# Patient Record
Sex: MALE | Race: WHITE | NOT HISPANIC OR LATINO | Employment: FULL TIME | URBAN - METROPOLITAN AREA
[De-identification: names, ages, dates, MRNs, and addresses within clinical notes are randomized per-mention and may not be internally consistent; named-entity substitution may affect disease eponyms.]

---

## 2017-02-01 DIAGNOSIS — F33.0 MAJOR DEPRESSIVE DISORDER, RECURRENT EPISODE, MILD (H): Primary | ICD-10-CM

## 2017-02-01 NOTE — TELEPHONE ENCOUNTER
Routing refill request to provider for review/approval because:  Juju given x1 and patient did not follow up, please advise  Patient needs to be seen because it has been more than 1 year since last office visit.

## 2017-02-01 NOTE — TELEPHONE ENCOUNTER
buPROPion        Last Written Prescription Date: 12/21/16  Last Fill Quantity: 30; # refills: 0  Last Office Visit with [facility], [facility] or  Health prescribing provider:  12/21/16        Last PHQ-9 score on record=   PHQ-9 SCORE 10/6/2015   Total Score -   Total Score 1       AST       36   7/3/2014  ALT       20   6/29/2016

## 2017-02-02 RX ORDER — BUPROPION HYDROCHLORIDE 150 MG/1
150 TABLET ORAL EVERY MORNING
Qty: 30 TABLET | Refills: 0 | Status: SHIPPED | OUTPATIENT
Start: 2017-02-02 | End: 2017-03-03

## 2017-02-09 NOTE — TELEPHONE ENCOUNTER
Left detailed msg stating that a 30 day was sent to pharm, appt will be required for further refills.  Talya Price

## 2017-02-21 DIAGNOSIS — F33.0 MAJOR DEPRESSIVE DISORDER, RECURRENT EPISODE, MILD (H): ICD-10-CM

## 2017-02-21 RX ORDER — CITALOPRAM HYDROBROMIDE 20 MG/1
20 TABLET ORAL DAILY
Qty: 90 TABLET | Refills: 0 | Status: SHIPPED | OUTPATIENT
Start: 2017-02-21 | End: 2017-03-03

## 2017-02-21 NOTE — TELEPHONE ENCOUNTER
Prescription approved per [facility] Refill Protocol.  Has upcoming appt.

## 2017-02-21 NOTE — TELEPHONE ENCOUNTER
citalopram      Last Written Prescription Date: 10/06/15  Last Fill Quantity: 90, # refills: 3  Last Office Visit with [facility] primary care provider:  06/30/16   Next 5 appointments (look out 90 days)     Feb 24, 2017 10:40 AM CST   [facility]    [address] [place] [address] [phone]                   Last PHQ-9 score on record=   PHQ-9 SCORE 10/6/2015   Total Score -   Total Score 1

## 2017-03-03 ENCOUNTER — OFFICE VISIT (OUTPATIENT)
Dept: INTERNAL MEDICINE | Facility: CLINIC | Age: 57
End: 2017-03-03
Payer: COMMERCIAL

## 2017-03-03 VITALS
OXYGEN SATURATION: 98 % | HEART RATE: 76 BPM | TEMPERATURE: 98.2 F | HEIGHT: 68 IN | SYSTOLIC BLOOD PRESSURE: 112 MMHG | BODY MASS INDEX: 27.9 KG/M2 | DIASTOLIC BLOOD PRESSURE: 70 MMHG | WEIGHT: 184.1 LBS

## 2017-03-03 DIAGNOSIS — Z23 NEED FOR VACCINATION: ICD-10-CM

## 2017-03-03 DIAGNOSIS — I10 ESSENTIAL HYPERTENSION, BENIGN: ICD-10-CM

## 2017-03-03 DIAGNOSIS — F33.0 MAJOR DEPRESSIVE DISORDER, RECURRENT EPISODE, MILD (H): Primary | ICD-10-CM

## 2017-03-03 DIAGNOSIS — I25.10 CORONARY ARTERY DISEASE INVOLVING NATIVE CORONARY ARTERY OF NATIVE HEART WITHOUT ANGINA PECTORIS: ICD-10-CM

## 2017-03-03 PROCEDURE — 99214 OFFICE O/P EST MOD 30 MIN: CPT | Mod: 25 | Performed by: INTERNAL MEDICINE

## 2017-03-03 PROCEDURE — 90715 TDAP VACCINE 7 YRS/> IM: CPT | Performed by: INTERNAL MEDICINE

## 2017-03-03 PROCEDURE — 90471 IMMUNIZATION ADMIN: CPT | Performed by: INTERNAL MEDICINE

## 2017-03-03 RX ORDER — BUPROPION HYDROCHLORIDE 150 MG/1
150 TABLET ORAL EVERY MORNING
Qty: 90 TABLET | Refills: 3 | Status: SHIPPED | OUTPATIENT
Start: 2017-03-03 | End: 2018-03-17

## 2017-03-03 RX ORDER — CITALOPRAM HYDROBROMIDE 20 MG/1
20 TABLET ORAL DAILY
Qty: 90 TABLET | Refills: 3 | Status: SHIPPED | OUTPATIENT
Start: 2017-03-03 | End: 2018-05-19

## 2017-03-03 NOTE — PATIENT INSTRUCTIONS
Try the following for post nasal drainage:    -flonase  -nasacort    These need to be taken daily in order to work

## 2017-03-03 NOTE — NURSING NOTE
"Chief Complaint   Patient presents with     Hypertension     med recheck/ f/u     Lipids     med recheck/ f/u     Depression     med recheck/ f/u       Initial /70  Pulse 76  Temp 98.2  F (36.8  C) (Oral)  Ht 5' 8\" (1.727 m)  Wt 184 lb 1.6 oz (83.5 kg)  SpO2 98%  BMI 27.99 kg/m2 Estimated body mass index is 27.99 kg/(m^2) as calculated from the following:    Height as of this encounter: 5' 8\" (1.727 m).    Weight as of this encounter: 184 lb 1.6 oz (83.5 kg).  Medication Reconciliation: complete   Ludivina Alcaraz CMA    Screening Questionnaire for Adult Immunization    Are you sick today?   No   Do you have allergies to medications, food, a vaccine component or latex?   Yes   Have you ever had a serious reaction after receiving a vaccination?   No   Do you have a long-term health problem with heart disease, lung disease, asthma, kidney disease, metabolic disease (e.g. diabetes), anemia, or other blood disorder?   Yes   Do you have cancer, leukemia, HIV/AIDS, or any other immune system problem?   No   In the past 3 months, have you taken medications that affect  your immune system, such as prednisone, other steroids, or anticancer drugs; drugs for the treatment of rheumatoid arthritis, Crohn s disease, or psoriasis; or have you had radiation treatments?   No   Have you had a seizure, or a brain or other nervous system problem?   No   During the past year, have you received a transfusion of blood or blood     products, or been given immune (gamma) globulin or antiviral drug?   No   For women: Are you pregnant or is there a chance you could become        pregnant during the next month?   No   Have you received any vaccinations in the past 4 weeks?   No     Immunization questionnaire was positive for at least one answer.  Notified antonio.      MNVFC doesn't apply on this patient    Per orders of Dr. alvarez, injection of tdap given by Ludivina Alcaraz. Patient instructed to remain in clinic for 20 minutes " afterwards, and to report any adverse reaction to me immediately.       Screening performed by Ludivina Alcaraz on 3/3/2017 at 9:26 AM.

## 2017-03-03 NOTE — PROGRESS NOTES
"  SUBJECTIVE:                                                    Hermelindo Duffy is a 56 year old male who presents to clinic today for the following health issues:    Hyperlipidemia Follow-Up      Rate your low fat/cholesterol diet?: not monitoring fat    Taking statin?  Yes, no muscle aches from statin    Other lipid medications/supplements?:  none     Hypertension Follow-up      Outpatient blood pressures are not being checked.    Low Salt Diet: not monitoring salt     Depression Followup    Status since last visit: he stopped taking medication and sx were wore, but he started on medication again and saw psych twice and he says he is feeling much better    See PHQ-9 for current symptoms.  Other associated symptoms: None    Complicating factors:   Significant life event:  Yes-  Raising money for [employer] construction project   Current substance abuse:  None  Anxiety or Panic symptoms:  No    PHQ-9  English PHQ-9   Any Language          Amount of exercise or physical activity: None    Problems taking medications regularly: No    Medication side effects: possible cough from lisinopril - wondering if there are any alternatives  Diet: regular (no restrictions)    Problem list and histories reviewed & adjusted, as indicated.  Additional history: as documented    Labs reviewed in EPIC    Reviewed and updated as needed this visit by clinical staff  Tobacco  Allergies  Meds  Problems       Reviewed and updated as needed this visit by Provider  Allergies  Meds  Problems         ROS:  Constitutional, HEENT, cardiovascular, pulmonary, gi and gu systems are negative, except as otherwise noted.  ENT/MOUTH: ear fullness    OBJECTIVE:                                                    /70  Pulse 76  Temp 98.2  F (36.8  C) (Oral)  Ht 5' 8\" (1.727 m)  Wt 184 lb 1.6 oz (83.5 kg)  SpO2 98%  BMI 27.99 kg/m2  Body mass index is 27.99 kg/(m^2).  GENERAL APPEARANCE: healthy, alert and no distress  HENT: ear canals and TM's " normal and nose and mouth without ulcers or lesions. Minimal cerumen present  NECK: no adenopathy, no asymmetry, masses, or scars and thyroid normal to palpation  RESP: lungs clear to auscultation - no rales, rhonchi or wheezes  CV: regular rates and rhythm, normal S1 S2, no S3 or S4 and no murmur, click or rub  MS: extremities normal- no gross deformities noted  SKIN: no suspicious lesions or rashes  PSYCH: mentation appears normal and affect normal/bright    Diagnostic test results:  none      ASSESSMENT/PLAN:                                                    1. Major depressive disorder, recurrent episode, mild (H)  Well controlled  - citalopram (CELEXA) 20 MG tablet; Take 1 tablet (20 mg) by mouth daily  Dispense: 90 tablet; Refill: 3  - buPROPion (WELLBUTRIN XL) 150 MG 24 hr tablet; Take 1 tablet (150 mg) by mouth every morning  Dispense: 90 tablet; Refill: 3    2. Need for vaccination  - TDAP (ADACEL AGES 11-64)    3. Coronary artery disease involving native coronary artery of native heart without angina pectoris  Stable. No symptoms  On statin , asa    4. Essential hypertension, benign (HTN)  Well controlled  - Basic metabolic panel; Future      Follow up with Provider - 6 mo      Tha Ortega MD  [facility]

## 2017-03-03 NOTE — MR AVS SNAPSHOT
After Visit Summary   3/3/2017    Hermelindo Duffy    MRN: [id number]           Patient Information     Date Of Birth          1960        Visit Information        Provider Department      3/3/2017 9:20 AM Tha Ortega MD [facility]        Today's Diagnoses     Major depressive disorder, recurrent episode, mild (H)    -  1    Need for vaccination        Coronary artery disease involving native coronary artery of native heart without angina pectoris        Essential hypertension, benign (HTN)          Care Instructions    Try the following for post nasal drainage:    -flonase  -nasacort    These need to be taken daily in order to work    Also could try these on a as needed   Can try one of the following over the counter antihistamines:  Loratadine (Claritin)  Fexofenadine (allegra)  Cetirizine (zyrtec)              Follow-ups after your visit        Future tests that were ordered for you today     Open Future Orders        Priority Expected Expires Ordered    Basic metabolic panel Routine 7/3/2017 3/3/2018 3/3/2017            Who to contact     If you have questions or need follow up information about today's clinic visit or your schedule please contact [facility] directly at [phone].  Normal or non-critical lab and imaging results will be communicated to you by MyChart, letter or phone within 4 business days after the clinic has received the results. If you do not hear from us within 7 days, please contact the clinic through [employer] or phone. If you have a critical or abnormal lab result, we will notify you by phone as soon as possible.  Submit refill requests through [employer] or call your pharmacy and they will forward the refill request to us. Please allow 3 business days for your refill to be completed.          Additional Information About Your Visit        Sam[employer] Information     [employer] lets you send messages to your doctor, view  "your test results, renew your prescriptions, schedule appointments and more. To sign up, go to [email or web address] . Click on \"Log in\" on the left side of the screen, which will take you to the Welcome page. Then click on \"Sign up Now\" on the right side of the page.     You will be asked to enter the access code listed below, as well as some personal information. Please follow the directions to create your username and password.     Your access code is: [email or web address]  Expires: 2017 10:00 AM     Your access code will  in 90 days. If you need help or a new code, please call your [place] clinic or [phone].        Care EveryWhere ID     This is your Care EveryWhere ID. This could be used by other organizations to access your [place] medical records  [id number]        Your Vitals Were     Pulse Temperature Height Pulse Oximetry BMI (Body Mass Index)       76 98.2  F (36.8  C) (Oral) 5' 8\" (1.727 m) 98% 27.99 kg/m2        Blood Pressure from Last 3 Encounters:   17 112/70   16 108/68   16 128/82    Weight from Last 3 Encounters:   17 184 lb 1.6 oz (83.5 kg)   16 190 lb (86.2 kg)   16 193 lb 3.2 oz (87.6 kg)              We Performed the Following     TDAP (ADACEL AGES 11-64)          Where to get your medicines      These medications were sent to [facility] - [place], [place] - [address]  [address], [place] [address]     Phone:  [phone]     buPROPion 150 MG 24 hr tablet    citalopram 20 MG tablet          Primary Care Provider Office Phone # Fax #    Tha Ortega [phone]       [facility] [address] [facility] [address]        Thank you!     Thank you for choosing [facility]  for your care. Our goal is always to provide you with excellent care. Hearing back from our patients is one way we can continue to improve our services. Please take a few minutes to complete " the written survey that you may receive in the mail after your visit with us. Thank you!             Your Updated Medication List - Protect others around you: Learn how to safely use, store and throw away your medicines at www.disposemymeds.org.          This list is accurate as of: 3/3/17 10:00 AM.  Always use your most recent med list.                   Brand Name Dispense Instructions for use    aspirin 81 MG tablet      Take  by mouth daily.       buPROPion 150 MG 24 hr tablet    WELLBUTRIN XL    90 tablet    Take 1 tablet (150 mg) by mouth every morning       citalopram 20 MG tablet    celeXA    90 tablet    Take 1 tablet (20 mg) by mouth daily       ezetimibe 10 MG tablet    ZETIA    90 tablet    Take 1 tablet (10 mg) by mouth daily       lisinopril 5 MG tablet    PRINIVIL/ZESTRIL    90 tablet    Take 1 tablet (5 mg) by mouth daily       rosuvastatin 20 MG tablet    CRESTOR    90 tablet    Take 1 tablet (20 mg) by mouth daily

## 2017-05-20 DIAGNOSIS — F33.0 MAJOR DEPRESSIVE DISORDER, RECURRENT EPISODE, MILD (H): ICD-10-CM

## 2017-05-22 RX ORDER — CITALOPRAM HYDROBROMIDE 20 MG/1
TABLET ORAL
Qty: 90 TABLET | Refills: 0 | OUTPATIENT
Start: 2017-05-22

## 2017-07-19 DIAGNOSIS — I25.10 CORONARY ARTERY DISEASE INVOLVING NATIVE CORONARY ARTERY OF NATIVE HEART WITHOUT ANGINA PECTORIS: ICD-10-CM

## 2017-07-19 DIAGNOSIS — E78.5 HYPERLIPIDEMIA LDL GOAL <70: ICD-10-CM

## 2017-07-19 RX ORDER — EZETIMIBE 10 MG/1
10 TABLET ORAL DAILY
Qty: 90 TABLET | Refills: 0 | Status: SHIPPED | OUTPATIENT
Start: 2017-07-19 | End: 2017-10-23

## 2017-07-28 ENCOUNTER — OFFICE VISIT (OUTPATIENT)
Dept: CARDIOLOGY | Facility: CLINIC | Age: 57
End: 2017-07-28
Attending: INTERNAL MEDICINE
Payer: COMMERCIAL

## 2017-07-28 VITALS
DIASTOLIC BLOOD PRESSURE: 70 MMHG | SYSTOLIC BLOOD PRESSURE: 102 MMHG | BODY MASS INDEX: 28.04 KG/M2 | WEIGHT: 185 LBS | HEART RATE: 77 BPM | HEIGHT: 68 IN

## 2017-07-28 DIAGNOSIS — E78.5 HYPERLIPIDEMIA LDL GOAL <70: ICD-10-CM

## 2017-07-28 DIAGNOSIS — I10 ESSENTIAL HYPERTENSION, BENIGN: ICD-10-CM

## 2017-07-28 DIAGNOSIS — I25.10 CORONARY ARTERY DISEASE INVOLVING NATIVE CORONARY ARTERY OF NATIVE HEART WITHOUT ANGINA PECTORIS: Primary | ICD-10-CM

## 2017-07-28 LAB
ALT SERPL W P-5'-P-CCNC: 18 U/L (ref 5–30)
CHOLEST SERPL-MCNC: 164 MG/DL
HDLC SERPL-MCNC: 45 MG/DL
LDLC SERPL CALC-MCNC: 76 MG/DL
NONHDLC SERPL-MCNC: 119 MG/DL
TRIGL SERPL-MCNC: 217 MG/DL

## 2017-07-28 PROCEDURE — 36415 COLL VENOUS BLD VENIPUNCTURE: CPT | Performed by: INTERNAL MEDICINE

## 2017-07-28 PROCEDURE — 80061 LIPID PANEL: CPT | Performed by: INTERNAL MEDICINE

## 2017-07-28 PROCEDURE — 99214 OFFICE O/P EST MOD 30 MIN: CPT | Performed by: INTERNAL MEDICINE

## 2017-07-28 PROCEDURE — 84460 ALANINE AMINO (ALT) (SGPT): CPT | Performed by: INTERNAL MEDICINE

## 2017-07-28 NOTE — PROGRESS NOTES
HPI and Plan:   See dictation    Orders Placed This Encounter   Procedures     Lipid Profile     ALT     Follow-Up with Cardiologist       No orders of the defined types were placed in this encounter.      There are no discontinued medications.      Encounter Diagnoses   Name Primary?     Coronary artery disease involving native coronary artery of native heart without angina pectoris Yes     Hyperlipidemia LDL goal <70      Essential hypertension, benign (HTN)        CURRENT MEDICATIONS:  Current Outpatient Prescriptions   Medication Sig Dispense Refill     ezetimibe (ZETIA) 10 MG tablet Take 1 tablet (10 mg) by mouth daily 90 tablet 0     citalopram (CELEXA) 20 MG tablet Take 1 tablet (20 mg) by mouth daily 90 tablet 3     buPROPion (WELLBUTRIN XL) 150 MG 24 hr tablet Take 1 tablet (150 mg) by mouth every morning 90 tablet 3     rosuvastatin (CRESTOR) 20 MG tablet Take 1 tablet (20 mg) by mouth daily 90 tablet 3     lisinopril (PRINIVIL,ZESTRIL) 5 MG tablet Take 1 tablet (5 mg) by mouth daily 90 tablet 3     aspirin 81 MG tablet Take  by mouth daily.         ALLERGIES     Allergies   Allergen Reactions     Atorvastatin      Muscle aches     Questran [Cholestyramine]      Nausea       PAST MEDICAL HISTORY:  Past Medical History:   Diagnosis Date     Acute duodenal ulcer with bleeding 1980s     Coronary artery disease      Eczema      Hyperlipidemia LDL goal < 70     intolerant to high dose statins     Hypertension      Mild major depression (H)        PAST SURGICAL HISTORY:  Past Surgical History:   Procedure Laterality Date     ANGIOPLASTY  08/2010    LAD PTCA/JIA     HERNIORRHAPHY INGUINAL      right, x2       FAMILY HISTORY:  Family History   Problem Relation Age of Onset     HEART DISEASE Father      MI approx age 63     CEREBROVASCULAR DISEASE Paternal Uncle      DIABETES No family hx of      Hypertension No family hx of        SOCIAL HISTORY:  Social History     Social History     Marital status:       "Spouse name: N/A     Number of children: N/A     Years of education: N/A     Occupational History     [employer]       [employer]      Social History Main Topics     Smoking status: Never Smoker     Smokeless tobacco: Never Used     Alcohol use Yes      Comment: 2 glasses of wine most nights     Drug use: No     Sexual activity: Yes     Partners: Female     Other Topics Concern     Caffeine Concern No     Sleep Concern Yes     snoring     Weight Concern No     Special Diet No     Exercise Yes     Active     Seat Belt Yes     Parent/Sibling W/ Cabg, Mi Or Angioplasty Before 65f 55m? No     Social History Narrative       Review of Systems:  Skin:  Negative       Eyes:  Positive for glasses (reading)    ENT:  Positive for (throat congested)      Respiratory:  Negative       Cardiovascular:  Negative      Gastroenterology: Positive for heartburn;reflux    Genitourinary:  Negative      Musculoskeletal:  Negative      Neurologic:  Negative      Psychiatric:  Positive for depression    Heme/Lymph/Imm:  Positive for allergies rx allergies  Endocrine:  Negative        Physical Exam:  Vitals: /70  Pulse 77  Ht 1.727 m (5' 8\")  Wt 83.9 kg (185 lb)  BMI 28.13 kg/m2    Constitutional:  cooperative, alert and oriented, well developed, well nourished, in no acute distress        Skin:  warm and dry to the touch, no apparent skin lesions or masses noted        Head:  normocephalic, no masses or lesions        Eyes:  pupils equal and round, conjunctivae and lids unremarkable, sclera white, no xanthalasma, EOMS intact, no nystagmus        ENT:  no pallor or cyanosis, dentition good        Neck:  carotid pulses are full and equal bilaterally, JVP normal, no carotid bruit, no thyromegaly        Chest:  normal breath sounds, clear to auscultation, normal A-P diameter, normal symmetry, normal respiratory excursion, no use of accessory muscles          Cardiac: regular rhythm, normal S1/S2, no S3 or S4, " apical impulse not displaced, no murmurs, gallops or rubs                  Abdomen:           Vascular: pulses full and equal, no bruits auscultated                                        Extremities and Back:  no deformities, clubbing, cyanosis, erythema observed;no edema              Neurological:  affect appropriate, oriented to time, person and place;no gross motor deficits              CC  Sumit Carranza MD   [facility]  [address]  [place] [address]

## 2017-07-28 NOTE — MR AVS SNAPSHOT
"              After Visit Summary   7/28/2017    Hermelindo Duffy    MRN: [id number]           Patient Information     Date Of Birth          1960        Visit Information        Provider Department      7/28/2017 8:45 AM Sumit Carranza MD [facility]        Today's Diagnoses     Coronary artery disease involving native coronary artery of native heart without angina pectoris    -  1    Hyperlipidemia LDL goal <70        Essential hypertension, benign (HTN)           Follow-ups after your visit        Additional Services     Follow-Up with Cardiologist                 Future tests that were ordered for you today     Open Future Orders        Priority Expected Expires Ordered    Lipid Profile Routine 7/28/2018 9/1/2018 7/28/2017    ALT Routine 7/28/2018 9/1/2018 7/28/2017    Follow-Up with Cardiologist Routine 7/28/2018 12/10/2018 7/28/2017            Who to contact     If you have questions or need follow up information about today's clinic visit or your schedule please contact [facility] directly at [phone].  Normal or non-critical lab and imaging results will be communicated to you by [email or web address], letter or phone within 4 business days after the clinic has received the results. If you do not hear from us within 7 days, please contact the clinic through [email or web address] or phone. If you have a critical or abnormal lab result, we will notify you by phone as soon as possible.  Submit refill requests through [employer] or call your pharmacy and they will forward the refill request to us. Please allow 3 business days for your refill to be completed.          Additional Information About Your Visit        [email or web address] Information     [employer] lets you send messages to your doctor, view your test results, renew your prescriptions, schedule appointments and more. To sign up, go to [email or web address] . Click on \"Log in\" on the left side of the " "screen, which will take you to the Welcome page. Then click on \"Sign up Now\" on the right side of the page.     You will be asked to enter the access code listed below, as well as some personal information. Please follow the directions to create your username and password.     Your access code is: [email or web address]  Expires: 10/26/2017  9:39 AM     Your access code will  in 90 days. If you need help or a new code, please call your [place] clinic or [phone].        Care EveryWhere ID     This is your Care EveryWhere ID. This could be used by other organizations to access your [place] medical records  [id number]        Your Vitals Were     Pulse Height BMI (Body Mass Index)             77 1.727 m (5' 8\") 28.13 kg/m2          Blood Pressure from Last 3 Encounters:   17 102/70   17 112/70   16 108/68    Weight from Last 3 Encounters:   17 83.9 kg (185 lb)   17 83.5 kg (184 lb 1.6 oz)   16 86.2 kg (190 lb)              We Performed the Following     Follow-Up with Cardiologist        Primary Care Provider Office Phone # Fax #    Tha Ortega [phone]       [facility] [address] [facility] [address]        Equal Access to Services     PATRICIA BENTON : Hadii aad ku hadasho Soflorinali, waaxda luqadaha, qaybta kaalmada adeegyada, ravi austin . So [facility] [phone].    ATENCIÓN: Si habla español, tiene a brooks disposición servicios gratuitos de asistencia lingüística. Llame al [phone].    We comply with applicable federal civil rights laws and [place] laws. We do not discriminate on the basis of race, color, national origin, age, disability sex, sexual orientation or gender identity.            Thank you!     Thank you for choosing [facility] [place]  for your care. Our goal is always to provide you with excellent care. Hearing back from our patients is one way we can " continue to improve our services. Please take a few minutes to complete the written survey that you may receive in the mail after your visit with us. Thank you!             Your Updated Medication List - Protect others around you: Learn how to safely use, store and throw away your medicines at www.disposemymeds.org.          This list is accurate as of: 7/28/17  9:39 AM.  Always use your most recent med list.                   Brand Name Dispense Instructions for use Diagnosis    aspirin 81 MG tablet      Take  by mouth daily.        buPROPion 150 MG 24 hr tablet    WELLBUTRIN XL    90 tablet    Take 1 tablet (150 mg) by mouth every morning    Major depressive disorder, recurrent episode, mild (H)       citalopram 20 MG tablet    celeXA    90 tablet    Take 1 tablet (20 mg) by mouth daily    Major depressive disorder, recurrent episode, mild (H)       ezetimibe 10 MG tablet    ZETIA    90 tablet    Take 1 tablet (10 mg) by mouth daily    Coronary artery disease involving native coronary artery of native heart without angina pectoris, Hyperlipidemia LDL goal <70       lisinopril 5 MG tablet    PRINIVIL/ZESTRIL    90 tablet    Take 1 tablet (5 mg) by mouth daily    Essential hypertension, benign       rosuvastatin 20 MG tablet    CRESTOR    90 tablet    Take 1 tablet (20 mg) by mouth daily    Hypercholesterolemia

## 2017-07-28 NOTE — LETTER
7/28/2017    Tha Ortega MD  [address] [facility] [address]    RE: Hermelindo Duffy       Dear Colleague,    I had the pleasure of seeing Hermelindo Duffy in the [facility].    HPI and Plan:   See dictation    Orders Placed This Encounter   Procedures     Lipid Profile     ALT     Follow-Up with Cardiologist       No orders of the defined types were placed in this encounter.      There are no discontinued medications.      Encounter Diagnoses   Name Primary?     Coronary artery disease involving native coronary artery of native heart without angina pectoris Yes     Hyperlipidemia LDL goal <70      Essential hypertension, benign (HTN)        CURRENT MEDICATIONS:  Current Outpatient Prescriptions   Medication Sig Dispense Refill     ezetimibe (ZETIA) 10 MG tablet Take 1 tablet (10 mg) by mouth daily 90 tablet 0     citalopram (CELEXA) 20 MG tablet Take 1 tablet (20 mg) by mouth daily 90 tablet 3     buPROPion (WELLBUTRIN XL) 150 MG 24 hr tablet Take 1 tablet (150 mg) by mouth every morning 90 tablet 3     rosuvastatin (CRESTOR) 20 MG tablet Take 1 tablet (20 mg) by mouth daily 90 tablet 3     lisinopril (PRINIVIL,ZESTRIL) 5 MG tablet Take 1 tablet (5 mg) by mouth daily 90 tablet 3     aspirin 81 MG tablet Take  by mouth daily.         ALLERGIES     Allergies   Allergen Reactions     Atorvastatin      Muscle aches     Questran [Cholestyramine]      Nausea       PAST MEDICAL HISTORY:  Past Medical History:   Diagnosis Date     Acute duodenal ulcer with bleeding 1980s     Coronary artery disease      Eczema      Hyperlipidemia LDL goal < 70     intolerant to high dose statins     Hypertension      Mild major depression (H)        PAST SURGICAL HISTORY:  Past Surgical History:   Procedure Laterality Date     ANGIOPLASTY  08/2010    LAD PTCA/JIA     HERNIORRHAPHY INGUINAL      right, x2       FAMILY HISTORY:  Family History   Problem Relation Age of Onset     HEART DISEASE  "Father      MI approx age 63     CEREBROVASCULAR DISEASE Paternal Uncle      DIABETES No family hx of      Hypertension No family hx of        SOCIAL HISTORY:  Social History     Social History     Marital status:      Spouse name: N/A     Number of children: N/A     Years of education: N/A     Occupational History     [employer]       [employer]      Social History Main Topics     Smoking status: Never Smoker     Smokeless tobacco: Never Used     Alcohol use Yes      Comment: 2 glasses of wine most nights     Drug use: No     Sexual activity: Yes     Partners: Female     Other Topics Concern     Caffeine Concern No     Sleep Concern Yes     snoring     Weight Concern No     Special Diet No     Exercise Yes     Active     Seat Belt Yes     Parent/Sibling W/ Cabg, Mi Or Angioplasty Before 65f 55m? No     Social History Narrative       Review of Systems:  Skin:  Negative       Eyes:  Positive for glasses (reading)    ENT:  Positive for (throat congested)      Respiratory:  Negative       Cardiovascular:  Negative      Gastroenterology: Positive for heartburn;reflux    Genitourinary:  Negative      Musculoskeletal:  Negative      Neurologic:  Negative      Psychiatric:  Positive for depression    Heme/Lymph/Imm:  Positive for allergies rx allergies  Endocrine:  Negative        Physical Exam:  Vitals: /70  Pulse 77  Ht 1.727 m (5' 8\")  Wt 83.9 kg (185 lb)  BMI 28.13 kg/m2    Constitutional:  cooperative, alert and oriented, well developed, well nourished, in no acute distress        Skin:  warm and dry to the touch, no apparent skin lesions or masses noted        Head:  normocephalic, no masses or lesions        Eyes:  pupils equal and round, conjunctivae and lids unremarkable, sclera white, no xanthalasma, EOMS intact, no nystagmus        ENT:  no pallor or cyanosis, dentition good        Neck:  carotid pulses are full and equal bilaterally, JVP normal, no carotid bruit, no " thyromegaly        Chest:  normal breath sounds, clear to auscultation, normal A-P diameter, normal symmetry, normal respiratory excursion, no use of accessory muscles          Cardiac: regular rhythm, normal S1/S2, no S3 or S4, apical impulse not displaced, no murmurs, gallops or rubs                  Abdomen:           Vascular: pulses full and equal, no bruits auscultated                                        Extremities and Back:  no deformities, clubbing, cyanosis, erythema observed;no edema              Neurological:  affect appropriate, oriented to time, person and place;no gross motor deficits              CC  Sumit Carranza MD   [facility]  [address]  [place] [address]                  ___CC:  Tha Ortega MD    Dear Brent:    I had the opportunity to see the patient in Cardiology Clinic today at [facility] in [place] for re-evaluation of coronary artery disease.  The patient presented in 2010 with typical angina symptoms and was found to have severe mid LAD disease, treated with angioplasty and stenting.  He had only minor coronary artery disease in his other arteries and did not require any additional revascularization.  He has done well since then without any chest pain symptoms.  He did have some difficulty tolerating statin drugs.  He was intolerant of Lipitor and higher doses of Crestor but seems to be doing okay with Crestor at the 20 mg dose.  He also takes Zetia for additional cholesterol lowering and his numbers look better today than they have in the past.  His LDL is down to 76, HDL 45 and triglycerides 217.  His liver function tests remain normal.  He is not experiencing any concerning cardiac symptoms at this time.  He has no chest pain, shortness of breath, palpitations, lightheadedness or syncope.  He stopped eating fast food and has lost some weight.  He is very active but does not have a regular exercise program.  I encouraged him to do  that.    PHYSICAL EXAMINATION:  Today, his blood pressure is 102/70, heart rate 77 and weight 185 pounds.  His lungs are clear.  His heart rhythm is regular.  He has no cardiac murmurs.    IMPRESSIONS:  The patient is a 56-year-old gentleman with a history of coronary artery disease and stenting of his LAD in 2010.  His primary risk factor is hypercholesterolemia and he was intolerant to some cholesterol medications that we had used in the past.  Fortunately, he is tolerating his current dose of Crestor and Zetia and his cholesterol numbers are satisfactory.  I will have him continue that program for now.  I will plan to see him back again in 1 year for re-evaluation.          Sumit Carranza MD, [facility]    D:  07/28/2017 11:08 T:  07/28/2017 11:41  Document:  [id number] EE\SK      ___CC:  Tha Ortega MD    Dear Brent:    I had the opportunity to see the patient in Cardiology Clinic today at [facility] in [place] for re-evaluation of coronary artery disease.  The patient presented in 2010 with typical angina symptoms and was found to have severe mid LAD disease, treated with angioplasty and stenting.  He had only minor coronary artery disease in his other arteries and did not require any additional revascularization.  He has done well since then without any chest pain symptoms.  He did have some difficulty tolerating statin drugs.  He was intolerant of Lipitor and higher doses of Crestor but seems to be doing okay with Crestor at the 20 mg dose.  He also takes Zetia for additional cholesterol lowering and his numbers look better today than they have in the past.  His LDL is down to 76, HDL 45 and triglycerides 217.  His liver function tests remain normal.  He is not experiencing any concerning cardiac symptoms at this time.  He has no chest pain, shortness of breath, palpitations, lightheadedness or syncope.  He stopped eating fast food and has lost some weight.  He is very active but does not have a  regular exercise program.  I encouraged him to do that.    PHYSICAL EXAMINATION:  Today, his blood pressure is 102/70, heart rate 77 and weight 185 pounds.  His lungs are clear.  His heart rhythm is regular.  He has no cardiac murmurs.    IMPRESSIONS:  The patient is a 56-year-old gentleman with a history of coronary artery disease and stenting of his LAD in 2010.  His primary risk factor is hypercholesterolemia and he was intolerant to some cholesterol medications that we had used in the past.  Fortunately, he is tolerating his current dose of Crestor and Zetia and his cholesterol numbers are satisfactory.  I will have him continue that program for now.  I will plan to see him back again in 1 year for re-evaluation.          Sumit Carranza MD, [facility]    D:  07/28/2017 11:08 T:  07/28/2017 11:41  Document:  [id number] EE\SK    Thank you for allowing me to participate in the care of your patient.    Sincerely,     SUMIT CARRANZA MD     [facility]

## 2017-07-29 NOTE — PROGRESS NOTES
___CC:  Tha Ortega MD    Dear Brent:    I had the opportunity to see the patient in Cardiology Clinic today at [facility] in [place] for re-evaluation of coronary artery disease.  The patient presented in 2010 with typical angina symptoms and was found to have severe mid LAD disease, treated with angioplasty and stenting.  He had only minor coronary artery disease in his other arteries and did not require any additional revascularization.  He has done well since then without any chest pain symptoms.  He did have some difficulty tolerating statin drugs.  He was intolerant of Lipitor and higher doses of Crestor but seems to be doing okay with Crestor at the 20 mg dose.  He also takes Zetia for additional cholesterol lowering and his numbers look better today than they have in the past.  His LDL is down to 76, HDL 45 and triglycerides 217.  His liver function tests remain normal.  He is not experiencing any concerning cardiac symptoms at this time.  He has no chest pain, shortness of breath, palpitations, lightheadedness or syncope.  He stopped eating fast food and has lost some weight.  He is very active but does not have a regular exercise program.  I encouraged him to do that.    PHYSICAL EXAMINATION:  Today, his blood pressure is 102/70, heart rate 77 and weight 185 pounds.  His lungs are clear.  His heart rhythm is regular.  He has no cardiac murmurs.    IMPRESSIONS:  The patient is a 56-year-old gentleman with a history of coronary artery disease and stenting of his LAD in 2010.  His primary risk factor is hypercholesterolemia and he was intolerant to some cholesterol medications that we had used in the past.  Fortunately, he is tolerating his current dose of Crestor and Zetia and his cholesterol numbers are satisfactory.  I will have him continue that program for now.  I will plan to see him back again in 1 year for re-evaluation.          Sumit Carranza MD, [facility]    D:  07/28/2017  11:08 T:  07/28/2017 11:41  Document:  [id number] EE\SK

## 2017-08-16 NOTE — PROGRESS NOTES
___CC:  Tha Ortega MD    Dear Brent:    I had the opportunity to see the patient in Cardiology Clinic today at [facility] in [place] for re-evaluation of coronary artery disease.  The patient presented in 2010 with typical angina symptoms and was found to have severe mid LAD disease, treated with angioplasty and stenting.  He had only minor coronary artery disease in his other arteries and did not require any additional revascularization.  He has done well since then without any chest pain symptoms.  He did have some difficulty tolerating statin drugs.  He was intolerant of Lipitor and higher doses of Crestor but seems to be doing okay with Crestor at the 20 mg dose.  He also takes Zetia for additional cholesterol lowering and his numbers look better today than they have in the past.  His LDL is down to 76, HDL 45 and triglycerides 217.  His liver function tests remain normal.  He is not experiencing any concerning cardiac symptoms at this time.  He has no chest pain, shortness of breath, palpitations, lightheadedness or syncope.  He stopped eating fast food and has lost some weight.  He is very active but does not have a regular exercise program.  I encouraged him to do that.    PHYSICAL EXAMINATION:  Today, his blood pressure is 102/70, heart rate 77 and weight 185 pounds.  His lungs are clear.  His heart rhythm is regular.  He has no cardiac murmurs.    IMPRESSIONS:  The patient is a 56-year-old gentleman with a history of coronary artery disease and stenting of his LAD in 2010.  His primary risk factor is hypercholesterolemia and he was intolerant to some cholesterol medications that we had used in the past.  Fortunately, he is tolerating his current dose of Crestor and Zetia and his cholesterol numbers are satisfactory.  I will have him continue that program for now.  I will plan to see him back again in 1 year for re-evaluation.          Sumit Carranza MD, [facility]    D:  07/28/2017  11:08 T:  07/28/2017 11:41  Document:  [id number] EE\SK

## 2017-08-24 DIAGNOSIS — I10 ESSENTIAL HYPERTENSION, BENIGN: ICD-10-CM

## 2017-08-24 RX ORDER — LISINOPRIL 5 MG/1
5 TABLET ORAL DAILY
Qty: 90 TABLET | Refills: 3 | Status: SHIPPED | OUTPATIENT
Start: 2017-08-24 | End: 2018-08-27

## 2017-10-23 DIAGNOSIS — I25.10 CORONARY ARTERY DISEASE INVOLVING NATIVE CORONARY ARTERY OF NATIVE HEART WITHOUT ANGINA PECTORIS: ICD-10-CM

## 2017-10-23 DIAGNOSIS — E78.5 HYPERLIPIDEMIA LDL GOAL <70: ICD-10-CM

## 2017-10-23 RX ORDER — EZETIMIBE 10 MG/1
10 TABLET ORAL DAILY
Qty: 90 TABLET | Refills: 3 | Status: SHIPPED | OUTPATIENT
Start: 2017-10-23 | End: 2018-11-02

## 2017-11-06 ENCOUNTER — OFFICE VISIT (OUTPATIENT)
Dept: INTERNAL MEDICINE | Facility: CLINIC | Age: 57
End: 2017-11-06
Payer: COMMERCIAL

## 2017-11-06 VITALS
HEART RATE: 77 BPM | SYSTOLIC BLOOD PRESSURE: 104 MMHG | BODY MASS INDEX: 28.57 KG/M2 | OXYGEN SATURATION: 98 % | TEMPERATURE: 97.9 F | DIASTOLIC BLOOD PRESSURE: 76 MMHG | WEIGHT: 187.9 LBS

## 2017-11-06 DIAGNOSIS — M54.17 LUMBOSACRAL RADICULOPATHY AT S1: Primary | ICD-10-CM

## 2017-11-06 DIAGNOSIS — Z23 NEED FOR PROPHYLACTIC VACCINATION AND INOCULATION AGAINST INFLUENZA: ICD-10-CM

## 2017-11-06 DIAGNOSIS — R09.82 POST-NASAL DRAINAGE: ICD-10-CM

## 2017-11-06 PROCEDURE — 90686 IIV4 VACC NO PRSV 0.5 ML IM: CPT | Performed by: INTERNAL MEDICINE

## 2017-11-06 PROCEDURE — 99214 OFFICE O/P EST MOD 30 MIN: CPT | Mod: 25 | Performed by: INTERNAL MEDICINE

## 2017-11-06 PROCEDURE — 90471 IMMUNIZATION ADMIN: CPT | Performed by: INTERNAL MEDICINE

## 2017-11-06 RX ORDER — FLUTICASONE PROPIONATE 50 MCG
2 SPRAY, SUSPENSION (ML) NASAL DAILY
COMMUNITY
Start: 2017-11-06 | End: 2017-11-06

## 2017-11-06 RX ORDER — FLUTICASONE PROPIONATE 50 MCG
2 SPRAY, SUSPENSION (ML) NASAL DAILY
Qty: 1 BOTTLE | Refills: 11 | Status: SHIPPED | OUTPATIENT
Start: 2017-11-06

## 2017-11-06 NOTE — PROGRESS NOTES
SUBJECTIVE:   Hermelindo Duffy is a 57 year old male who presents to clinic today for the following health issues:      Patient saw a Chiropractor that is concerned about reflexes on his left foot and a few other things patient brought a card with the information written on it.    He states he went to the chiropractor for some lower back pain taht radiates into his L leg while in the supine location.     Also would like to get a referral to an ENT as he notes a lot of post nasal drainage, needing to clear his throat on numerous occasions.     Problem list and histories reviewed & adjusted, as indicated.  Additional history: as documented    Labs reviewed in EPIC    Reviewed and updated as needed this visit by clinical staff  Allergies       Reviewed and updated as needed this visit by Provider         ROS:  Constitutional, HEENT, cardiovascular, pulmonary, gi and gu systems are negative, except as otherwise noted.      OBJECTIVE:                                                    /76  Pulse 77  Temp 97.9  F (36.6  C) (Oral)  Wt 187 lb 14.4 oz (85.2 kg)  SpO2 98%  BMI 28.57 kg/m2  Body mass index is 28.57 kg/(m^2).  GENERAL APPEARANCE: alert, no distress and over weight  RESP: lungs clear to auscultation - no rales, rhonchi or wheezes  CV: regular rates and rhythm, normal S1 S2, no S3 or S4 and no murmur, click or rub  NEURO: Normal strength and tone, sensory exam grossly normal, normal strength throughout and diminished ankle reflex on L.   Comprehensive back pain exam:  No tenderness. Straight leg test normal bilat.     Diagnostic test results:  none        ASSESSMENT/PLAN:                                                    1. Lumbosacral radiculopathy at S1  Pt prefers to continue chiropractic care- ok with this. Diminished s1 reflex consistent with pinched nerve but with minimal pain or significant sensory or motor sx conservative approach to start is ok.     2. Post-nasal drainage  - fluticasone  (FLONASE) 50 MCG/ACT spray; Spray 2 sprays into both nostrils daily  Dispense: 1 Bottle; Refill: 11    3. Need for prophylactic vaccination and inoculation against influenza  - FLU VAC, SPLIT VIRUS IM > 3 YO (QUADRIVALENT) [18090]  - Vaccine Administration, Initial [90493]    Tha Ortega MD  [facility]  Injectable Influenza Immunization Documentation    1.  Is the person to be vaccinated sick today?   No    2. Does the person to be vaccinated have an allergy to a component   of the vaccine?   No  Egg Allergy Algorithm Link    3. Has the person to be vaccinated ever had a serious reaction   to influenza vaccine in the past?   No    4. Has the person to be vaccinated ever had Guillain-Barré syndrome?   No    Form completed by Kayy Moreno CMA

## 2017-11-06 NOTE — MR AVS SNAPSHOT
"              After Visit Summary   2017    Hermelindo Duffy    MRN: [id number]           Patient Information     Date Of Birth          1960        Visit Information        Provider Department      2017 10:20 AM Tha Ortega MD [facility]        Today's Diagnoses     Lumbosacral radiculopathy at S1    -  1    Post-nasal drainage        Need for prophylactic vaccination and inoculation against influenza           Follow-ups after your visit        Who to contact     If you have questions or need follow up information about today's clinic visit or your schedule please contact [facility] directly at [phone].  Normal or non-critical lab and imaging results will be communicated to you by MyChart, letter or phone within 4 business days after the clinic has received the results. If you do not hear from us within 7 days, please contact the clinic through MyChart or phone. If you have a critical or abnormal lab result, we will notify you by phone as soon as possible.  Submit refill requests through [employer] or call your pharmacy and they will forward the refill request to us. Please allow 3 business days for your refill to be completed.          Additional Information About Your Visit        MyChart Information     [employer] lets you send messages to your doctor, view your test results, renew your prescriptions, schedule appointments and more. To sign up, go to [email or web address] . Click on \"Log in\" on the left side of the screen, which will take you to the Welcome page. Then click on \"Sign up Now\" on the right side of the page.     You will be asked to enter the access code listed below, as well as some personal information. Please follow the directions to create your username and password.     Your access code is: [email or web address]  Expires: 2018 11:11 AM     Your access code will  in 90 days. If you need help or a new code, please call " your [place] clinic or [phone].        Care EveryWhere ID     This is your Care EveryWhere ID. This could be used by other organizations to access your [place] medical records  [id number]        Your Vitals Were     Pulse Temperature Pulse Oximetry BMI (Body Mass Index)          77 97.9  F (36.6  C) (Oral) 98% 28.57 kg/m2         Blood Pressure from Last 3 Encounters:   11/06/17 104/76   07/28/17 102/70   03/03/17 112/70    Weight from Last 3 Encounters:   11/06/17 187 lb 14.4 oz (85.2 kg)   07/28/17 185 lb (83.9 kg)   03/03/17 184 lb 1.6 oz (83.5 kg)              We Performed the Following     FLU VAC, SPLIT VIRUS IM > 3 YO (QUADRIVALENT) [55535]     Vaccine Administration, Initial [01914]          Today's Medication Changes          These changes are accurate as of: 11/6/17 11:11 AM.  If you have any questions, ask your nurse or doctor.               Start taking these medicines.        Dose/Directions    fluticasone 50 MCG/ACT spray   Commonly known as:  FLONASE   Used for:  Post-nasal drainage   Started by:  Tha Ortega MD        Dose:  2 spray   Spray 2 sprays into both nostrils daily   Quantity:  1 Bottle   Refills:  11            Where to get your medicines      These medications were sent to [facility] [address] [place] [facility] [address] [facility] [address]  [address] [facility] [address], [place] [address]     Phone:  [phone]     fluticasone 50 MCG/ACT spray                Primary Care Provider Office Phone # Fax #    Tha Ortega [phone]       [address] [place] [address]        Equal Access to Services     PATRICIA BENTON AH: Farzana Maurice, higinio nixon, nadia ambrosio, ravi suresh. So [facility] [phone].    ATENCIÓN: Si habla español, tiene a brooks disposición servicios gratuitos de asistencia lingüística. Llame al [phone].    We comply with applicable federal civil rights laws and [place] laws. We do not  discriminate on the basis of race, color, national origin, age, disability, sex, sexual orientation, or gender identity.            Thank you!     Thank you for choosing [facility]  for your care. Our goal is always to provide you with excellent care. Hearing back from our patients is one way we can continue to improve our services. Please take a few minutes to complete the written survey that you may receive in the mail after your visit with us. Thank you!             Your Updated Medication List - Protect others around you: Learn how to safely use, store and throw away your medicines at www.disposemymeds.org.          This list is accurate as of: 11/6/17 11:11 AM.  Always use your most recent med list.                   Brand Name Dispense Instructions for use Diagnosis    aspirin 81 MG tablet      Take  by mouth daily.        buPROPion 150 MG 24 hr tablet    WELLBUTRIN XL    90 tablet    Take 1 tablet (150 mg) by mouth every morning    Major depressive disorder, recurrent episode, mild (H)       citalopram 20 MG tablet    celeXA    90 tablet    Take 1 tablet (20 mg) by mouth daily    Major depressive disorder, recurrent episode, mild (H)       ezetimibe 10 MG tablet    ZETIA    90 tablet    Take 1 tablet (10 mg) by mouth daily    Coronary artery disease involving native coronary artery of native heart without angina pectoris, Hyperlipidemia LDL goal <70       fluticasone 50 MCG/ACT spray    FLONASE    1 Bottle    Spray 2 sprays into both nostrils daily    Post-nasal drainage       lisinopril 5 MG tablet    PRINIVIL/ZESTRIL    90 tablet    Take 1 tablet (5 mg) by mouth daily    Essential hypertension, benign       ranitidine 150 MG tablet    ZANTAC     Take 150 mg by mouth        rosuvastatin 20 MG tablet    CRESTOR    90 tablet    Take 1 tablet (20 mg) by mouth daily    Hypercholesterolemia

## 2017-11-06 NOTE — NURSING NOTE
"Chief Complaint   Patient presents with     Refluxes       Initial /76  Pulse 77  Temp 97.9  F (36.6  C) (Oral)  Wt 187 lb 14.4 oz (85.2 kg)  SpO2 98%  BMI 28.57 kg/m2 Estimated body mass index is 28.57 kg/(m^2) as calculated from the following:    Height as of 7/28/17: 5' 8\" (1.727 m).    Weight as of this encounter: 187 lb 14.4 oz (85.2 kg).  Medication Reconciliation: complete    "

## 2017-11-20 DIAGNOSIS — E78.00 HYPERCHOLESTEROLEMIA: ICD-10-CM

## 2017-11-20 RX ORDER — ROSUVASTATIN CALCIUM 20 MG/1
20 TABLET, COATED ORAL DAILY
Qty: 90 TABLET | Refills: 2 | Status: SHIPPED | OUTPATIENT
Start: 2017-11-20 | End: 2018-08-27

## 2018-03-17 DIAGNOSIS — F33.0 MAJOR DEPRESSIVE DISORDER, RECURRENT EPISODE, MILD (H): ICD-10-CM

## 2018-03-18 NOTE — TELEPHONE ENCOUNTER
"Requested Prescriptions   Pending Prescriptions Disp Refills     buPROPion (WELLBUTRIN XL) 150 MG 24 hr tablet [Pharmacy Med Name: BUPROPION HCL  MG TABLET]  Last Written Prescription Date:  03/03/2017  Last Fill Quantity: 90,  # refills: 3   Last office visit: 11/6/2017 with prescribing provider:  CLARA   Future Office Visit:     90 tablet 3     Sig: TAKE 1 TABLET BY MOUTH EVERY MORNING    SSRIs Protocol Failed    3/17/2018  1:35 AM       Failed - PHQ-9 score less than 5 in past 6 months    The PHQ-9 criteria is meant to fail. It requires a PHQ-9 score review  PHQ-9 SCORE 8/7/2015 10/6/2015   Total Score 9 -   Total Score - 1     .         Passed - Medication is Bupropion    If the medication is Bupropion (Wellbutrin), and the patient is taking for smoking cessation; OK to refill.         Passed - Patient is age 18 or older       Passed - Recent (6 mo) or future (30 days) visit within the authorizing provider's specialty    Patient had office visit in the last 6 months or has a visit in the next 30 days with authorizing provider or within the authorizing provider's specialty.  See \"Patient Info\" tab in inbasket, or \"Choose Columns\" in Meds & Orders section of the refill encounter.              "

## 2018-03-23 RX ORDER — BUPROPION HYDROCHLORIDE 150 MG/1
TABLET ORAL
Qty: 90 TABLET | Refills: 0 | Status: SHIPPED | OUTPATIENT
Start: 2018-03-23 | End: 2018-06-24

## 2018-03-24 ASSESSMENT — PATIENT HEALTH QUESTIONNAIRE - PHQ9: SUM OF ALL RESPONSES TO PHQ QUESTIONS 1-9: 0

## 2018-04-25 ENCOUNTER — TELEPHONE (OUTPATIENT)
Dept: NURSING | Facility: CLINIC | Age: 58
End: 2018-04-25

## 2018-04-25 NOTE — TELEPHONE ENCOUNTER
haven't seen it but can occur in 2-4% per rx info  We added bupropion in 2015 so if had pruritus before this than unlikely.

## 2018-04-25 NOTE — TELEPHONE ENCOUNTER
"Patient wondering if Bupropion could be cause of itching.  Has taken medication since 2015.  Has all over body itching \"for years.\"  Denies rash.  \"I've just learned to live with it.\"  Patient was viewing a commercial which listed itching as a side effect, requesting advisement from PCP.    "

## 2018-04-26 NOTE — TELEPHONE ENCOUNTER
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 Please advise or refer.Elsy Flor RN

## 2018-05-19 DIAGNOSIS — F33.0 MAJOR DEPRESSIVE DISORDER, RECURRENT EPISODE, MILD (H): ICD-10-CM

## 2018-05-19 NOTE — TELEPHONE ENCOUNTER
"Requested Prescriptions   Pending Prescriptions Disp Refills     citalopram (CELEXA) 20 MG tablet [Pharmacy Med Name: CITALOPRAM HBR 20 MG TABLET]  Last Written Prescription Date:  3/3/2017  Last Fill Quantity: 90 tablet,  # refills: 3   Last Office Visit: 11/6/2017   Future Office Visit:    90 tablet 3     Sig: TAKE 1 TABLET BY MOUTH DAILY    SSRIs Protocol Failed    5/19/2018  9:55 AM       Failed - Recent (6 mo) or future (30 days) visit within the authorizing provider's specialty    Patient had office visit in the last 6 months or has a visit in the next 30 days with authorizing provider or within the authorizing provider's specialty.  See \"Patient Info\" tab in inbasket, or \"Choose Columns\" in Meds & Orders section of the refill encounter.    PHQ-9 SCORE 8/7/2015 10/6/2015 3/23/2018   Total Score 9 - -   Total Score - 1 0     No flowsheet data found.         Passed - PHQ-9 score less than 5 in past 6 months    Please review last PHQ-9 score.          Passed - Patient is age 18 or older          "

## 2018-05-22 RX ORDER — CITALOPRAM HYDROBROMIDE 20 MG/1
TABLET ORAL
Qty: 90 TABLET | Refills: 1 | Status: SHIPPED | OUTPATIENT
Start: 2018-05-22 | End: 2018-11-27

## 2018-06-24 DIAGNOSIS — F33.0 MAJOR DEPRESSIVE DISORDER, RECURRENT EPISODE, MILD (H): ICD-10-CM

## 2018-06-24 NOTE — TELEPHONE ENCOUNTER
"Requested Prescriptions   Pending Prescriptions Disp Refills     buPROPion (WELLBUTRIN XL) 150 MG 24 hr tablet [Pharmacy Med Name: BUPROPION HCL  MG TABLET]  Last Written Prescription Date:  03/23/2018  Last Fill Quantity: 90,  # refills: 0   Last office visit: 11/6/2017 with prescribing provider:  CLARA   Future Office Visit:     90 tablet 0     Sig: TAKE 1 TABLET BY MOUTH EVERY MORNING    SSRIs Protocol Failed    6/24/2018  3:59 AM       Failed - Recent (6 mo) or future (30 days) visit within the authorizing provider's specialty    Patient had office visit in the last 6 months or has a visit in the next 30 days with authorizing provider or within the authorizing provider's specialty.  See \"Patient Info\" tab in inbasket, or \"Choose Columns\" in Meds & Orders section of the refill encounter.           Passed - PHQ-9 score less than 5 in past 6 months    Please review last PHQ-9 score.   PHQ-9 SCORE 8/7/2015 10/6/2015 3/23/2018   Total Score 9 - -   Total Score - 1 0              Passed - Medication is Bupropion    If the medication is Bupropion (Wellbutrin), and the patient is taking for smoking cessation; OK to refill.         Passed - Patient is age 18 or older          "

## 2018-06-25 RX ORDER — BUPROPION HYDROCHLORIDE 150 MG/1
TABLET ORAL
Qty: 90 TABLET | Refills: 0 | Status: SHIPPED | OUTPATIENT
Start: 2018-06-25 | End: 2018-10-06

## 2018-08-27 DIAGNOSIS — E78.00 HYPERCHOLESTEROLEMIA: ICD-10-CM

## 2018-08-27 DIAGNOSIS — I10 ESSENTIAL HYPERTENSION, BENIGN: ICD-10-CM

## 2018-08-27 RX ORDER — ROSUVASTATIN CALCIUM 20 MG/1
20 TABLET, COATED ORAL DAILY
Qty: 90 TABLET | Refills: 0 | Status: SHIPPED | OUTPATIENT
Start: 2018-08-27 | End: 2018-11-26

## 2018-08-27 RX ORDER — LISINOPRIL 5 MG/1
5 TABLET ORAL DAILY
Qty: 90 TABLET | Refills: 0 | Status: SHIPPED | OUTPATIENT
Start: 2018-08-27 | End: 2018-09-21

## 2018-09-18 ENCOUNTER — CARE COORDINATION (OUTPATIENT)
Dept: CARDIOLOGY | Facility: CLINIC | Age: 58
End: 2018-09-18

## 2018-09-18 DIAGNOSIS — I25.10 CAD (CORONARY ARTERY DISEASE): Primary | ICD-10-CM

## 2018-09-18 NOTE — PROGRESS NOTES
Received message from scheduling that pt would like to have a heart scan done before OV with  next week. I left message for pt stating that  will see pt next week and after discussing any symptoms he may be having and assessing him,  will decide if any further testing needed. Pt to call back if he is having any concerning symptoms such as chest pain or SOB. Katey CROWLEY September 18, 2018, 1:43 PM

## 2018-09-20 NOTE — TELEPHONE ENCOUNTER
Correction: let's stop lisinopril and NOT start losartan. His BP is low enough that he may not need it. EE

## 2018-09-20 NOTE — TELEPHONE ENCOUNTER
OK to set up stress echo prior to my OV with patient to check CAD status. Also, ok to switch lisinopril to losartan to see if it helps the cough. Losartan 50 mg daily. EE

## 2018-09-21 NOTE — PROGRESS NOTES
I called pt and let him know  would like him to stop the lisinopril to see if he notices improvement in his cough, and that he recommended pt have stress echo prior to OV in November. Pt will call us back in a couple weeks with an update on whether he notices improvement in his cough with stopping the lisinopril. I told him if he does not notice improvement he should f/u with PMD to rule out any other causes. Pt transferred to scheduling to set up stress echo. Katey CROWLEY September 21, 2018, 11:40 AM

## 2018-10-06 DIAGNOSIS — F33.0 MAJOR DEPRESSIVE DISORDER, RECURRENT EPISODE, MILD (H): ICD-10-CM

## 2018-10-06 NOTE — LETTER
[facility]  [address] [id number] [phone]            Hermelindo Duffy  [address] [place] [address]        October 9, 2018    Dear Hermelindo,    While refilling your prescription today, we noticed that you are due for an appointment with your provider.  We will refill your prescription for 30 days, but a follow-up appointment must be made before any additional refills can be approved.     Taking care of your health is important to us and we look forward to seeing you in the near future.  Please call us at [phone] or [phone] (or use [employer]) to schedule an appointment.     Please disregard this notice if you have already made an appointment.    Sincerely,        [facility]

## 2018-10-06 NOTE — TELEPHONE ENCOUNTER
"Requested Prescriptions   Pending Prescriptions Disp Refills     buPROPion (WELLBUTRIN XL) 150 MG 24 hr tablet [Pharmacy Med Name: BUPROPION HCL  MG TABLET]  Last Written Prescription Date:  6/25/18  Last Fill Quantity: 90 TABLET,  # refills: 0   Last office visit: 11/6/17 with prescribing provider:  CLARA   Future Office Visit:   Next 5 appointments (look out 90 days)     Nov 07, 2018 10:45 AM CST   Return Visit with Sumit Carranza MD   [facility] ([facility])    [address] [place] [id number] [phone] OPT 2                  90 tablet 0     Sig: TAKE 1 TABLET BY MOUTH EVERY MORNING    SSRIs Protocol Failed    10/6/2018 11:12 AM       Failed - PHQ-9 score less than 5 in past 6 months    Please review last PHQ-9 score.   PHQ-9 SCORE 8/7/2015 10/6/2015 3/23/2018   Total Score 9 - -   Total Score - 1 0     No flowsheet data found.         Failed - Recent (6 mo) or future (30 days) visit within the authorizing provider's specialty    Patient had office visit in the last 6 months or has a visit in the next 30 days with authorizing provider or within the authorizing provider's specialty.  See \"Patient Info\" tab in inbasket, or \"Choose Columns\" in Meds & Orders section of the refill encounter.           Passed - Medication is Bupropion    If the medication is Bupropion (Wellbutrin), and the patient is taking for smoking cessation; OK to refill.         Passed - Patient is age 18 or older          "

## 2018-10-09 RX ORDER — BUPROPION HYDROCHLORIDE 150 MG/1
TABLET ORAL
Qty: 30 TABLET | Refills: 0 | Status: SHIPPED | OUTPATIENT
Start: 2018-10-09 | End: 2018-11-21

## 2018-11-02 DIAGNOSIS — I25.10 CORONARY ARTERY DISEASE INVOLVING NATIVE CORONARY ARTERY OF NATIVE HEART WITHOUT ANGINA PECTORIS: ICD-10-CM

## 2018-11-02 DIAGNOSIS — E78.5 HYPERLIPIDEMIA LDL GOAL <70: ICD-10-CM

## 2018-11-02 RX ORDER — EZETIMIBE 10 MG/1
10 TABLET ORAL DAILY
Qty: 90 TABLET | Refills: 0 | Status: SHIPPED | OUTPATIENT
Start: 2018-11-02 | End: 2019-02-04

## 2018-11-06 ENCOUNTER — HOSPITAL ENCOUNTER (OUTPATIENT)
Dept: CARDIOLOGY | Facility: CLINIC | Age: 58
Discharge: HOME | End: 2018-11-06
Attending: INTERNAL MEDICINE | Admitting: INTERNAL MEDICINE
Payer: COMMERCIAL

## 2018-11-06 DIAGNOSIS — I25.10 CAD (CORONARY ARTERY DISEASE): ICD-10-CM

## 2018-11-06 PROCEDURE — 93018 CV STRESS TEST I&R ONLY: CPT | Performed by: INTERNAL MEDICINE

## 2018-11-06 PROCEDURE — 25500064 ZZH RX 255 OP 636: Performed by: INTERNAL MEDICINE

## 2018-11-06 PROCEDURE — 93016 CV STRESS TEST SUPVJ ONLY: CPT | Performed by: INTERNAL MEDICINE

## 2018-11-06 PROCEDURE — 93325 DOPPLER ECHO COLOR FLOW MAPG: CPT | Mod: 26 | Performed by: INTERNAL MEDICINE

## 2018-11-06 PROCEDURE — 93321 DOPPLER ECHO F-UP/LMTD STD: CPT | Mod: 26 | Performed by: INTERNAL MEDICINE

## 2018-11-06 PROCEDURE — 93350 STRESS TTE ONLY: CPT | Mod: 26 | Performed by: INTERNAL MEDICINE

## 2018-11-06 PROCEDURE — 93321 DOPPLER ECHO F-UP/LMTD STD: CPT | Mod: TC

## 2018-11-06 RX ADMIN — HUMAN ALBUMIN MICROSPHERES AND PERFLUTREN 9 ML: 10; .22 INJECTION, SOLUTION INTRAVENOUS at 11:27

## 2018-11-07 ENCOUNTER — OFFICE VISIT (OUTPATIENT)
Dept: CARDIOLOGY | Facility: CLINIC | Age: 58
End: 2018-11-07
Payer: COMMERCIAL

## 2018-11-07 VITALS
WEIGHT: 196.8 LBS | DIASTOLIC BLOOD PRESSURE: 72 MMHG | SYSTOLIC BLOOD PRESSURE: 120 MMHG | BODY MASS INDEX: 29.83 KG/M2 | HEIGHT: 68 IN | HEART RATE: 71 BPM

## 2018-11-07 DIAGNOSIS — E78.5 HYPERLIPIDEMIA LDL GOAL <70: ICD-10-CM

## 2018-11-07 DIAGNOSIS — I10 ESSENTIAL HYPERTENSION, BENIGN: ICD-10-CM

## 2018-11-07 DIAGNOSIS — I25.10 CORONARY ARTERY DISEASE INVOLVING NATIVE CORONARY ARTERY OF NATIVE HEART WITHOUT ANGINA PECTORIS: Primary | ICD-10-CM

## 2018-11-07 LAB
ALT SERPL W P-5'-P-CCNC: 26 U/L (ref 5–30)
ANION GAP SERPL CALCULATED.3IONS-SCNC: 12.2 MMOL/L (ref 6–17)
BUN SERPL-MCNC: 15 MG/DL (ref 7–30)
CALCIUM SERPL-MCNC: 9 MG/DL (ref 8.5–10.5)
CHLORIDE SERPL-SCNC: 108 MMOL/L (ref 98–107)
CHOLEST SERPL-MCNC: 168 MG/DL
CO2 SERPL-SCNC: 23 MMOL/L (ref 23–29)
CREAT SERPL-MCNC: 1.26 MG/DL (ref 0.7–1.3)
GFR SERPL CREATININE-BSD FRML MDRD: 58 ML/MIN/1.7M2
GLUCOSE SERPL-MCNC: 106 MG/DL (ref 70–105)
HDLC SERPL-MCNC: 46 MG/DL
LDLC SERPL CALC-MCNC: 89 MG/DL
NONHDLC SERPL-MCNC: 122 MG/DL
POTASSIUM SERPL-SCNC: 4.2 MMOL/L (ref 3.5–5.1)
SODIUM SERPL-SCNC: 139 MMOL/L (ref 136–145)
TRIGL SERPL-MCNC: 163 MG/DL

## 2018-11-07 PROCEDURE — 99213 OFFICE O/P EST LOW 20 MIN: CPT | Performed by: INTERNAL MEDICINE

## 2018-11-07 PROCEDURE — 36415 COLL VENOUS BLD VENIPUNCTURE: CPT | Performed by: INTERNAL MEDICINE

## 2018-11-07 PROCEDURE — 80048 BASIC METABOLIC PNL TOTAL CA: CPT | Performed by: INTERNAL MEDICINE

## 2018-11-07 PROCEDURE — 84460 ALANINE AMINO (ALT) (SGPT): CPT | Performed by: INTERNAL MEDICINE

## 2018-11-07 PROCEDURE — 80061 LIPID PANEL: CPT | Performed by: INTERNAL MEDICINE

## 2018-11-07 NOTE — PROGRESS NOTES
HPI and Plan:   See dictation    Orders Placed This Encounter   Procedures     Lipid Profile     ALT     Follow-Up with Cardiologist       No orders of the defined types were placed in this encounter.      There are no discontinued medications.      Encounter Diagnoses   Name Primary?     Hyperlipidemia LDL goal <70      Coronary artery disease involving native coronary artery of native heart without angina pectoris Yes       CURRENT MEDICATIONS:  Current Outpatient Prescriptions   Medication Sig Dispense Refill     aspirin 81 MG tablet Take  by mouth daily.       buPROPion (WELLBUTRIN XL) 150 MG 24 hr tablet TAKE 1 TABLET BY MOUTH EVERY MORNING 30 tablet 0     citalopram (CELEXA) 20 MG tablet TAKE 1 TABLET BY MOUTH DAILY 90 tablet 1     ezetimibe (ZETIA) 10 MG tablet Take 1 tablet (10 mg) by mouth daily 90 tablet 0     fluticasone (FLONASE) 50 MCG/ACT spray Spray 2 sprays into both nostrils daily 1 Bottle 11     ranitidine (ZANTAC) 150 MG tablet Take 150 mg by mouth       rosuvastatin (CRESTOR) 20 MG tablet Take 1 tablet (20 mg) by mouth daily 90 tablet 0       ALLERGIES     Allergies   Allergen Reactions     Atorvastatin      Muscle aches     Questran [Cholestyramine]      Nausea       PAST MEDICAL HISTORY:  Past Medical History:   Diagnosis Date     Acute duodenal ulcer with bleeding 1980s     Coronary artery disease      Eczema      Hyperlipidemia LDL goal < 70     intolerant to high dose statins     Hypertension      Mild major depression (H)        PAST SURGICAL HISTORY:  Past Surgical History:   Procedure Laterality Date     ANGIOPLASTY  08/2010    LAD PTCA/JIA     HERNIORRHAPHY INGUINAL      right, x2       FAMILY HISTORY:  Family History   Problem Relation Age of Onset     HEART DISEASE Father      MI approx age 63     Cerebrovascular Disease Paternal Uncle      Diabetes No family hx of      Hypertension No family hx of        SOCIAL HISTORY:  Social History     Social History     Marital status:      " Spouse name: N/A     Number of children: N/A     Years of education: N/A     Occupational History     [employer]      Social History Main Topics     Smoking status: Never Smoker     Smokeless tobacco: Never Used     Alcohol use Yes      Comment: 2 glasses of wine most nights     Drug use: No     Sexual activity: Yes     Partners: Female     Other Topics Concern     Caffeine Concern No     Sleep Concern Yes     snoring     Weight Concern No     Special Diet No     Exercise Yes     Active     Seat Belt Yes     Parent/Sibling W/ Cabg, Mi Or Angioplasty Before 65f 55m? No     Social History Narrative       Review of Systems:  Skin:  Negative       Eyes:  Positive for glasses (reading)    ENT:  Negative      Respiratory:  Positive for cough     Cardiovascular:    Positive for;dizziness;lightheadedness    Gastroenterology: Positive for heartburn;reflux    Genitourinary:  Negative      Musculoskeletal:  Negative      Neurologic:  Negative      Psychiatric:  Positive for depression    Heme/Lymph/Imm:  Positive for allergies rx allergies  Endocrine:  Negative        Physical Exam:  Vitals: /72  Pulse 71  Ht 1.727 m (5' 8\")  Wt 89.3 kg (196 lb 12.8 oz)  BMI 29.92 kg/m2    Constitutional:  cooperative, alert and oriented, well developed, well nourished, in no acute distress        Skin:  warm and dry to the touch, no apparent skin lesions or masses noted          Head:  normocephalic, no masses or lesions        Eyes:  pupils equal and round, conjunctivae and lids unremarkable, sclera white, no xanthalasma, EOMS intact, no nystagmus        Lymph:      ENT:  no pallor or cyanosis, dentition good        Neck:  carotid pulses are full and equal bilaterally, JVP normal, no carotid bruit        Respiratory:  normal breath sounds, clear to auscultation, normal A-P diameter, normal symmetry, normal respiratory excursion, no use of accessory muscles         Cardiac: regular rhythm, " normal S1/S2, no S3 or S4, apical impulse not displaced, no murmurs, gallops or rubs                pulses full and equal, no bruits auscultated                                        GI:  abdomen soft;BS normoactive        Extremities and Muscular Skeletal:  no deformities, clubbing, cyanosis, erythema observed;no edema              Neurological:  no gross motor deficits;affect appropriate        Psych:  Alert and Oriented x 3        CC  No referring provider defined for this encounter.

## 2018-11-07 NOTE — PROGRESS NOTES
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  He is a 58-year-old gentleman with a history of stenting of the LAD in 2010.  He has a history of statin intolerance, primarily to atorvastatin, which caused muscle pains.  He has been able to tolerate a lower dose of rosuvastatin up to 20 mg a day, but this has not controlled his cholesterol adequately.  I have added Zetia and still his LDL is slightly above goal.  However, I think we have his cholesterol medication program maximized.  Based on his tolerance of these medications and current cholesterol numbers, he would not be a candidate for a PCSK9 inhibitor.      Fortunately, he seems to be feeling well from a cardiac standpoint.  He had some tightness or pressure in the upper portions of his chest recently and we ordered a stress test to evaluate that further.  He completed that stress echocardiogram yesterday and it was normal.  He was able to exercise for 9 minutes and 30 seconds without chest pain or EKG changes and his echo looked normal.  His symptoms have since resolved and he believes they are probably due to some lung issues.      His LDL today is 89, HDL 46, triglycerides 163 and total cholesterol 168.        On examination today, his heart rate was 71 beats per minute, blood pressure 120/72, respiratory rate 16, weight 196 pounds.  His lungs are clear.  His heart rhythm is regular.  He has no cardiac murmurs or carotid bruits.      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  He has done well since then without any recurrent coronary artery disease issues as long as we have tried to control his cholesterol numbers  aggressively.  He is able to tolerate Crestor 20 mg a day plus Zetia and his cholesterol numbers are satisfactory, although not quite optimal.  He recently stopped lisinopril because of some cough and lightheadedness.  His blood pressures are still excellent.  I do not think we need to consider an alternative medication to that at this point.      I encouraged him to exercise regularly and eat well.  He has a goal of losing 20 pounds in the next year prior to his son's wedding next summer and I gave him some strategies regarding his diet and exercise to accomplish that.  Hopefully, he can do that.      cc:   Tha Ortega MD   [address]  [id number]         ASHTYN VELEZ MD, [facility]             D: 2018   T: 2018   MT: MARILEE      Name:     HUBERT MIMS   MRN:      [id number]        Account:      [id number]   :      1960           Service Date: 2018      Document: [id number]

## 2018-11-07 NOTE — LETTER
11/7/2018    Tha Ortega MD  [address] [facility] [address]    RE: Hermelindo Duffy       Dear Colleague,    I had the pleasure of seeing Hermelindo Duffy in the [facility].    HPI and Plan:   See dictation    Orders Placed This Encounter   Procedures     Lipid Profile     ALT     Follow-Up with Cardiologist       No orders of the defined types were placed in this encounter.      There are no discontinued medications.      Encounter Diagnoses   Name Primary?     Hyperlipidemia LDL goal <70      Coronary artery disease involving native coronary artery of native heart without angina pectoris Yes       CURRENT MEDICATIONS:  Current Outpatient Prescriptions   Medication Sig Dispense Refill     aspirin 81 MG tablet Take  by mouth daily.       buPROPion (WELLBUTRIN XL) 150 MG 24 hr tablet TAKE 1 TABLET BY MOUTH EVERY MORNING 30 tablet 0     citalopram (CELEXA) 20 MG tablet TAKE 1 TABLET BY MOUTH DAILY 90 tablet 1     ezetimibe (ZETIA) 10 MG tablet Take 1 tablet (10 mg) by mouth daily 90 tablet 0     fluticasone (FLONASE) 50 MCG/ACT spray Spray 2 sprays into both nostrils daily 1 Bottle 11     ranitidine (ZANTAC) 150 MG tablet Take 150 mg by mouth       rosuvastatin (CRESTOR) 20 MG tablet Take 1 tablet (20 mg) by mouth daily 90 tablet 0       ALLERGIES     Allergies   Allergen Reactions     Atorvastatin      Muscle aches     Questran [Cholestyramine]      Nausea       PAST MEDICAL HISTORY:  Past Medical History:   Diagnosis Date     Acute duodenal ulcer with bleeding 1980s     Coronary artery disease      Eczema      Hyperlipidemia LDL goal < 70     intolerant to high dose statins     Hypertension      Mild major depression (H)        PAST SURGICAL HISTORY:  Past Surgical History:   Procedure Laterality Date     ANGIOPLASTY  08/2010    LAD PTCA/JIA     HERNIORRHAPHY INGUINAL      right, x2       FAMILY HISTORY:  Family History   Problem Relation Age of Onset     HEART  "DISEASE Father      MI approx age 63     Cerebrovascular Disease Paternal Uncle      Diabetes No family hx of      Hypertension No family hx of        SOCIAL HISTORY:  Social History     Social History     Marital status:      Spouse name: N/A     Number of children: N/A     Years of education: N/A     Occupational History     [employer]      Social History Main Topics     Smoking status: Never Smoker     Smokeless tobacco: Never Used     Alcohol use Yes      Comment: 2 glasses of wine most nights     Drug use: No     Sexual activity: Yes     Partners: Female     Other Topics Concern     Caffeine Concern No     Sleep Concern Yes     snoring     Weight Concern No     Special Diet No     Exercise Yes     Active     Seat Belt Yes     Parent/Sibling W/ Cabg, Mi Or Angioplasty Before 65f 55m? No     Social History Narrative       Review of Systems:  Skin:  Negative       Eyes:  Positive for glasses (reading)    ENT:  Negative      Respiratory:  Positive for cough     Cardiovascular:    Positive for;dizziness;lightheadedness    Gastroenterology: Positive for heartburn;reflux    Genitourinary:  Negative      Musculoskeletal:  Negative      Neurologic:  Negative      Psychiatric:  Positive for depression    Heme/Lymph/Imm:  Positive for allergies rx allergies  Endocrine:  Negative        Physical Exam:  Vitals: /72  Pulse 71  Ht 1.727 m (5' 8\")  Wt 89.3 kg (196 lb 12.8 oz)  BMI 29.92 kg/m2    Constitutional:  cooperative, alert and oriented, well developed, well nourished, in no acute distress        Skin:  warm and dry to the touch, no apparent skin lesions or masses noted          Head:  normocephalic, no masses or lesions        Eyes:  pupils equal and round, conjunctivae and lids unremarkable, sclera white, no xanthalasma, EOMS intact, no nystagmus        Lymph:      ENT:  no pallor or cyanosis, dentition good        Neck:  carotid pulses are full and equal " bilaterally, JVP normal, no carotid bruit        Respiratory:  normal breath sounds, clear to auscultation, normal A-P diameter, normal symmetry, normal respiratory excursion, no use of accessory muscles         Cardiac: regular rhythm, normal S1/S2, no S3 or S4, apical impulse not displaced, no murmurs, gallops or rubs                pulses full and equal, no bruits auscultated                                        GI:  abdomen soft;BS normoactive        Extremities and Muscular Skeletal:  no deformities, clubbing, cyanosis, erythema observed;no edema              Neurological:  no gross motor deficits;affect appropriate        Psych:  Alert and Oriented x 3        CC  No referring provider defined for this encounter.                Thank you for allowing me to participate in the care of your patient.      Sincerely,     ASHTYN VELEZ MD     [facility]    cc:   No referring provider defined for this encounter.

## 2018-11-07 NOTE — LETTER
11/7/2018      Tha Ortega MD  [address] [facility] [phone]      RE: Hermelindo YOUNG Clive       Dear Colleague,    I had the pleasure of seeing Hermelindo Duffy in the [facility].    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  He is a 58-year-old gentleman with a history of stenting of the LAD in 2010.  He has a history of statin intolerance, primarily to atorvastatin, which caused muscle pains.  He has been able to tolerate a lower dose of rosuvastatin up to 20 mg a day, but this has not controlled his cholesterol adequately.  I have added Zetia and still his LDL is slightly above goal.  However, I think we have his cholesterol medication program maximized.  Based on his tolerance of these medications and current cholesterol numbers, he would not be a candidate for a PCSK9 inhibitor.      Fortunately, he seems to be feeling well from a cardiac standpoint.  He had some tightness or pressure in the upper portions of his chest recently and we ordered a stress test to evaluate that further.  He completed that stress echocardiogram yesterday and it was normal.  He was able to exercise for 9 minutes and 30 seconds without chest pain or EKG changes and his echo looked normal.  His symptoms have since resolved and he believes they are probably due to some lung issues.      His LDL today is 89, HDL 46, triglycerides 163 and total cholesterol 168.        On examination today, his heart rate was 71 beats per minute, blood pressure 120/72, respiratory rate 16, weight 196 pounds.  His lungs are clear.  His heart rhythm is regular.  He has no cardiac murmurs or carotid bruits.      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  He has done well since then without any recurrent coronary artery disease issues as long as we have tried to control his cholesterol numbers aggressively.  He is able to tolerate Crestor 20 mg a day plus Zetia and his cholesterol numbers are satisfactory, although not quite optimal.  He recently stopped lisinopril because of some cough and lightheadedness.  His blood pressures are still excellent.  I do not think we need to consider an alternative medication to that at this point.      I encouraged him to exercise regularly and eat well.  He has a goal of losing 20 pounds in the next year prior to his son's wedding next summer and I gave him some strategies regarding his diet and exercise to accomplish that.  Hopefully, he can do that.      cc:   Tha Ortega MD   [address]  [phone]         ASHTYN VELEZ MD, [facility]             D: 2018   T: 2018   MT: MARILEE      Name:     HUBERT MIMS   MRN:      [id number]        Account:      [id number]   :      1960           Service Date: 2018      Document: [id number]         Outpatient Encounter Prescriptions as of 2018   Medication Sig Dispense Refill     aspirin 81 MG tablet Take  by mouth daily.       buPROPion (WELLBUTRIN XL) 150 MG 24 hr tablet TAKE 1 TABLET BY MOUTH EVERY MORNING 30 tablet 0     citalopram (CELEXA) 20 MG tablet TAKE 1 TABLET BY MOUTH DAILY 90 tablet 1     ezetimibe (ZETIA) 10 MG tablet Take 1 tablet (10 mg) by mouth daily 90 tablet 0     fluticasone (FLONASE) 50 MCG/ACT spray Spray 2 sprays into both nostrils daily 1 Bottle 11     ranitidine (ZANTAC) 150 MG tablet Take 150 mg by mouth       rosuvastatin (CRESTOR) 20 MG tablet Take 1 tablet (20 mg) by mouth daily 90 tablet 0     No facility-administered encounter medications on file as of 2018.        Again, thank you for  allowing me to participate in the care of your patient.      Sincerely,    ASHTYN VELEZ MD     [facility]

## 2018-11-07 NOTE — MR AVS SNAPSHOT
After Visit Summary   11/7/2018    Hermelindo Duffy    MRN: [id number]           Patient Information     Date Of Birth          1960        Visit Information        Provider Department      11/7/2018 10:45 AM Sumit Carranza MD [facility]        Today's Diagnoses     Essential hypertension, benign (HTN)    -  1    Hyperlipidemia LDL goal <70        Coronary artery disease involving native coronary artery of native heart without angina pectoris           Follow-ups after your visit        Additional Services     Follow-Up with Cardiologist                 Future tests that were ordered for you today     Open Future Orders        Priority Expected Expires Ordered    Lipid Profile Routine 11/7/2019 11/7/2019 11/7/2018    ALT Routine 11/7/2019 11/7/2019 11/7/2018    Follow-Up with Cardiologist Routine 11/7/2019 11/8/2019 11/7/2018    ECHO STRESS WITH OPTISON Routine 10/21/2018 9/21/2019 9/21/2018            Who to contact     If you have questions or need follow up information about today's clinic visit or your schedule please contact [facility] directly at [phone].  Normal or non-critical lab and imaging results will be communicated to you by MyChart, letter or phone within 4 business days after the clinic has received the results. If you do not hear from us within 7 days, please contact the clinic through MyChart or phone. If you have a critical or abnormal lab result, we will notify you by phone as soon as possible.  Submit refill requests through [employer] or call your pharmacy and they will forward the refill request to us. Please allow 3 business days for your refill to be completed.          Additional Information About Your Visit        Care EveryWhere ID     This is your Care EveryWhere ID. This could be used by other organizations to access your [place] medical records  [id number]        Your Vitals Were   "   Pulse Height BMI (Body Mass Index)             71 1.727 m (5' 8\") 29.92 kg/m2          Blood Pressure from Last 3 Encounters:   11/07/18 137/78   11/06/17 104/76   07/28/17 102/70    Weight from Last 3 Encounters:   11/07/18 89.3 kg (196 lb 12.8 oz)   11/06/17 85.2 kg (187 lb 14.4 oz)   07/28/17 83.9 kg (185 lb)               Primary Care Provider Office Phone # Fax #    Tha Ortega [phone]       [address] [facility] [address]        Equal Access to Services     BETO BENTON : Farzana Maurice, walexx nixon, qaybta kaalmada hebert, ravi suresh. So [facility] [phone].    ATENCIÓN: Si habla español, tiene a brooks disposición servicios gratuitos de asistencia lingüística. Llame al [phone].    We comply with applicable federal civil rights laws and [place] laws. We do not discriminate on the basis of race, color, national origin, age, disability, sex, sexual orientation, or gender identity.            Thank you!     Thank you for choosing [facility]  for your care. Our goal is always to provide you with excellent care. Hearing back from our patients is one way we can continue to improve our services. Please take a few minutes to complete the written survey that you may receive in the mail after your visit with us. Thank you!             Your Updated Medication List - Protect others around you: Learn how to safely use, store and throw away your medicines at www.disposemymeds.org.          This list is accurate as of 11/7/18 11:08 AM.  Always use your most recent med list.                   Brand Name Dispense Instructions for use Diagnosis    aspirin 81 MG tablet      Take  by mouth daily.        buPROPion 150 MG 24 hr tablet    WELLBUTRIN XL    30 tablet    TAKE 1 TABLET BY MOUTH EVERY MORNING    Major depressive disorder, recurrent episode, mild (H)       citalopram 20 MG tablet    celeXA "    90 tablet    TAKE 1 TABLET BY MOUTH DAILY    Major depressive disorder, recurrent episode, mild (H)       ezetimibe 10 MG tablet    ZETIA    90 tablet    Take 1 tablet (10 mg) by mouth daily    Coronary artery disease involving native coronary artery of native heart without angina pectoris, Hyperlipidemia LDL goal <70       fluticasone 50 MCG/ACT spray    FLONASE    1 Bottle    Spray 2 sprays into both nostrils daily    Post-nasal drainage       ranitidine 150 MG tablet    ZANTAC     Take 150 mg by mouth        rosuvastatin 20 MG tablet    CRESTOR    90 tablet    Take 1 tablet (20 mg) by mouth daily    Hypercholesterolemia

## 2018-11-21 DIAGNOSIS — F33.0 MAJOR DEPRESSIVE DISORDER, RECURRENT EPISODE, MILD (H): ICD-10-CM

## 2018-11-21 RX ORDER — BUPROPION HYDROCHLORIDE 150 MG/1
TABLET ORAL
Qty: 30 TABLET | Refills: 0 | Status: SHIPPED | OUTPATIENT
Start: 2018-11-21 | End: 2018-11-27

## 2018-11-21 NOTE — TELEPHONE ENCOUNTER
"Requested Prescriptions   Pending Prescriptions Disp Refills     buPROPion (WELLBUTRIN XL) 150 MG 24 hr tablet [Pharmacy Med Name: BUPROPION HCL  MG TABLET] 30 tablet 0     Sig: TAKE 1 TABLET BY MOUTH EVERY DAY IN THE MORNING. PT NEEDS APPT.    SSRIs Protocol Failed    11/21/2018 11:07 AM       Failed - PHQ-9 score less than 5 in past 6 months    Please review last PHQ-9 score.          Passed - Medication is Bupropion    If the medication is Bupropion (Wellbutrin), and the patient is taking for smoking cessation; OK to refill.         Passed - Patient is age 18 or older       Passed - Recent (6 mo) or future (30 days) visit within the authorizing provider's specialty    Patient had office visit in the last 6 months or has a visit in the next 30 days with authorizing provider or within the authorizing provider's specialty.  See \"Patient Info\" tab in inbasket, or \"Choose Columns\" in Meds & Orders section of the refill encounter.            PHQ-9 SCORE 8/7/2015 10/6/2015 3/23/2018   Total Score 9 - -   Total Score - 1 0     Routing refill request to provider for review/approval because:  Juju given x1 and patient did not follow up, please advise  Patient needs to be seen because it has been more than 1 year since last office visit.        "

## 2018-11-26 DIAGNOSIS — E78.00 HYPERCHOLESTEROLEMIA: ICD-10-CM

## 2018-11-26 RX ORDER — ROSUVASTATIN CALCIUM 20 MG/1
20 TABLET, COATED ORAL DAILY
Qty: 90 TABLET | Refills: 3 | Status: SHIPPED | OUTPATIENT
Start: 2018-11-26 | End: 2020-02-18

## 2018-11-27 ENCOUNTER — OFFICE VISIT (OUTPATIENT)
Dept: INTERNAL MEDICINE | Facility: CLINIC | Age: 58
End: 2018-11-27
Payer: COMMERCIAL

## 2018-11-27 VITALS
BODY MASS INDEX: 29.8 KG/M2 | TEMPERATURE: 97.9 F | RESPIRATION RATE: 20 BRPM | WEIGHT: 196 LBS | SYSTOLIC BLOOD PRESSURE: 114 MMHG | OXYGEN SATURATION: 96 % | DIASTOLIC BLOOD PRESSURE: 64 MMHG | HEART RATE: 72 BPM

## 2018-11-27 DIAGNOSIS — F33.0 MAJOR DEPRESSIVE DISORDER, RECURRENT EPISODE, MILD (H): Primary | ICD-10-CM

## 2018-11-27 DIAGNOSIS — Z23 NEED FOR VACCINATION: ICD-10-CM

## 2018-11-27 DIAGNOSIS — I25.10 CORONARY ARTERY DISEASE INVOLVING NATIVE CORONARY ARTERY OF NATIVE HEART WITHOUT ANGINA PECTORIS: ICD-10-CM

## 2018-11-27 DIAGNOSIS — F32.0 MILD MAJOR DEPRESSION (H): ICD-10-CM

## 2018-11-27 PROCEDURE — 99214 OFFICE O/P EST MOD 30 MIN: CPT | Performed by: INTERNAL MEDICINE

## 2018-11-27 PROCEDURE — 90472 IMMUNIZATION ADMIN EACH ADD: CPT | Performed by: INTERNAL MEDICINE

## 2018-11-27 PROCEDURE — 90750 HZV VACC RECOMBINANT IM: CPT | Performed by: INTERNAL MEDICINE

## 2018-11-27 PROCEDURE — 90471 IMMUNIZATION ADMIN: CPT | Performed by: INTERNAL MEDICINE

## 2018-11-27 PROCEDURE — 90670 PCV13 VACCINE IM: CPT | Performed by: INTERNAL MEDICINE

## 2018-11-27 RX ORDER — BUPROPION HYDROCHLORIDE 150 MG/1
150 TABLET ORAL EVERY MORNING
Qty: 90 TABLET | Refills: 3 | Status: SHIPPED | OUTPATIENT
Start: 2018-11-27 | End: 2019-08-07

## 2018-11-27 RX ORDER — CITALOPRAM HYDROBROMIDE 20 MG/1
20 TABLET ORAL DAILY
Qty: 90 TABLET | Refills: 3 | Status: SHIPPED | OUTPATIENT
Start: 2018-11-27 | End: 2019-10-03

## 2018-11-27 ASSESSMENT — ANXIETY QUESTIONNAIRES
7. FEELING AFRAID AS IF SOMETHING AWFUL MIGHT HAPPEN: NOT AT ALL
IF YOU CHECKED OFF ANY PROBLEMS ON THIS QUESTIONNAIRE, HOW DIFFICULT HAVE THESE PROBLEMS MADE IT FOR YOU TO DO YOUR WORK, TAKE CARE OF THINGS AT HOME, OR GET ALONG WITH OTHER PEOPLE: NOT DIFFICULT AT ALL
5. BEING SO RESTLESS THAT IT IS HARD TO SIT STILL: NOT AT ALL
GAD7 TOTAL SCORE: 0
6. BECOMING EASILY ANNOYED OR IRRITABLE: NOT AT ALL
1. FEELING NERVOUS, ANXIOUS, OR ON EDGE: NOT AT ALL
3. WORRYING TOO MUCH ABOUT DIFFERENT THINGS: NOT AT ALL
2. NOT BEING ABLE TO STOP OR CONTROL WORRYING: NOT AT ALL

## 2018-11-27 ASSESSMENT — PATIENT HEALTH QUESTIONNAIRE - PHQ9
SUM OF ALL RESPONSES TO PHQ QUESTIONS 1-9: 2
5. POOR APPETITE OR OVEREATING: NOT AT ALL

## 2018-11-27 NOTE — PROGRESS NOTES
SUBJECTIVE:   Hermelindo Duffy is a 58 year old male who presents to clinic today for the following health issues:    Depression Followup    Status since last visit: Stable     See PHQ-9 for current symptoms.  Other associated symptoms: None    Complicating factors:   Significant life event:  No   Current substance abuse:  None  Anxiety or Panic symptoms:  No    PHQ 10/6/2015 3/23/2018   PHQ-9 Total Score 1 0   Q9: Suicide Ideation Not at all Not at all       PHQ-9  English  PHQ-9   Any Language  Suicide Assessment Five-step Evaluation and Treatment (SAFE-T)    Vascular Disease Follow-up:  Coronary Artery Disease (CAD)      Chest pain or pressure, left side neck or arm pain: No    Shortness of breath/increased sweats/nausea with exertion: No    Pain in calves walking 1-2 blocks: No    Worsened or new symptoms since last visit: No    Nitroglycerin use: no    Daily aspirin use: Yes      Problem list and histories reviewed & adjusted, as indicated.  Additional history: as documented    Labs reviewed in EPIC    Reviewed and updated as needed this visit by clinical staff  Tobacco  Allergies  Meds  Med Hx  Surg Hx  Fam Hx  Soc Hx      Reviewed and updated as needed this visit by Provider         ROS:  Constitutional, HEENT, cardiovascular, pulmonary, gi and gu systems are negative, except as otherwise noted.    OBJECTIVE:                                                    /64  Pulse 72  Temp 97.9  F (36.6  C) (Oral)  Resp 20  Wt 196 lb (88.9 kg)  SpO2 96%  BMI 29.8 kg/m2  Body mass index is 29.8 kg/(m^2).  GENERAL APPEARANCE: healthy, alert and no distress  HENT: nose and mouth without ulcers or lesions and normal cephalic/atraumatic  NECK: no adenopathy, no asymmetry, masses, or scars and thyroid normal to palpation  RESP: lungs clear to auscultation - no rales, rhonchi or wheezes  CV: regular rates and rhythm, normal S1 S2, no S3 or S4 and no murmur, click or rub  MS: extremities normal- no gross  deformities noted    Diagnostic test results:  none      ASSESSMENT/PLAN:                                                    1. Major depressive disorder, recurrent episode, mild (H)  Well controlled.   - buPROPion (WELLBUTRIN XL) 150 MG 24 hr tablet; Take 1 tablet (150 mg) by mouth every morning  Dispense: 90 tablet; Refill: 3  - citalopram (CELEXA) 20 MG tablet; Take 1 tablet (20 mg) by mouth daily  Dispense: 90 tablet; Refill: 3    2.  Coronary artery disease involving native coronary artery of native heart without angina pectoris  Stable. On statin + zetia.  asa      Advised f/u for wellness visit 2019    Tha Ortega MD  [facility]

## 2018-11-27 NOTE — MR AVS SNAPSHOT
After Visit Summary   11/27/2018    Hermelindo Duffy    MRN: [id number]           Patient Information     Date Of Birth          1960        Visit Information        Provider Department      11/27/2018 11:40 AM Tha Ortega MD [facility]        Today's Diagnoses     Mild major depression (H)    -  1    Coronary artery disease involving native coronary artery of native heart without angina pectoris        Major depressive disorder, recurrent episode, mild (H)           Follow-ups after your visit        Follow-up notes from your care team     Return in about 6 months (around 5/27/2019) for Wellness Visit.      Who to contact     If you have questions or need follow up information about today's clinic visit or your schedule please contact [facility] [place] [place] directly at [phone].  Normal or non-critical lab and imaging results will be communicated to you by MyChart, letter or phone within 4 business days after the clinic has received the results. If you do not hear from us within 7 days, please contact the clinic through MyChart or phone. If you have a critical or abnormal lab result, we will notify you by phone as soon as possible.  Submit refill requests through [employer] or call your pharmacy and they will forward the refill request to us. Please allow 3 business days for your refill to be completed.          Additional Information About Your Visit        Care EveryWhere ID     This is your Care EveryWhere ID. This could be used by other organizations to access your [place] medical records  [id number]        Your Vitals Were     Pulse Temperature Respirations Pulse Oximetry BMI (Body Mass Index)       72 97.9  F (36.6  C) (Oral) 20 96% 29.8 kg/m2        Blood Pressure from Last 3 Encounters:   11/27/18 114/64   11/07/18 120/72   11/06/17 104/76    Weight from Last 3 Encounters:   11/27/18 196 lb (88.9 kg)   11/07/18 196 lb 12.8 oz (89.3 kg)    11/06/17 187 lb 14.4 oz (85.2 kg)              Today, you had the following     No orders found for display         Today's Medication Changes          These changes are accurate as of 11/27/18 12:21 PM.  If you have any questions, ask your nurse or doctor.               These medicines have changed or have updated prescriptions.        Dose/Directions    buPROPion 150 MG 24 hr tablet   Commonly known as:  WELLBUTRIN XL   This may have changed:  See the new instructions.   Used for:  Major depressive disorder, recurrent episode, mild (H)   Changed by:  Tha Ortega MD        Dose:  150 mg   Take 1 tablet (150 mg) by mouth every morning   Quantity:  90 tablet   Refills:  3       citalopram 20 MG tablet   Commonly known as:  celeXA   This may have changed:  See the new instructions.   Used for:  Major depressive disorder, recurrent episode, mild (H)   Changed by:  Tha Ortega MD        Dose:  20 mg   Take 1 tablet (20 mg) by mouth daily   Quantity:  90 tablet   Refills:  3            Where to get your medicines      These medications were sent to [facility] [id number] - [place], [place] - [address]  [address] [place] [address]     Phone:  [phone]     buPROPion 150 MG 24 hr tablet    citalopram 20 MG tablet                Primary Care Provider Office Phone # Fax #    Tha Ortega [phone]       [address] [facility] [address]        Equal Access to Services     [facility] AH: Hadii fred ku hadasho Soflorinali, waaxda luqadaha, qaybta kaalmada adeegyada, ravi austin . So [facility] [phone].    ATENCIÓN: Si habla español, tiene a brooks disposición servicios gratuitos de asistencia lingüística. Llame al [phone].    We comply with applicable federal civil rights laws and [place] laws. We do not discriminate on the basis of race, color, national origin, age, disability, sex, sexual orientation, or gender identity.             Thank you!     Thank you for choosing [facility]  for your care. Our goal is always to provide you with excellent care. Hearing back from our patients is one way we can continue to improve our services. Please take a few minutes to complete the written survey that you may receive in the mail after your visit with us. Thank you!             Your Updated Medication List - Protect others around you: Learn how to safely use, store and throw away your medicines at www.disposemymeds.org.          This list is accurate as of 11/27/18 12:21 PM.  Always use your most recent med list.                   Brand Name Dispense Instructions for use Diagnosis    aspirin 81 MG tablet    ASA     Take  by mouth daily.        buPROPion 150 MG 24 hr tablet    WELLBUTRIN XL    90 tablet    Take 1 tablet (150 mg) by mouth every morning    Major depressive disorder, recurrent episode, mild (H)       citalopram 20 MG tablet    celeXA    90 tablet    Take 1 tablet (20 mg) by mouth daily    Major depressive disorder, recurrent episode, mild (H)       ezetimibe 10 MG tablet    ZETIA    90 tablet    Take 1 tablet (10 mg) by mouth daily    Coronary artery disease involving native coronary artery of native heart without angina pectoris, Hyperlipidemia LDL goal <70       fluticasone 50 MCG/ACT nasal spray    FLONASE    1 Bottle    Spray 2 sprays into both nostrils daily    Post-nasal drainage       ranitidine 150 MG tablet    ZANTAC     Take 150 mg by mouth        rosuvastatin 20 MG tablet    CRESTOR    90 tablet    Take 1 tablet (20 mg) by mouth daily    Hypercholesterolemia

## 2018-11-28 ASSESSMENT — ANXIETY QUESTIONNAIRES: GAD7 TOTAL SCORE: 0

## 2019-01-31 ENCOUNTER — OFFICE VISIT (OUTPATIENT)
Dept: INTERNAL MEDICINE | Facility: CLINIC | Age: 59
End: 2019-01-31
Payer: COMMERCIAL

## 2019-01-31 VITALS
SYSTOLIC BLOOD PRESSURE: 128 MMHG | HEIGHT: 68 IN | RESPIRATION RATE: 12 BRPM | BODY MASS INDEX: 29.87 KG/M2 | DIASTOLIC BLOOD PRESSURE: 82 MMHG | TEMPERATURE: 98.5 F | WEIGHT: 197.1 LBS | HEART RATE: 76 BPM

## 2019-01-31 DIAGNOSIS — S76.211A GROIN STRAIN, RIGHT, INITIAL ENCOUNTER: Primary | ICD-10-CM

## 2019-01-31 PROCEDURE — 99213 OFFICE O/P EST LOW 20 MIN: CPT | Performed by: INTERNAL MEDICINE

## 2019-01-31 SDOH — HEALTH STABILITY: MENTAL HEALTH: HOW MANY DRINKS CONTAINING ALCOHOL DO YOU HAVE ON A TYPICAL DAY WHEN YOU ARE DRINKING?: 1 OR 2

## 2019-01-31 SDOH — HEALTH STABILITY: MENTAL HEALTH: HOW OFTEN DO YOU HAVE A DRINK CONTAINING ALCOHOL?: 4 OR MORE TIMES A WEEK

## 2019-01-31 SDOH — HEALTH STABILITY: MENTAL HEALTH: HOW MANY STANDARD DRINKS CONTAINING ALCOHOL DO YOU HAVE ON A TYPICAL DAY?: 1 OR 2

## 2019-01-31 ASSESSMENT — MIFFLIN-ST. JEOR: SCORE: 1674.57

## 2019-01-31 NOTE — PROGRESS NOTES
"  SUBJECTIVE:   Hermelindo Duffy is a 58 year old male who presents to clinic today for the following health issues:      Musculoskeletal problem/pain      Duration: 1 week    Description  Location: right groin area    Intensity:  General aching and sharp pains when moving in a certain way    Accompanying signs and symptoms:  radiation of pain to leg - aching radiaites    History  Previous similar problem: YES- h/o hernia repair on right side - not in exact same spot  Previous evaluation:  none    Precipitating or alleviating factors:  Trauma or overuse: no   Aggravating factors include: climbing stairs once caused increase in pain - sometimes if coughs, sneezes clears throat    Therapies tried and outcome: standing helps      Problem list and histories reviewed & adjusted, as indicated.  Additional history: as documented    Labs reviewed in EPIC    Reviewed and updated as needed this visit by clinical staff  Tobacco  Allergies  Meds  Soc Hx      Reviewed and updated as needed this visit by Provider         ROS:  Constitutional, HEENT, cardiovascular, pulmonary, gi and gu systems are negative, except as otherwise noted.    OBJECTIVE:                                                    /82   Pulse 76   Temp 98.5  F (36.9  C) (Oral)   Resp 12   Ht 1.721 m (5' 7.75\")   Wt 89.4 kg (197 lb 1.6 oz)   BMI 30.19 kg/m    Body mass index is 30.19 kg/m .  GENERAL APPEARANCE: alert, no distress     (male): testicles normal without atrophy or masses, no hernias and penis normal without urethral discharge  MS: extremities normal- no gross deformities noted  Femoral pulses +2 bilat symmetric    Diagnostic test results:  none      ASSESSMENT/PLAN:                                                    1. Groin strain, right, initial encounter  No obvious hernia noted. symptoms most suggestive of strain   Ice, rest          Tha Ortega MD  [facility]    "

## 2019-02-04 DIAGNOSIS — I25.10 CORONARY ARTERY DISEASE INVOLVING NATIVE CORONARY ARTERY OF NATIVE HEART WITHOUT ANGINA PECTORIS: ICD-10-CM

## 2019-02-04 DIAGNOSIS — E78.5 HYPERLIPIDEMIA LDL GOAL <70: ICD-10-CM

## 2019-02-04 RX ORDER — EZETIMIBE 10 MG/1
10 TABLET ORAL DAILY
Qty: 90 TABLET | Refills: 3 | Status: SHIPPED | OUTPATIENT
Start: 2019-02-04 | End: 2020-03-06

## 2019-08-07 DIAGNOSIS — F33.0 MAJOR DEPRESSIVE DISORDER, RECURRENT EPISODE, MILD (H): ICD-10-CM

## 2019-08-07 RX ORDER — BUPROPION HYDROCHLORIDE 150 MG/1
150 TABLET ORAL EVERY MORNING
Qty: 30 TABLET | Refills: 0 | Status: SHIPPED | OUTPATIENT
Start: 2019-08-07 | End: 2019-09-04

## 2019-08-07 NOTE — TELEPHONE ENCOUNTER
"Requested Prescriptions   Pending Prescriptions Disp Refills     buPROPion (WELLBUTRIN XL) 150 MG 24 hr tablet [Pharmacy Med Name: BUPROPION HCL  MG TABLET] 90 tablet 3     Sig: TAKE 1 TABLET (150 MG) BY MOUTH EVERY MORNING       SSRIs Protocol Failed - 8/7/2019  2:35 PM        Failed - PHQ-9 score less than 5 in past 6 months     Please review last PHQ-9 score.           Failed - Recent (6 mo) or future (30 days) visit within the authorizing provider's specialty     Patient had office visit in the last 6 months or has a visit in the next 30 days with authorizing provider or within the authorizing provider's specialty.  See \"Patient Info\" tab in inbasket, or \"Choose Columns\" in Meds & Orders section of the refill encounter.            Passed - Medication is Bupropion     If the medication is Bupropion (Wellbutrin), and the patient is taking for smoking cessation; OK to refill.          Passed - Medication is active on med list        Passed - Patient is age 18 or older        PHQ-9 SCORE 10/6/2015 3/23/2018 11/27/2018   PHQ-9 Total Score - - -   PHQ-9 Total Score 1 0 2     Medication is being filled for 1 time refill only due to:  needs office visit    "

## 2019-09-04 DIAGNOSIS — F33.0 MAJOR DEPRESSIVE DISORDER, RECURRENT EPISODE, MILD (H): ICD-10-CM

## 2019-09-04 RX ORDER — BUPROPION HYDROCHLORIDE 150 MG/1
TABLET ORAL
Qty: 30 TABLET | Refills: 0 | Status: SHIPPED | OUTPATIENT
Start: 2019-09-04 | End: 2019-09-27

## 2019-09-04 NOTE — TELEPHONE ENCOUNTER
"Requested Prescriptions   Pending Prescriptions Disp Refills     buPROPion (WELLBUTRIN XL) 150 MG 24 hr tablet [Pharmacy Med Name: BUPROPION HCL  MG TABLET] 30 tablet 0     Sig: TAKE 1 TABLET BY MOUTH EVERY MORNING       SSRIs Protocol Failed - 9/4/2019  8:33 AM        Failed - PHQ-9 score less than 5 in past 6 months     Please review last PHQ-9 score.           Failed - Recent (6 mo) or future (30 days) visit within the authorizing provider's specialty     Patient had office visit in the last 6 months or has a visit in the next 30 days with authorizing provider or within the authorizing provider's specialty.  See \"Patient Info\" tab in inbasket, or \"Choose Columns\" in Meds & Orders section of the refill encounter.            Passed - Medication is Bupropion     If the medication is Bupropion (Wellbutrin), and the patient is taking for smoking cessation; OK to refill.          Passed - Medication is active on med list        Passed - Patient is age 18 or older        Routing refill request to provider for review/approval because:  Juju given x1 and patient did not follow up, please advise  Overdue for office visit        "

## 2019-09-06 NOTE — TELEPHONE ENCOUNTER
Called patient and left message to call back. Please give providers message to patient when he calls back.

## 2019-09-12 ENCOUNTER — CARE COORDINATION (OUTPATIENT)
Dept: CARDIOLOGY | Facility: CLINIC | Age: 59
End: 2019-09-12

## 2019-09-12 NOTE — PROGRESS NOTES
VM from pt requesting call back to discuss an appt w/Dr. Carranza. Returned the call, pt states he's confused why I am calling, said he was returning a call to scheduling who called him to make an appt w/Dr. Carranza, but they sent him to my VM. I let pt know it looks like he's due in November to see Dr. Carranza, but let pt know that he is full through November. I believe his December schedule opens on Monday. Pt said he will call back next week. I asked if pt has sx or concerns which he denied any and said he is just coming in for routine f/u. Asked pt to call us (nurse) PRN for any sx or concerns until he is able to get in with Dr. Carranza. Pt verbalized understanding. Kayla Katz RN on 9/12/2019 at 4:30 PM

## 2019-09-27 DIAGNOSIS — F33.0 MAJOR DEPRESSIVE DISORDER, RECURRENT EPISODE, MILD (H): ICD-10-CM

## 2019-09-27 NOTE — TELEPHONE ENCOUNTER
"Requested Prescriptions   Pending Prescriptions Disp Refills     buPROPion (WELLBUTRIN XL) 150 MG 24 hr tablet [Pharmacy Med Name: BUPROPION HCL  MG TABLET] 30 tablet 0     Sig: TAKE 1 TABLET BY MOUTH EVERY MORNING   Last Written Prescription Date:  9/4/2019  Last Fill Quantity: 30,  # refills: 0   Last Office Visit: 1/31/2019   Future Office Visit:    Next 5 appointments (look out 90 days)    Oct 03, 2019 10:00 AM CDT  [facility] [address] [id number] [phone]             SSRIs Protocol Failed - 9/27/2019  7:32 AM        Failed - PHQ-9 score less than 5 in past 6 months     Please review last PHQ-9 score.   PHQ-9 SCORE 10/6/2015 3/23/2018 11/27/2018   PHQ-9 Total Score - - -   PHQ-9 Total Score 1 0 2     ANITA-7 SCORE 11/27/2018   Total Score 0                   Passed - Medication is Bupropion     If the medication is Bupropion (Wellbutrin), and the patient is taking for smoking cessation; OK to refill.          Passed - Medication is active on med list        Passed - Patient is age 18 or older        Passed - Recent (6 mo) or future (30 days) visit within the authorizing provider's specialty     Patient had office visit in the last 6 months or has a visit in the next 30 days with authorizing provider or within the authorizing provider's specialty.  See \"Patient Info\" tab in inbasket, or \"Choose Columns\" in Meds & Orders section of the refill encounter.              "

## 2019-09-30 RX ORDER — BUPROPION HYDROCHLORIDE 150 MG/1
TABLET ORAL
Qty: 30 TABLET | Refills: 0 | Status: SHIPPED | OUTPATIENT
Start: 2019-09-30 | End: 2019-10-03

## 2019-10-03 ENCOUNTER — OFFICE VISIT (OUTPATIENT)
Dept: INTERNAL MEDICINE | Facility: CLINIC | Age: 59
End: 2019-10-03
Payer: COMMERCIAL

## 2019-10-03 VITALS
BODY MASS INDEX: 30.08 KG/M2 | HEART RATE: 77 BPM | OXYGEN SATURATION: 95 % | SYSTOLIC BLOOD PRESSURE: 112 MMHG | WEIGHT: 198.5 LBS | TEMPERATURE: 98.8 F | DIASTOLIC BLOOD PRESSURE: 76 MMHG | HEIGHT: 68 IN

## 2019-10-03 DIAGNOSIS — F33.0 MAJOR DEPRESSIVE DISORDER, RECURRENT EPISODE, MILD (H): Primary | ICD-10-CM

## 2019-10-03 DIAGNOSIS — G47.33 OSA (OBSTRUCTIVE SLEEP APNEA): ICD-10-CM

## 2019-10-03 DIAGNOSIS — Z12.5 SCREENING FOR PROSTATE CANCER: ICD-10-CM

## 2019-10-03 DIAGNOSIS — Z13.1 SCREENING FOR DIABETES MELLITUS: ICD-10-CM

## 2019-10-03 PROCEDURE — 90750 HZV VACC RECOMBINANT IM: CPT | Performed by: INTERNAL MEDICINE

## 2019-10-03 PROCEDURE — 90472 IMMUNIZATION ADMIN EACH ADD: CPT | Performed by: INTERNAL MEDICINE

## 2019-10-03 PROCEDURE — 99214 OFFICE O/P EST MOD 30 MIN: CPT | Mod: 25 | Performed by: INTERNAL MEDICINE

## 2019-10-03 PROCEDURE — 90682 RIV4 VACC RECOMBINANT DNA IM: CPT | Performed by: INTERNAL MEDICINE

## 2019-10-03 PROCEDURE — 90471 IMMUNIZATION ADMIN: CPT | Performed by: INTERNAL MEDICINE

## 2019-10-03 RX ORDER — CITALOPRAM HYDROBROMIDE 20 MG/1
20 TABLET ORAL DAILY
Qty: 90 TABLET | Refills: 3 | Status: SHIPPED | OUTPATIENT
Start: 2019-10-03 | End: 2020-12-31

## 2019-10-03 RX ORDER — BUPROPION HYDROCHLORIDE 150 MG/1
150 TABLET ORAL EVERY MORNING
Qty: 90 TABLET | Refills: 3 | Status: SHIPPED | OUTPATIENT
Start: 2019-10-03 | End: 2020-12-31

## 2019-10-03 ASSESSMENT — MIFFLIN-ST. JEOR: SCORE: 1675.92

## 2019-10-03 ASSESSMENT — PATIENT HEALTH QUESTIONNAIRE - PHQ9: SUM OF ALL RESPONSES TO PHQ QUESTIONS 1-9: 2

## 2019-10-03 NOTE — NURSING NOTE
"Chief Complaint   Patient presents with     Depression     recheck wellbutrin     /76   Pulse 77   Temp 98.8  F (37.1  C) (Temporal)   Ht 1.721 m (5' 7.75\")   Wt 90 kg (198 lb 8 oz)   SpO2 95%   BMI 30.40 kg/m   Estimated body mass index is 30.4 kg/m  as calculated from the following:    Height as of this encounter: 1.721 m (5' 7.75\").    Weight as of this encounter: 90 kg (198 lb 8 oz).        Health Maintenance due pending provider review:  PHQ9    PHQ/ACT/ANITA--Gave pt questionnare    Stefania Mariano CMA  "

## 2019-10-03 NOTE — PROGRESS NOTES
"Subjective     Hermelindo Duffy is a 59 year old male who presents to clinic today for the following health issues:    HPI   Depression Followup    How are you doing with your depression since your last visit? \"the same\"    Are you having other symptoms that might be associated with depression? No    Have you had a significant life event?  No     Are you feeling anxious or having panic attacks?   No    Do you have any concerns with your use of alcohol or other drugs? No    Social History     Tobacco Use     Smoking status: Never Smoker     Smokeless tobacco: Never Used   Substance Use Topics     Alcohol use: Yes     Alcohol/week: 2.0 standard drinks     Types: 2 Glasses of wine per week     Frequency: 4 or more times a week     Drinks per session: 1 or 2     Comment: 2 glasses of wine most nights     Drug use: No     PHQ 3/23/2018 11/27/2018 10/3/2019   PHQ-9 Total Score 0 2 2   Q9: Thoughts of better off dead/self-harm past 2 weeks Not at all Not at all Not at all     ANITA-7 SCORE 11/27/2018   Total Score 0           Suicide Assessment Five-step Evaluation and Treatment (SAFE-T)      How many servings of fruits and vegetables do you eat daily?  0-1    On average, how many sweetened beverages do you drink each day (soda, juice, sweet tea, etc)?   5 sodas per week    How many days per week do you miss taking your medication? 0                   Reviewed and updated as needed this visit by Provider  Tobacco  Soc Hx        Review of Systems   ROS COMP: Constitutional, HEENT, cardiovascular, pulmonary, gi and gu systems are negative, except as otherwise noted.      Objective    /76   Pulse 77   Temp 98.8  F (37.1  C) (Temporal)   Ht 1.721 m (5' 7.75\")   Wt 90 kg (198 lb 8 oz)   SpO2 95%   BMI 30.40 kg/m    Body mass index is 30.4 kg/m .  Physical Exam   GENERAL APPEARANCE: alert, no distress and over weight  HENT: NCAT      none         Assessment & Plan     1. Major depressive disorder, recurrent episode, " "mild (H)  Well controlled  - buPROPion (WELLBUTRIN XL) 150 MG 24 hr tablet; Take 1 tablet (150 mg) by mouth every morning  Dispense: 90 tablet; Refill: 3  - citalopram (CELEXA) 20 MG tablet; Take 1 tablet (20 mg) by mouth daily  Dispense: 90 tablet; Refill: 3    2. KIMBERLY (obstructive sleep apnea)- mild, no CPAP  Mild in 2013 but never got dental device- fatigue maybe partially related to this?   - SLEEP EVALUATION & MANAGEMENT REFERRAL - ADULT -[facility] [phone]  (Age 18 and up); Future    3. Screening for prostate cancer  - Prostate spec antigen screen; Future    4. Screening for diabetes mellitus  - Basic metabolic panel; Future     BMI:   Estimated body mass index is 30.4 kg/m  as calculated from the following:    Height as of this encounter: 1.721 m (5' 7.75\").    Weight as of this encounter: 90 kg (198 lb 8 oz).   Weight management plan: Discussed healthy diet and exercise guidelines        Work on weight loss  Regular exercise    Return for Wellness Visit with labs before.    Tha Ortega MD  [facility]        "

## 2019-12-19 ENCOUNTER — OFFICE VISIT (OUTPATIENT)
Dept: SLEEP MEDICINE | Facility: CLINIC | Age: 59
End: 2019-12-19
Attending: INTERNAL MEDICINE
Payer: COMMERCIAL

## 2019-12-19 VITALS
SYSTOLIC BLOOD PRESSURE: 122 MMHG | HEART RATE: 69 BPM | DIASTOLIC BLOOD PRESSURE: 80 MMHG | HEIGHT: 68 IN | OXYGEN SATURATION: 95 % | BODY MASS INDEX: 30.31 KG/M2 | WEIGHT: 200 LBS | RESPIRATION RATE: 16 BRPM

## 2019-12-19 DIAGNOSIS — G47.10 HYPERSOMNIA: ICD-10-CM

## 2019-12-19 DIAGNOSIS — G47.33 OSA (OBSTRUCTIVE SLEEP APNEA): ICD-10-CM

## 2019-12-19 DIAGNOSIS — J30.89 NON-SEASONAL ALLERGIC RHINITIS, UNSPECIFIED TRIGGER: ICD-10-CM

## 2019-12-19 DIAGNOSIS — I25.10 CORONARY ARTERY DISEASE INVOLVING NATIVE CORONARY ARTERY OF NATIVE HEART WITHOUT ANGINA PECTORIS: Primary | ICD-10-CM

## 2019-12-19 PROCEDURE — 99204 OFFICE O/P NEW MOD 45 MIN: CPT | Performed by: PEDIATRICS

## 2019-12-19 RX ORDER — CETIRIZINE HYDROCHLORIDE 10 MG/1
10 TABLET ORAL DAILY PRN
COMMUNITY
Start: 2019-11-17

## 2019-12-19 RX ORDER — FAMOTIDINE 20 MG/1
20 TABLET, FILM COATED ORAL DAILY PRN
COMMUNITY
End: 2022-04-22

## 2019-12-19 ASSESSMENT — MIFFLIN-ST. JEOR: SCORE: 1682.72

## 2019-12-19 NOTE — PATIENT INSTRUCTIONS
Your BMI is Body mass index is 30.63 kg/m .  Weight management is a personal decision.  If you are interested in exploring weight loss strategies, the following discussion covers the approaches that may be successful. Body mass index (BMI) is one way to tell whether you are at a healthy weight, overweight, or obese. It measures your weight in relation to your height.  A BMI of 18.5 to 24.9 is in the healthy range. A person with a BMI of 25 to 29.9 is considered overweight, and someone with a BMI of 30 or greater is considered obese. More than two-thirds of American adults are considered overweight or obese.  Being overweight or obese increases the risk for further weight gain. Excess weight may lead to heart disease and diabetes.  Creating and following plans for healthy eating and physical activity may help you improve your health.  Weight control is part of healthy lifestyle and includes exercise, emotional health, and healthy eating habits. Careful eating habits lifelong are the mainstay of weight control. Though there are significant health benefits from weight loss, long-term weight loss with diet alone may be very difficult to achieve- studies show long-term success with dietary management in less than 10% of people. Attaining a healthy weight may be especially difficult to achieve in those with severe obesity. In some cases, medications, devices and surgical management might be considered.  What can you do?  If you are overweight or obese and are interested in methods for weight loss, you should discuss this with your provider.     Consider reducing daily calorie intake by 500 calories.     Keep a food journal.     Avoiding skipping meals, consider cutting portions instead.    Diet combined with exercise helps maintain muscle while optimizing fat loss. Strength training is particularly important for building and maintaining muscle mass. Exercise helps reduce stress, increase energy, and improves fitness.  Increasing exercise without diet control, however, may not burn enough calories to loose weight.       Start walking three days a week 10-20 minutes at a time    Work towards walking thirty minutes five days a week     Eventually, increase the speed of your walking for 1-2 minutes at time    In addition, we recommend that you review healthy lifestyles and methods for weight loss available through the National Institutes of Health patient information sites:  http://win.niddk.nih.gov/publications/index.htm    And look into health and wellness programs that may be available through your health insurance provider, employer, local community center, or shagufta club.    Weight management plan: Patient was referred to their PCP to discuss a diet and exercise plan.

## 2019-12-19 NOTE — PROGRESS NOTES
[facility]  Outpatient Sleep Medicine Consultation, New Patient      Name: Hermelindo Duffy MRN# [id number]   Age: 59 year old YOB: 1960     Date of Consultation: December 19, 2019  Consultation is requested by: Tha Ortega MD  [address]  [address]  [place] [address]  Primary care provider: Tha Ortega           Assessment and Plan:     1. KIMBERLY (obstructive sleep apnea)- mild, no CPAP  Patient is being evaluated for Obstructive Sleep Apnea (KIMBERLY).  Risk factors for KIMBERLY include:  snoring, excessive daytime sleepiness/subjective tiredness, obesity, age > 50, and male gender.  He was diagnosed with mild obstructive sleep apnea in the past but symptoms have worsened over time.  This may change his therapy options.  Recommend HST since patient is high risk for KIMBERLY without any relevant comorbid conditions.  Counseling included a comprehensive review of diagnostic and therapeutic strategies as well as risks of inadequate therapy.  He will follow up with me in approximately two weeks after his sleep study has been competed to review the results and discuss plan of care.    2. Hypersomnia  The patient was strongly advised to avoid driving, operating any heavy machinery or engaging in other hazardous situations while drowsy or sleepy.  Patient was counseled on the importance of driving while alert and to pull over if drowsy.      3. Coronary artery disease involving native coronary artery of native heart without angina pectoris  I discussed the negative and bidirectional interactions of untreated sleep apnea with heart conditions, including atrial fibrillation, congestive heart failure, and risk for myocardial infarction and stroke.  Risk of coronary artery disease is increased in middle-aged adults with untreated KIMBERLY.  Possible mechanisms include endothelial dysfunction, hypercoagulability, insulin resistance, increases in pro-inflammatory  "cytokines and adhesion molecules, oxidative stress, and increased sympathetic activity during sleep.      4. Non-seasonal allergic rhinitis, unspecified trigger  Allergic rhinitis can increase upper airway resistance and exacerbate KIMBERLY.  Inability to breathe through the nose can make PAP therapy more difficult to tolerate.  I recommended that the patient begin a regimen of nasal saline followed by fluticasone nasal spray to optimize nasal patency.  Additionally, he may put allergen covers on his pillows to minimize dust mite exposure at night.         Chief Complaint/Reason for Consult:     \"Snoring.  Interrupting wife's sleep.  Waking tired.\"         History of Present Illness:     Hermelindo Duffy is a 59 year old male who I am seeing for the first time in my adult sleep medicine clinic.  He reports snoring has been present for several years and is disruptive to his wife's sleep.  She does not witness apneic events.  He experiences excessive daytime sleepiness which has worsened over time.  I noted that he had a polysomnogram study conducted on November 15, 2012.  I reviewed the results of this test which showed mild obstructive sleep apnea with an AHI of 13/h.  The oxyhemoglobin saturation ludwig was 86%.  There were no clinically significant leg movements and no hypoxemia during the study.  The patient understood that it was not necessary to treat his sleep apnea and did not ultimately pursue treatment.  He would consider an oral appliance for mild sleep apnea.    His score on the Raiford Sleepiness Scale is 10 out of 24.  The patient takes caffeine but not more than 3 drinks per day.  Bedtime is typically 1 AM and wake up time 9 AM.  He may change his sleep schedule based on the demands of his job duties as a [employer] .  Sleep latency is 2 minutes.  He may awaken due to uncomfortable position or breathing through his mouth.  He endorses chronic nasal congestion and symptoms of postnasal drip.  He has " "tried fluticasone nasal spray in the past which has been intermittently helpful.  On occasion he is experienced an unusual feeling in his legs such as \"they could not settle down\".  However, the sensation has not been problematic recently.  Both his father and mother were known to snore but did not undergo sleep evaluation.  Father passed away due to complications with emphysema.  The patient lives at home with his wife.    Medical history includes coronary artery stent placement in 2010 following episodes of angina.  He has a history of clinical depression, atopic dermatitis, and allergies as noted above.           Medications:     Current Outpatient Medications   Medication Sig     aspirin 81 MG tablet Take  by mouth daily.     buPROPion (WELLBUTRIN XL) 150 MG 24 hr tablet Take 1 tablet (150 mg) by mouth every morning     citalopram (CELEXA) 20 MG tablet Take 1 tablet (20 mg) by mouth daily     Dupilumab (DUPIXENT) 300 MG/2ML syringe Inject 300 mg Subcutaneous as needed Every other week as needed     ezetimibe (ZETIA) 10 MG tablet Take 1 tablet (10 mg) by mouth daily     famotidine (PEPCID) 20 MG tablet Take 20 mg by mouth 2 times daily     ibuprofen (ADVIL/MOTRIN) 100 MG tablet Take 100 mg by mouth as needed     cetirizine (ZYRTEC) 10 MG tablet      fluticasone (FLONASE) 50 MCG/ACT spray Spray 2 sprays into both nostrils daily     ranitidine (ZANTAC) 150 MG tablet Take 150 mg by mouth     rosuvastatin (CRESTOR) 20 MG tablet Take 1 tablet (20 mg) by mouth daily (Patient not taking: Reported on 12/19/2019)     No current facility-administered medications for this visit.         Allergies   Allergen Reactions     Atorvastatin      Muscle aches     Questran [Cholestyramine]      Nausea            Past Medical History:     Past Medical History:   Diagnosis Date     Acute duodenal ulcer with bleeding 1980s     Coronary artery disease      Eczema      Hyperlipidemia LDL goal < 70     intolerant to high dose statins     " "Hypertension      Mild major depression (H)              Past Surgical History:      Past Surgical History:   Procedure Laterality Date     ANGIOPLASTY  08/2010    LAD PTCA/JIA     HERNIORRHAPHY INGUINAL      right, x2            Social History:     Social History     Tobacco Use     Smoking status: Never Smoker     Smokeless tobacco: Never Used   Substance Use Topics     Alcohol use: Yes     Alcohol/week: 2.0 standard drinks     Types: 2 Glasses of wine per week     Frequency: 4 or more times a week     Drinks per session: 1 or 2     Comment: 2 glasses of wine most nights            Family History:     Family History   Problem Relation Age of Onset     Heart Disease Father         MI approx age 63     Cerebrovascular Disease Paternal Uncle      Diabetes No family hx of      Hypertension No family hx of              Review of Systems:     A complete 10 point review of systems was negative other than HPI or as commented below.         Physical Examination:   /80   Pulse 69   Resp 16   Ht 1.721 m (5' 7.75\")   Wt 90.7 kg (200 lb)   SpO2 95%   BMI 30.63 kg/m     Neck Circumference: 44 cm  Constitutional:  Awake, alert, cooperative, in no apparent distress  Eyes: No icterus  ENT: Mallampati Class: III.  Nose: Mucus and erythema noted.  Inferior nasal turbinates are edematous.  Tongue: Scalloped, large tongue base.  Cardiovascular: Regular S1 and S2, no gallops or murmurs  Neck: Supple, no thyroid enlargement  Pulmonary:  Chest symmetric, lungs clear bilaterally and no crackles, wheezes or rales  Extremities:  No pretibial edema  Skin:  No rash or significant lesions visible  Gait:  Normal  Neurologic: Alert, oriented, no focal neurological deficit  Psychological: euthymic; affect appropriate            Data: All pertinent previous laboratory data reviewed     No results found for: PH, PHARTERIAL, PO2, FY3DHDLQISG, SAT, PCO2, HCO3, BASEEXCESS, RACIEL, BEB  Lab Results   Component Value Date    TSH 1.59 08/07/2015 "     Lab Results   Component Value Date     (H) 11/07/2018    GLC 99 07/03/2014     Lab Results   Component Value Date    HGB 15.1 08/07/2015    HGB 15.4 07/03/2014     Lab Results   Component Value Date    BUN 15 11/07/2018    BUN 14 07/03/2014    CR 1.26 11/07/2018    CR 1.13 07/03/2014     Lab Results   Component Value Date    AST 36 07/03/2014    ALT 26 11/07/2018    ALT 18 07/28/2017    ALKPHOS 91 07/03/2014    BILITOTAL 0.9 07/03/2014     No results found for: UAMP, UBARB, BENZODIAZEUR, UCANN, UCOC, OPIT, UPCP    Britni Berg MD   12/19/2019   [address], [address] [id number] [phone]    Copy to: Tha Ortega

## 2019-12-19 NOTE — NURSING NOTE
"Chief Complaint   Patient presents with     Consult     Snoring, interrupting wife's skeep. Waking up tired       Initial /80   Pulse 69   Resp 16   Ht 1.721 m (5' 7.75\")   Wt 90.7 kg (200 lb)   SpO2 95%   BMI 30.63 kg/m   Estimated body mass index is 30.63 kg/m  as calculated from the following:    Height as of this encounter: 1.721 m (5' 7.75\").    Weight as of this encounter: 90.7 kg (200 lb).    Medication Reconciliation: complete    Neck circumference: 17.5 inches / 44 centimeters.    ESS 10    Clary Nunez MA      "

## 2020-01-01 NOTE — PROGRESS NOTES
Discharge instructions reviewed and signed with mother of baby. Mother of baby macey understanding. Following up with pediatrician on Monday at 1:30 (8/31). Security tag removed and bands verified. Infant left in car seat from unit with parents. Discharge summary faxed to follow-up provider, TORRES Sanford MD. 
 
 Pt called back and said he has had a low level dry cough since he started cardiac meds years ago, but it has worsened recently. Pt does take lisinopril, but has been on it for years. Also, pt wants to know if he can have a calcium scan of his heart to look for progression of his CAD. I told pt he already has known CAD so a calcium score is not something  would order to monitor for progression of known CAD. Pt has had stress echos in the past and last one was 2014. Pt denied any issues with shortness of breath or chest discomfort, but wants another stress echo if possible for his peace of mind. Pt has OV with  in November. Will send update to  and call pt back with his thoughts. Katey CROWLEY September 20, 2018, 4:12 PM

## 2020-01-13 ENCOUNTER — OFFICE VISIT (OUTPATIENT)
Dept: CARDIOLOGY | Facility: CLINIC | Age: 60
End: 2020-01-13
Payer: COMMERCIAL

## 2020-01-13 VITALS
SYSTOLIC BLOOD PRESSURE: 118 MMHG | DIASTOLIC BLOOD PRESSURE: 74 MMHG | WEIGHT: 200.9 LBS | BODY MASS INDEX: 30.45 KG/M2 | HEIGHT: 68 IN | HEART RATE: 75 BPM

## 2020-01-13 DIAGNOSIS — Z13.1 SCREENING FOR DIABETES MELLITUS: ICD-10-CM

## 2020-01-13 DIAGNOSIS — E78.5 HYPERLIPIDEMIA LDL GOAL <70: ICD-10-CM

## 2020-01-13 DIAGNOSIS — I25.10 CORONARY ARTERY DISEASE INVOLVING NATIVE CORONARY ARTERY OF NATIVE HEART WITHOUT ANGINA PECTORIS: Primary | ICD-10-CM

## 2020-01-13 LAB
ALT SERPL W P-5'-P-CCNC: 18 U/L (ref 5–30)
ANION GAP SERPL CALCULATED.3IONS-SCNC: 13.1 MMOL/L (ref 6–17)
BUN SERPL-MCNC: 15 MG/DL (ref 7–30)
CALCIUM SERPL-MCNC: 9.8 MG/DL (ref 8.5–10.5)
CHLORIDE SERPL-SCNC: 108 MMOL/L (ref 98–107)
CHOLEST SERPL-MCNC: 149 MG/DL
CO2 SERPL-SCNC: 22 MMOL/L (ref 23–29)
CREAT SERPL-MCNC: 1.06 MG/DL (ref 0.7–1.3)
GFR SERPL CREATININE-BSD FRML MDRD: 71 ML/MIN/{1.73_M2}
GLUCOSE SERPL-MCNC: 106 MG/DL (ref 70–105)
HDLC SERPL-MCNC: 45 MG/DL
LDLC SERPL CALC-MCNC: 75 MG/DL
NONHDLC SERPL-MCNC: 104 MG/DL
POTASSIUM SERPL-SCNC: 4.1 MMOL/L (ref 3.5–5.1)
SODIUM SERPL-SCNC: 139 MMOL/L (ref 136–145)
TRIGL SERPL-MCNC: 147 MG/DL

## 2020-01-13 PROCEDURE — 99213 OFFICE O/P EST LOW 20 MIN: CPT | Performed by: INTERNAL MEDICINE

## 2020-01-13 PROCEDURE — 84460 ALANINE AMINO (ALT) (SGPT): CPT | Performed by: INTERNAL MEDICINE

## 2020-01-13 PROCEDURE — 80048 BASIC METABOLIC PNL TOTAL CA: CPT | Performed by: INTERNAL MEDICINE

## 2020-01-13 PROCEDURE — 36415 COLL VENOUS BLD VENIPUNCTURE: CPT | Performed by: INTERNAL MEDICINE

## 2020-01-13 PROCEDURE — 80061 LIPID PANEL: CPT | Performed by: INTERNAL MEDICINE

## 2020-01-13 ASSESSMENT — MIFFLIN-ST. JEOR: SCORE: 1686.81

## 2020-01-13 NOTE — PROGRESS NOTES
HPI and Plan:   See dictation    Orders Placed This Encounter   Procedures     Basic metabolic panel     Lipid Profile     ALT     Follow-Up with Cardiologist       No orders of the defined types were placed in this encounter.      There are no discontinued medications.      Encounter Diagnoses   Name Primary?     Coronary artery disease involving native coronary artery of native heart without angina pectoris Yes     Hyperlipidemia LDL goal <70        CURRENT MEDICATIONS:  Current Outpatient Medications   Medication Sig Dispense Refill     aspirin 81 MG tablet Take  by mouth daily.       buPROPion (WELLBUTRIN XL) 150 MG 24 hr tablet Take 1 tablet (150 mg) by mouth every morning 90 tablet 3     cetirizine (ZYRTEC) 10 MG tablet        citalopram (CELEXA) 20 MG tablet Take 1 tablet (20 mg) by mouth daily 90 tablet 3     Dupilumab (DUPIXENT) 300 MG/2ML syringe Inject 300 mg Subcutaneous as needed Every other week as needed       ezetimibe (ZETIA) 10 MG tablet Take 1 tablet (10 mg) by mouth daily 90 tablet 3     famotidine (PEPCID) 20 MG tablet Take 20 mg by mouth 2 times daily       fluticasone (FLONASE) 50 MCG/ACT spray Spray 2 sprays into both nostrils daily 1 Bottle 11     ibuprofen (ADVIL/MOTRIN) 100 MG tablet Take 100 mg by mouth as needed       rosuvastatin (CRESTOR) 20 MG tablet Take 1 tablet (20 mg) by mouth daily 90 tablet 3     ranitidine (ZANTAC) 150 MG tablet Take 150 mg by mouth         ALLERGIES     Allergies   Allergen Reactions     Atorvastatin      Muscle aches     Questran [Cholestyramine]      Nausea       PAST MEDICAL HISTORY:  Past Medical History:   Diagnosis Date     Acute duodenal ulcer with bleeding 1980s     Coronary artery disease      Eczema      Hyperlipidemia LDL goal < 70     intolerant to high dose statins     Hypertension      Mild major depression (H)        PAST SURGICAL HISTORY:  Past Surgical History:   Procedure Laterality Date     ANGIOPLASTY  08/2010    LAD PTCA/JIA      HERNIORRHAPHY INGUINAL      right, x2       FAMILY HISTORY:  Family History   Problem Relation Age of Onset     Heart Disease Father         MI approx age 63     Cerebrovascular Disease Paternal Uncle      Diabetes No family hx of      Hypertension No family hx of        SOCIAL HISTORY:  Social History     Socioeconomic History     Marital status:      Spouse name: None     Number of children: None     Years of education: None     Highest education level: None   Occupational History     Occupation: [employer]     Comment: [employer]    Social Needs     Financial resource strain: None     Food insecurity:     Worry: None     Inability: None     Transportation needs:     Medical: None     Non-medical: None   Tobacco Use     Smoking status: Never Smoker     Smokeless tobacco: Never Used   Substance and Sexual Activity     Alcohol use: Yes     Alcohol/week: 2.0 standard drinks     Types: 2 Glasses of wine per week     Frequency: 4 or more times a week     Drinks per session: 1 or 2     Comment: 2 glasses of wine most nights     Drug use: No     Sexual activity: Yes     Partners: Female   Lifestyle     Physical activity:     Days per week: None     Minutes per session: None     Stress: None   Relationships     Social connections:     Talks on phone: None     Gets together: None     Attends Zoroastrianism service: None     Active member of club or organization: None     Attends meetings of clubs or organizations: None     Relationship status: None     Intimate partner violence:     Fear of current or ex partner: None     Emotionally abused: None     Physically abused: None     Forced sexual activity: None   Other Topics Concern      Service Not Asked     Blood Transfusions Not Asked     Caffeine Concern No     Occupational Exposure Not Asked     Hobby Hazards Not Asked     Sleep Concern Yes     Comment: snoring     Stress Concern Not Asked     Weight Concern No     Special Diet No     Back  "Care Not Asked     Exercise Yes     Comment: Active     Bike Helmet Not Asked     Seat Belt Yes     Self-Exams Not Asked     Parent/sibling w/ CABG, MI or angioplasty before 65F 55M? No   Social History Narrative     None       Review of Systems:  Skin:  Negative       Eyes:  Positive for glasses(reading)    ENT:  Negative      Respiratory:  Negative       Cardiovascular:  Negative Positive for    Gastroenterology: Positive for heartburn;reflux    Genitourinary:  Negative      Musculoskeletal:  Negative      Neurologic:  Negative      Psychiatric:  Positive for depression    Heme/Lymph/Imm:  Positive for allergies rx allergies  Endocrine:  Negative        Physical Exam:  Vitals: /74   Pulse 75   Ht 1.721 m (5' 7.75\")   Wt 91.1 kg (200 lb 14.4 oz)   BMI 30.77 kg/m      Constitutional:  cooperative, alert and oriented, well developed, well nourished, in no acute distress        Skin:  warm and dry to the touch, no apparent skin lesions or masses noted          Head:  normocephalic, no masses or lesions        Eyes:  pupils equal and round, conjunctivae and lids unremarkable, sclera white, no xanthalasma, EOMS intact, no nystagmus        Lymph:      ENT:  no pallor or cyanosis, dentition good        Neck:  carotid pulses are full and equal bilaterally, JVP normal, no carotid bruit        Respiratory:  normal breath sounds, clear to auscultation, normal A-P diameter, normal symmetry, normal respiratory excursion, no use of accessory muscles         Cardiac: regular rhythm, normal S1/S2, no S3 or S4, apical impulse not displaced, no murmurs, gallops or rubs                pulses full and equal, no bruits auscultated                                        GI:  abdomen soft;BS normoactive        Extremities and Muscular Skeletal:  no deformities, clubbing, cyanosis, erythema observed;no edema              Neurological:  no gross motor deficits;affect appropriate        Psych:  Alert and Oriented x 3  "       CC  Tha Ortega MD  [address]   [address]  [place] [address]

## 2020-01-13 NOTE — LETTER
1/13/2020    Tha Ortega MD  [address]  [facility] [address]    RE: Hermelindo Duffy       Dear Colleague,    I had the pleasure of seeing Hermelindo Duffy in the [facility].    HPI and Plan:   See dictation    Orders Placed This Encounter   Procedures     Basic metabolic panel     Lipid Profile     ALT     Follow-Up with Cardiologist       No orders of the defined types were placed in this encounter.      There are no discontinued medications.      Encounter Diagnoses   Name Primary?     Coronary artery disease involving native coronary artery of native heart without angina pectoris Yes     Hyperlipidemia LDL goal <70        CURRENT MEDICATIONS:  Current Outpatient Medications   Medication Sig Dispense Refill     aspirin 81 MG tablet Take  by mouth daily.       buPROPion (WELLBUTRIN XL) 150 MG 24 hr tablet Take 1 tablet (150 mg) by mouth every morning 90 tablet 3     cetirizine (ZYRTEC) 10 MG tablet        citalopram (CELEXA) 20 MG tablet Take 1 tablet (20 mg) by mouth daily 90 tablet 3     Dupilumab (DUPIXENT) 300 MG/2ML syringe Inject 300 mg Subcutaneous as needed Every other week as needed       ezetimibe (ZETIA) 10 MG tablet Take 1 tablet (10 mg) by mouth daily 90 tablet 3     famotidine (PEPCID) 20 MG tablet Take 20 mg by mouth 2 times daily       fluticasone (FLONASE) 50 MCG/ACT spray Spray 2 sprays into both nostrils daily 1 Bottle 11     ibuprofen (ADVIL/MOTRIN) 100 MG tablet Take 100 mg by mouth as needed       rosuvastatin (CRESTOR) 20 MG tablet Take 1 tablet (20 mg) by mouth daily 90 tablet 3     ranitidine (ZANTAC) 150 MG tablet Take 150 mg by mouth         ALLERGIES     Allergies   Allergen Reactions     Atorvastatin      Muscle aches     Questran [Cholestyramine]      Nausea       PAST MEDICAL HISTORY:  Past Medical History:   Diagnosis Date     Acute duodenal ulcer with bleeding 1980s     Coronary artery disease      Eczema       Hyperlipidemia LDL goal < 70     intolerant to high dose statins     Hypertension      Mild major depression (H)        PAST SURGICAL HISTORY:  Past Surgical History:   Procedure Laterality Date     ANGIOPLASTY  08/2010    LAD PTCA/JIA     HERNIORRHAPHY INGUINAL      right, x2       FAMILY HISTORY:  Family History   Problem Relation Age of Onset     Heart Disease Father         MI approx age 63     Cerebrovascular Disease Paternal Uncle      Diabetes No family hx of      Hypertension No family hx of        SOCIAL HISTORY:  Social History     Socioeconomic History     Marital status:      Spouse name: None     Number of children: None     Years of education: None     Highest education level: None   Occupational History     Occupation: [employer]     Comment: [employer]    Social Needs     Financial resource strain: None     Food insecurity:     Worry: None     Inability: None     Transportation needs:     Medical: None     Non-medical: None   Tobacco Use     Smoking status: Never Smoker     Smokeless tobacco: Never Used   Substance and Sexual Activity     Alcohol use: Yes     Alcohol/week: 2.0 standard drinks     Types: 2 Glasses of wine per week     Frequency: 4 or more times a week     Drinks per session: 1 or 2     Comment: 2 glasses of wine most nights     Drug use: No     Sexual activity: Yes     Partners: Female   Lifestyle     Physical activity:     Days per week: None     Minutes per session: None     Stress: None   Relationships     Social connections:     Talks on phone: None     Gets together: None     Attends Protestant service: None     Active member of club or organization: None     Attends meetings of clubs or organizations: None     Relationship status: None     Intimate partner violence:     Fear of current or ex partner: None     Emotionally abused: None     Physically abused: None     Forced sexual activity: None   Other Topics Concern      Service Not Asked      "Blood Transfusions Not Asked     Caffeine Concern No     Occupational Exposure Not Asked     Hobby Hazards Not Asked     Sleep Concern Yes     Comment: snoring     Stress Concern Not Asked     Weight Concern No     Special Diet No     Back Care Not Asked     Exercise Yes     Comment: Active     Bike Helmet Not Asked     Seat Belt Yes     Self-Exams Not Asked     Parent/sibling w/ CABG, MI or angioplasty before 65F 55M? No   Social History Narrative     None       Review of Systems:  Skin:  Negative       Eyes:  Positive for glasses(reading)    ENT:  Negative      Respiratory:  Negative       Cardiovascular:  Negative Positive for    Gastroenterology: Positive for heartburn;reflux    Genitourinary:  Negative      Musculoskeletal:  Negative      Neurologic:  Negative      Psychiatric:  Positive for depression    Heme/Lymph/Imm:  Positive for allergies rx allergies  Endocrine:  Negative        Physical Exam:  Vitals: /74   Pulse 75   Ht 1.721 m (5' 7.75\")   Wt 91.1 kg (200 lb 14.4 oz)   BMI 30.77 kg/m       Constitutional:  cooperative, alert and oriented, well developed, well nourished, in no acute distress        Skin:  warm and dry to the touch, no apparent skin lesions or masses noted          Head:  normocephalic, no masses or lesions        Eyes:  pupils equal and round, conjunctivae and lids unremarkable, sclera white, no xanthalasma, EOMS intact, no nystagmus        Lymph:      ENT:  no pallor or cyanosis, dentition good        Neck:  carotid pulses are full and equal bilaterally, JVP normal, no carotid bruit        Respiratory:  normal breath sounds, clear to auscultation, normal A-P diameter, normal symmetry, normal respiratory excursion, no use of accessory muscles         Cardiac: regular rhythm, normal S1/S2, no S3 or S4, apical impulse not displaced, no murmurs, gallops or rubs                pulses full and equal, no bruits auscultated                                        GI:  abdomen soft;BS " normoactive        Extremities and Muscular Skeletal:  no deformities, clubbing, cyanosis, erythema observed;no edema              Neurological:  no gross motor deficits;affect appropriate        Psych:  Alert and Oriented x 3        CC  Tha Ortega MD  [address] [place] [address] [address]  [place] [address]                Thank you for allowing me to participate in the care of your patient.      Sincerely,     ASHTYN VELEZ MD     [facility]    cc:   Tha Ortega MD  [address] [place] [address] [address]  [place] [address]

## 2020-01-13 NOTE — LETTER
1/13/2020      Tha Ortega MD  [address]  [facility] [place] [facility] [address]      RE: Hermelindo VIDAL Clive       Dear Colleague,    I had the pleasure of seeing Hermelindo Duffy in the [facility].    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        He has a history of stenting of the LAD in 2010 and has done well since then without recurrent coronary artery disease issues.  He has a history of statin intolerance, primarily with atorvastatin, due to muscle pains.  He has been able to tolerate rosuvastatin at a dose up to 20 mg per day, but this was not adequately controlling his cholesterol numbers and we added Zetia a few years ago.  His LDL has been slightly above goal since then but reasonable and I believe he is on the maximum tolerated dose of cholesterol medications.  His current cholesterol this year is improved over the last year.  His LDL came down from 89 to 75.  His HDL is 45, triglycerides 147.  His basic metabolic panel is normal.      PHYSICAL EXAMINATION:  Today, his blood pressure is 118/74 without blood pressure lowering medications.  Heart rate 75 and weight 200 pounds.  His lungs are clear.  Heart rhythm is regular.  He has no cardiac murmurs or carotid bruits.      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  He is on rosuvastatin 20 mg a day plus Zetia and his cholesterol numbers are satisfactory.  He had been on some medication to lower his blood pressure in the past with lisinopril, but developed lightheadedness and that was stopped.  His blood pressures have remained excellent.  He continues on aspirin  as well.  He is going to do a sleep study in the near future.      I am pleased that he is doing well and his risk factors are under good control.  I will not make any changes.  I will plan to see him back again in 1 year.      cc:   Tha Ortega MD    [facility]   [address] [place] [address]         ASHTYN VELEZ MD, [facility]             D: 2020   T: 2020   MT:       Name:     HUBERT MIMS   MRN:      [id number]        Account:      [id number]   :      1960           Service Date: 2020      Document: [id number]         Outpatient Encounter Medications as of 2020   Medication Sig Dispense Refill     aspirin 81 MG tablet Take  by mouth daily.       buPROPion (WELLBUTRIN XL) 150 MG 24 hr tablet Take 1 tablet (150 mg) by mouth every morning 90 tablet 3     cetirizine (ZYRTEC) 10 MG tablet        citalopram (CELEXA) 20 MG tablet Take 1 tablet (20 mg) by mouth daily 90 tablet 3     Dupilumab (DUPIXENT) 300 MG/2ML syringe Inject 300 mg Subcutaneous as needed Every other week as needed       ezetimibe (ZETIA) 10 MG tablet Take 1 tablet (10 mg) by mouth daily 90 tablet 3     famotidine (PEPCID) 20 MG tablet Take 20 mg by mouth 2 times daily       fluticasone (FLONASE) 50 MCG/ACT spray Spray 2 sprays into both nostrils daily 1 Bottle 11     ibuprofen (ADVIL/MOTRIN) 100 MG tablet Take 100 mg by mouth as needed       rosuvastatin (CRESTOR) 20 MG tablet Take 1 tablet (20 mg) by mouth daily 90 tablet 3     ranitidine (ZANTAC) 150 MG tablet Take 150 mg by mouth       No facility-administered encounter medications on file as of 2020.        Again, thank you for allowing me to participate in the care of your patient.      Sincerely,    ASHTYN VELEZ MD     [facility]

## 2020-01-13 NOTE — PROGRESS NOTES
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        He has a history of stenting of the LAD in 2010 and has done well since then without recurrent coronary artery disease issues.  He has a history of statin intolerance, primarily with atorvastatin, due to muscle pains.  He has been able to tolerate rosuvastatin at a dose up to 20 mg per day, but this was not adequately controlling his cholesterol numbers and we added Zetia a few years ago.  His LDL has been slightly above goal since then but reasonable and I believe he is on the maximum tolerated dose of cholesterol medications.  His current cholesterol this year is improved over the last year.  His LDL came down from 89 to 75.  His HDL is 45, triglycerides 147.  His basic metabolic panel is normal.      PHYSICAL EXAMINATION:  Today, his blood pressure is 118/74 without blood pressure lowering medications.  Heart rate 75 and weight 200 pounds.  His lungs are clear.  Heart rhythm is regular.  He has no cardiac murmurs or carotid bruits.      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  He is on rosuvastatin 20 mg a day plus Zetia and his cholesterol numbers are satisfactory.  He had been on some medication to lower his blood pressure in the past with lisinopril, but developed lightheadedness and that was stopped.  His blood pressures have remained excellent.  He continues on aspirin as well.  He is going to do a sleep study in the near future.      I am pleased that he is doing well and his risk factors are under good control.  I will not make any changes.  I will plan to see him back again in 1 year.      cc:   Tha  MD Shannon    [facility]   [address] [place] [address]         ASHTYN VELEZ MD, FACC             D: 2020   T: 2020   MT: DARNELL      Name:     HUBERT MIMS   MRN:      [id number]        Account:      [id number]   :      1960           Service Date: 2020      Document: [id number]

## 2020-01-16 ENCOUNTER — OFFICE VISIT (OUTPATIENT)
Dept: SLEEP MEDICINE | Facility: CLINIC | Age: 60
End: 2020-01-16
Attending: PEDIATRICS
Payer: COMMERCIAL

## 2020-01-16 DIAGNOSIS — G47.33 OSA (OBSTRUCTIVE SLEEP APNEA): ICD-10-CM

## 2020-01-16 PROCEDURE — G0399 HOME SLEEP TEST/TYPE 3 PORTA: HCPCS

## 2020-01-17 ENCOUNTER — DOCUMENTATION ONLY (OUTPATIENT)
Dept: SLEEP MEDICINE | Facility: CLINIC | Age: 60
End: 2020-01-17

## 2020-01-17 NOTE — PROGRESS NOTES
This HSAT was performed using a Noxturnal T3 device which recorded snore, sound, movement activity, body position, nasal pressure, oronasal thermal airflow, pulse, oximetry and both chest and abdominal respiratory effort. HSAT data was restricted to the time patient states they were in bed.     HSAT was scored using 1B 4% hypopnea rule.     AHI: 23.5. Snoring was reported as loud.  Time with SpO2 below 89% was 17.2 minutes.   Overall signal quality was good     Pt will follow up with sleep provider to determine appropriate therapy.     Ordering Otis TSE Audrey Wells, MD Charles O. BA, [facility], RST System Clinical Specialist 01/17/2020

## 2020-01-23 NOTE — PROCEDURES
"HOME SLEEP STUDY INTERPRETATION    Patient: Hermelindo Duffy  MRN: [id number]  YOB: 1960  Study Date: 2020  Referring Provider: Tha Ortega  Ordering Provider: Britni Berg MD     Indications for Home Study: Hermelindo Duffy is a 59 year old male; he reports snoring that has been present for several years and is disruptive to his wife's sleep.  She does not witness apneic events.  He experiences excessive daytime sleepiness which has worsened over time. His score on the [place] Sleepiness Scale is 10 out of 24.  The patient takes caffeine but not more than 3 drinks per day.  Bedtime is typically 1 AM and wake up time 9 AM.  He may change his sleep schedule based on the demands of his job duties as a .  Sleep latency is 2 minutes.  He may awaken due to uncomfortable position or breathing through his mouth.  He endorses chronic nasal congestion and symptoms of postnasal drip.  He has tried fluticasone nasal spray in the past which has been intermittently helpful.  On occasion he is experienced an unusual feeling in his legs.  However, the sensation has not been problematic recently.   Medical history includes coronary artery stent placement in  following episodes of angina.      Estimated body mass index is 30.77 kg/m  as calculated from the following:    Height as of 20: 1.721 m (5' 7.75\").    Weight as of 20: 91.1 kg (200 lb 14.4 oz).  Total score - [place]: 10 (2019 10:26 AM)  STOP-BAN/8    Data: A full night home sleep study was performed recording the standard physiologic parameters including body position, movement, sound, nasal pressure, thermal oral airflow, chest and abdominal movements with respiratory inductance plethysmography, and oxygen saturation by pulse oximetry. Pulse rate was estimated by oximetry recording. This study was considered adequate based on > 4 hours of quality oximetry and respiratory recording. As specified by the AASM " Manual for the Scoring of Sleep and Associated events, version 2.3, Rule VIII.D 1B, 4% oxygen desaturation scoring for hypopneas is used as a standard of care on all home sleep apnea testing.    Analysis Time:  419 minutes    Respiration:   Sleep Associated Hypoxemia: hypoxemia was present. Baseline oxygen saturation was 92.7%.  Time with saturation less than or equal to 88% was 17.2 minutes. The lowest oxygen saturation was 83%.   Snoring: Snoring was present.  Respiratory events: The home study revealed a presence of 86 obstructive apneas and 0 mixed and central apneas. There were 78 hypopneas resulting in a combined apnea/hypopnea index [AHI] of 23.5 events per hour.  AHI was 62.1 per hour supine, 4.4 per hour on left side, and 10.6 per hour on right side.   Pattern: Excluding events noted above, respiratory rate and pattern was Normal.    Position: Percent of time spent: supine - 29.7%, prone - 0.0%, on left - 39.1%, on right - 31.2%.    Heart Rate: By pulse oximetry normal rate was noted.     Assessment:   Moderate obstructive sleep apnea, more prominent in the supine position.  Sleep associated hypoxemia was present.    Recommendations:  Consider auto-CPAP at 5-15 cmH2O.  Suggest optimizing sleep hygiene and avoiding sleep deprivation.  Weight management.    Diagnosis Code(s): Obstructive Sleep Apnea G47.33, Hypoxemia G47.36    Britni Berg MD, January 23, 2020   Diplomate, American Board of Pediatrics, Sleep Medicine

## 2020-01-30 ENCOUNTER — OFFICE VISIT (OUTPATIENT)
Dept: SLEEP MEDICINE | Facility: CLINIC | Age: 60
End: 2020-01-30
Payer: COMMERCIAL

## 2020-01-30 VITALS
WEIGHT: 198 LBS | BODY MASS INDEX: 30.01 KG/M2 | HEART RATE: 79 BPM | SYSTOLIC BLOOD PRESSURE: 120 MMHG | RESPIRATION RATE: 16 BRPM | HEIGHT: 68 IN | DIASTOLIC BLOOD PRESSURE: 79 MMHG

## 2020-01-30 DIAGNOSIS — G47.33 OSA (OBSTRUCTIVE SLEEP APNEA): Primary | ICD-10-CM

## 2020-01-30 PROCEDURE — 99213 OFFICE O/P EST LOW 20 MIN: CPT | Performed by: PEDIATRICS

## 2020-01-30 ASSESSMENT — MIFFLIN-ST. JEOR: SCORE: 1673.75

## 2020-01-30 NOTE — PROGRESS NOTES
[facility]  Outpatient Sleep Medicine Encounter, Established Patient      Name: Hermelindo Duffy MRN# [id number]   Age: 59 year old YOB: 1960     Date of Visit: January 30, 2020  Primary care provider: Tha Ortega         Assessment and Plan:     1. KIMBERLY (obstructive sleep apnea)  Patient has moderate obstructive sleep apnea with con commitment nocturnal hypoxemia related to episodic desaturations, particularly during supine sleep.  I recommended CPAP therapy program to deliver pressures between 5 and 15 cm water.  Patient is agreeable and would like to proceed.  I placed an order today and he will be closely followed by the sleep therapy management group.  I expect that CPAP treatment will resolve nocturnal hypoxemia.         Chief Complaint:   Discuss sleep study results         History of Present Illness:   Hermelindo Duffy is a 59 year old male who is here to follow-up on his home sleep apnea test results.  The test was conducted on January 16, 2020.  Adequate data was obtained for interpretation.  Hypoxia was present with 17 minutes at or below SPO2 of 88%.  Desaturations were more prominent during supine sleep.  Desaturations were almost entirely episodic following apneas or hypopneas.  The oxyhemoglobin saturation ludwig was 83%.  The overall respiratory events index was 23.5 events per hour.  The RDI was 62/h of supine sleep.    We discussed the study in detail and questions were answered.           Medications:     Current Outpatient Medications   Medication Sig     aspirin 81 MG tablet Take  by mouth daily.     buPROPion (WELLBUTRIN XL) 150 MG 24 hr tablet Take 1 tablet (150 mg) by mouth every morning     cetirizine (ZYRTEC) 10 MG tablet      citalopram (CELEXA) 20 MG tablet Take 1 tablet (20 mg) by mouth daily     Dupilumab (DUPIXENT) 300 MG/2ML syringe Inject 300 mg Subcutaneous as needed Every other week as needed     ezetimibe (ZETIA) 10 MG  "tablet Take 1 tablet (10 mg) by mouth daily     famotidine (PEPCID) 20 MG tablet Take 20 mg by mouth 2 times daily     fluticasone (FLONASE) 50 MCG/ACT spray Spray 2 sprays into both nostrils daily     ibuprofen (ADVIL/MOTRIN) 100 MG tablet Take 100 mg by mouth as needed     rosuvastatin (CRESTOR) 20 MG tablet Take 1 tablet (20 mg) by mouth daily     No current facility-administered medications for this visit.         Allergies   Allergen Reactions     Atorvastatin      Muscle aches     Questran [Cholestyramine]      Nausea            Review of Systems:   Review of systems was negative other than HPI or as commented below.         Past Medical History:     Past Medical History:   Diagnosis Date     Acute duodenal ulcer with bleeding 1980s     Coronary artery disease      Eczema      Hyperlipidemia LDL goal < 70     intolerant to high dose statins     Hypertension      Mild major depression (H)              Past Surgical History:      Past Surgical History:   Procedure Laterality Date     ANGIOPLASTY  08/2010    LAD PTCA/JIA     HERNIORRHAPHY INGUINAL      right, x2            Physical Examination:   /79   Pulse 79   Resp 16   Ht 1.721 m (5' 7.76\")   Wt 89.8 kg (198 lb)   BMI 30.32 kg/m     Constitutional: . Awake, alert, cooperative, in no apparent distress  Neurologic: Alert, oriented, no focal neurological deficit observed  Psychological: Euthymic; affect appropriate  Eyes: No icterus  Gait: Normal      Britni Berg MD   1/30/2020   [facility] [address], [address]  [place] [address] [phone]    Copy to: Tha Ortega"

## 2020-02-03 ENCOUNTER — TELEPHONE (OUTPATIENT)
Dept: SLEEP MEDICINE | Facility: CLINIC | Age: 60
End: 2020-02-03

## 2020-02-04 ENCOUNTER — TELEPHONE (OUTPATIENT)
Dept: SLEEP MEDICINE | Facility: CLINIC | Age: 60
End: 2020-02-04

## 2020-02-13 ENCOUNTER — DOCUMENTATION ONLY (OUTPATIENT)
Dept: SLEEP MEDICINE | Facility: CLINIC | Age: 60
End: 2020-02-13

## 2020-02-14 NOTE — PROGRESS NOTES
Patient was offered choice of vendor and chose [facility].  Patient Hermelindo VIDAL Clive was set up at [place] on February 14, 2020. Patient received a [employer]med AirSense 10 Auto. Pressures were set at 5-15 cm H2O.   Patient s ramp is 5 cm H2O for Auto and FLEX/EPR is EPR, 2.  Patient received a [employer] Mask name: [id number]  Nasal mask size Medium, heated tubing and heated humidifier.  Patient does not need to meet compliance.   Kasey Joy

## 2020-02-17 ENCOUNTER — DOCUMENTATION ONLY (OUTPATIENT)
Dept: SLEEP MEDICINE | Facility: CLINIC | Age: 60
End: 2020-02-17

## 2020-02-17 NOTE — PROGRESS NOTES
Patient returned call.    Subjective measures:   Patient is reporting issues with congestion using the nasal mask.  He has chronic nasal congestion.       Assessment: Pt not meeting objective benchmarks for compliance  Patient failing following subjective benchmarks: congestion    Action plan:pt to have 14 day [facility] visit. Pt will increase humidity and start using a nasal saline spray for congestion.  If this does not resolve he will reach out to [facility] .          Total time spent counseling, coaching  and reviewing PAP therapy data with patient  5 minutes     [id number] no (this call)    [id number] no (previous call)

## 2020-02-17 NOTE — PROGRESS NOTES
3 DAY STM VISIT    Diagnostic AHI:   23.5  HST    Patient contacted for 3 day STM visit    Confirmed with patient at time of call- N/A Patient is still interested in STM service     Message left for patient to return call    Replacement device: No  STM ordered by provider: Yes     Device type: Auto-CPAP  PAP settings from order::  CPAP min 5 cm  H20       CPAP max 15 cm  H20   Mask type:    Nasal Mask     Device settings from machine      Min CPAP 5.0            Max CPAP 15.0           Assessment: Nightly usage, most nights under four hours.  Action plan: Patient to have 14 day STM visit. Patient has a follow up visit scheduled:   not required by insurance.     Total time spent on accessing and  interpreting remote patient PAP therapy data  10 minutes  Total time spent counseling, coaching  and reviewing PAP therapy data with patient  0 minutes  [id number] no

## 2020-02-18 DIAGNOSIS — E78.00 HYPERCHOLESTEROLEMIA: ICD-10-CM

## 2020-02-18 DIAGNOSIS — F33.0 MAJOR DEPRESSIVE DISORDER, RECURRENT EPISODE, MILD (H): ICD-10-CM

## 2020-02-18 RX ORDER — ROSUVASTATIN CALCIUM 20 MG/1
20 TABLET, COATED ORAL DAILY
Qty: 90 TABLET | Refills: 2 | Status: SHIPPED | OUTPATIENT
Start: 2020-02-18 | End: 2020-12-30

## 2020-02-28 ENCOUNTER — DOCUMENTATION ONLY (OUTPATIENT)
Dept: SLEEP MEDICINE | Facility: CLINIC | Age: 60
End: 2020-02-28

## 2020-02-28 NOTE — PROGRESS NOTES
14  DAY STM VISIT    Diagnostic AHI:   23.5  HST    Data only recheck patient is unavailable until March 8 per voicemail.     Assessment: Pt not meeting objective benchmarks for compliance       Action plan: pt to have 30 day STM visit.      Device type: Auto-CPAP    PAP settings: CPAP min 5.0 cm  H20       CPAP max 15.0 cm  H20           95th% pressure 11.8 cm  H20     Mask type:  Nasal Mask    Objective measures: 14 day rolling measures      Compliance  28 %      Leak  25.8 lpm  last  upload      AHI 3.18   last  upload      Average number of minutes 190      Objective measure goal  Compliance   Goal >70%  Leak   Goal < 24 lpm  AHI  Goal < 5  Usage  Goal >240        Total time spent on accessing and interpreting remote patient PAP therapy data  10 minutes    Total time spent counseling, coaching  and reviewing PAP therapy data with patient  0 minutes    [id number]  no  [id number]  no (3 day STM)

## 2020-03-06 DIAGNOSIS — I25.10 CORONARY ARTERY DISEASE INVOLVING NATIVE CORONARY ARTERY OF NATIVE HEART WITHOUT ANGINA PECTORIS: ICD-10-CM

## 2020-03-06 DIAGNOSIS — E78.5 HYPERLIPIDEMIA LDL GOAL <70: ICD-10-CM

## 2020-03-06 RX ORDER — EZETIMIBE 10 MG/1
10 TABLET ORAL DAILY
Qty: 90 TABLET | Refills: 3 | Status: SHIPPED | OUTPATIENT
Start: 2020-03-06 | End: 2021-03-11

## 2020-03-20 ENCOUNTER — DOCUMENTATION ONLY (OUTPATIENT)
Dept: SLEEP MEDICINE | Facility: CLINIC | Age: 60
End: 2020-03-20

## 2020-03-20 NOTE — PROGRESS NOTES
30 DAY [facility] VISIT    Diagnostic AHI:   23.5  HST    Message left for patient to return call     Assessment: Pt not meeting objective benchmarks for compliance     Action plan: waiting for patient to return call.   Patient has not scheduled a follow up visit    Device type: Auto-CPAP  PAP settings: CPAP min 5.0 cm  H20     CPAP max 15.0 cm  H20      Mask type:  Nasal Mask  Objective measures: 14 day rolling measures no usage since 2/27/20      Compliance  0 %           Average number of minutes 0      Objective measure goal  Compliance   Goal >70%  Leak   Goal < 24 lpm  AHI  Goal < 5  Usage  Goal >240        Total time spent on accessing and interpreting remote patient PAP therapy data  10 minutes    Total time spent counseling, coaching  and reviewing PAP therapy data with patient  0 minutes     [id number] no this call  [id number] no  at 3 or 14 day [facility]

## 2020-03-24 NOTE — PROGRESS NOTES
Patient returned call.    Subjective measures:   Patient reports that he was sick and then went on vacation.  He was having issues with nasal congestion both during the day and at night.  He will be meeting with an ENT provider.     Assessment: Pt not meeting objective benchmarks for compliance  Patient failing following subjective benchmarks: congestion    Action plan:follow up per provider request      Total time spent counseling, coaching  and reviewing PAP therapy data with patient  12 minutes     [id number] no (this call)    [id number] no (previous call)

## 2020-04-01 ENCOUNTER — VIRTUAL VISIT (OUTPATIENT)
Dept: SLEEP MEDICINE | Facility: CLINIC | Age: 60
End: 2020-04-01
Payer: COMMERCIAL

## 2020-04-01 VITALS — BODY MASS INDEX: 29.55 KG/M2 | HEIGHT: 68 IN | WEIGHT: 195 LBS

## 2020-04-01 DIAGNOSIS — G47.33 OSA ON CPAP: Primary | ICD-10-CM

## 2020-04-01 PROCEDURE — 99214 OFFICE O/P EST MOD 30 MIN: CPT | Mod: TEL | Performed by: INTERNAL MEDICINE

## 2020-04-01 ASSESSMENT — MIFFLIN-ST. JEOR: SCORE: 1660.04

## 2020-04-01 NOTE — PATIENT INSTRUCTIONS
Your BMI is Body mass index is 29.87 kg/m .  Weight management is a personal decision.  If you are interested in exploring weight loss strategies, the following discussion covers the approaches that may be successful. Body mass index (BMI) is one way to tell whether you are at a healthy weight, overweight, or obese. It measures your weight in relation to your height.  A BMI of 18.5 to 24.9 is in the healthy range. A person with a BMI of 25 to 29.9 is considered overweight, and someone with a BMI of 30 or greater is considered obese. More than two-thirds of American adults are considered overweight or obese.  Being overweight or obese increases the risk for further weight gain. Excess weight may lead to heart disease and diabetes.  Creating and following plans for healthy eating and physical activity may help you improve your health.  Weight control is part of healthy lifestyle and includes exercise, emotional health, and healthy eating habits. Careful eating habits lifelong are the mainstay of weight control. Though there are significant health benefits from weight loss, long-term weight loss with diet alone may be very difficult to achieve- studies show long-term success with dietary management in less than 10% of people. Attaining a healthy weight may be especially difficult to achieve in those with severe obesity. In some cases, medications, devices and surgical management might be considered.  What can you do?  If you are overweight or obese and are interested in methods for weight loss, you should discuss this with your provider.     Consider reducing daily calorie intake by 500 calories.     Keep a food journal.     Avoiding skipping meals, consider cutting portions instead.    Diet combined with exercise helps maintain muscle while optimizing fat loss. Strength training is particularly important for building and maintaining muscle mass. Exercise helps reduce stress, increase energy, and improves fitness.  Increasing exercise without diet control, however, may not burn enough calories to loose weight.       Start walking three days a week 10-20 minutes at a time    Work towards walking thirty minutes five days a week     Eventually, increase the speed of your walking for 1-2 minutes at time    In addition, we recommend that you review healthy lifestyles and methods for weight loss available through the National Institutes of Health patient information sites:  http://win.niddk.nih.gov/publications/index.htm    And look into health and wellness programs that may be available through your health insurance provider, employer, local community center, or shagufta club.    Weight management plan: Patient was referred to their PCP to discuss a diet and exercise plan.

## 2020-04-01 NOTE — Clinical Note
Jung Parsk, please provide patient with an alternate mask option-full facemask.  With the nasal congestion and  being mouth breather he is unable to use the nasal mask.  Wondering if he could be mailed a full facemask even though it has been > 30 days since he had the CPAP set up.  Please  call patient and inform if he is able to obtain the different mask.    Jung Armendariz,  Please follow him up through the sleep therapy management program. Plz help in scheduling a telephone visit  in 6 weeks. I also plan to obtain AGATHA once his CPAP compliance improves.    Thank you both,  Asha

## 2020-04-01 NOTE — PROGRESS NOTES
"SLEEP CLINIC FOLLOW UP VISIT:     Date of visit: 4/1/2020    Hermelindo Duffy is a 59 year old male who is being evaluated via a billable telephone visit.      The patient has been notified of following:     \"This telephone visit will be conducted via a call between you and your physician/provider. We have found that certain health care needs can be provided without the need for a physical exam.  This service lets us provide the care you need with a short phone conversation.  If a prescription is necessary we can send it directly to your pharmacy.  If lab work is needed we can place an order for that and you can then stop by our lab to have the test done at a later time.    If during the course of the call the physician/provider feels a telephone visit is not appropriate, you will not be charged for this service.\"     Patient has given verbal consent for Telephone visit?  Yes    Hermelindo Duffy complains of    Chief Complaint   Patient presents with     CPAP Follow Up       I have reviewed and updated the patient's Past Medical History, Social History, Family History and Medication List.    ALLERGIES  Atorvastatin and Questran [cholestyramine]    Additional provider notes:   HPI: Hermelindo Duffy is a 59 yr old male with moderate KIMBERLY( AHI= 23.5 events per hour) and sleep associated hypoxemia diagnosed during HST obtained on 1/16/2020. He was prescribed auto CPAP with pressure settings between 5-15 cm H2O. He was  was set up at [facility] on February 14, 2020 with  a Resmed AirSense 10 Auto with pressures set at 5-15 cm H2O. He  received a [employer] Mask  Eson2  Nasal mask size Medium.  Telephone visit  has been scheduled today for f/u KIMBERLY/review CPAP compliance.    He reports that it has not been going well with the CPAP use.  He has not been able to use the CPAP for more than 4 hours.  Some nights he has been using it for less than 15 minutes.  He attributes it to the nasal congestion. He has been " using a nasal mask.  He is a mouth breather. With nasal congestion he reports difficulty breathing , his mouth pops open, it becomes very uncomfortable and he takes the mask off.   He has history of chronic nasal congestion.  He has used Flonase in the past but reports sporadic use.    According to his wife, he has not been snoring with the CPAP  and  he has been sleeping more soundly without moving  around.  He is unable to notice any difference since his CPAP use has been very erratic.    Sleep scales:  [place] sleepiness score:8/24  Insomnia severity index:8    He denies drowsiness while driving.    CPAP compliance download:  ResMed   Auto-PAP 5.0 - 15.0 cmH2O 30 day usage data:    0% of days with > 4 hours of use. 27/30 days with no use.   Average use 11 minutes per day.   95%ile Leak 24.4 L/min.   CPAP 95% pressure 10.1 cm.   AHI 0.43 events per hour.     Previous sleep study:  HST date: 1/16/2020:  Analysis Time:  419 minutes     Respiration:   Sleep Associated Hypoxemia: hypoxemia was present. Baseline oxygen saturation was 92.7%.  Time with saturation less than or equal to 88% was 17.2 minutes. The lowest oxygen saturation was 83%.   Snoring: Snoring was present.  Respiratory events: The home study revealed a presence of 86 obstructive apneas and 0 mixed and central apneas. There were 78 hypopneas resulting in a combined apnea/hypopnea index [AHI] of 23.5 events per hour.  AHI was 62.1 per hour supine, 4.4 per hour on left side, and 10.6 per hour on right side.   Pattern: Excluding events noted above, respiratory rate and pattern was Normal.     Position: Percent of time spent: supine - 29.7%, prone - 0.0%, on left - 39.1%, on right - 31.2%.     Heart Rate: By pulse oximetry normal rate was noted.      Assessment:   Moderate obstructive sleep apnea, more prominent in the supine position.  Sleep associated hypoxemia was present.     Current meds:  Current Outpatient Medications   Medication Sig Dispense  Refill     aspirin 81 MG tablet Take  by mouth daily.       buPROPion (WELLBUTRIN XL) 150 MG 24 hr tablet Take 1 tablet (150 mg) by mouth every morning 90 tablet 3     cetirizine (ZYRTEC) 10 MG tablet        citalopram (CELEXA) 20 MG tablet Take 1 tablet (20 mg) by mouth daily 90 tablet 3     Dupilumab (DUPIXENT) 300 MG/2ML syringe Inject 300 mg Subcutaneous as needed Every other week as needed       ezetimibe (ZETIA) 10 MG tablet Take 1 tablet (10 mg) by mouth daily 90 tablet 3     famotidine (PEPCID) 20 MG tablet Take 20 mg by mouth 2 times daily       fluticasone (FLONASE) 50 MCG/ACT spray Spray 2 sprays into both nostrils daily 1 Bottle 11     ibuprofen (ADVIL/MOTRIN) 100 MG tablet Take 100 mg by mouth as needed       rosuvastatin (CRESTOR) 20 MG PO tablet Take 1 tablet (20 mg) by mouth daily 90 tablet 2     Problem list:  Patient Active Problem List   Diagnosis     KIMBERLY (obstructive sleep apnea)- mild, no CPAP     Mild major depression (H)     Hyperlipidemia LDL goal <70     Coronary artery disease s/p JIA to LAD 2010     Past medical history, Past surgical history, Allergies, Social history, Family history: reviewed, per EMR    ASSESSMENT/PLAN:  1. Moderate obstructive sleep apnea, predominant during sleep in supine position with sleep associated hypoxemia:  Patient reports poor CPAP compliance due to intolerance that he attributes to nasal congestion.  Mask optimization: will request [facility] to provide patient with different mask option(full face mask) and address the nasal congestion to help improve the CPAP compliance.  Encouraged him to use the CPAP regularly during sleep.    We discussed today the consequences of untreated KIMBERLY.  Will  continue follow up through the sleep therapy management program.    2. Nasal congestion: The nasal congestion has been precluding the CPAP compliance. I have recommended the following:    Increase the heated humidity settings.    Start using flonase nasal spray- 1 spray in each  "nostril once daily  and discontinue after 6 weeks if there is no improvement in symptoms.  Will then consider referral to sleep ENT provider for further evaluation.     Continue using OTC ocean saline nasal spray as needed.    Encouraged him to use the CPAP regularly during sleep.  We discussed today the consequences of untreated KIMBERLY.    Will  continue follow up through the sleep therapy management program.    Plan is to follow-up via telephone visit in 6 weeks and compliance measures will be reviewed.    Once his compliance improves, plan is to obtain an overnight oximetry with the auto CPAP along with parallel compliance download, to check for resolution of the hypoxemia that was observed during the HST.    We discussed weight management with diet and exercise.    Patient was advised to avoid driving or operating heavy machinery if drowsy or sleepy to prevent accidents.      Phone call duration: 22 minutes  Start time:4:00PM  End time:4:22PM    The above note was dictated using voice recognition software. Although reviewed after completion, some word and grammatical error may remain . Please contact the author for any clarifications.    \"I spent a total of 22 minutes with Hermelindo Duffy during today's telephone  visit. Over 50% of this time was spent counseling the patient and  coordinating care regarding sleep-related breathing disorder, CPAP treatment and compliance, going over PAP download/sleep study, nasal congestion\"     Thai Heller MD  [facility] [address] [address] [phone]  Dept: [phone]            "

## 2020-04-02 ENCOUNTER — TELEPHONE (OUTPATIENT)
Dept: SLEEP MEDICINE | Facility: CLINIC | Age: 60
End: 2020-04-02

## 2020-05-20 ENCOUNTER — VIRTUAL VISIT (OUTPATIENT)
Dept: SLEEP MEDICINE | Facility: CLINIC | Age: 60
End: 2020-05-20
Payer: COMMERCIAL

## 2020-05-20 DIAGNOSIS — G47.33 OSA ON CPAP: Primary | ICD-10-CM

## 2020-05-20 PROCEDURE — 99213 OFFICE O/P EST LOW 20 MIN: CPT | Mod: TEL | Performed by: INTERNAL MEDICINE

## 2020-05-20 NOTE — PROGRESS NOTES
"Hermelindo Duffy is a 59 year old male who is being evaluated via a billable telephone visit.      The patient has been notified of following:     \"This telephone visit will be conducted via a call between you and your physician/provider. We have found that certain health care needs can be provided without the need for a physical exam.  This service lets us provide the care you need with a short phone conversation.  If a prescription is necessary we can send it directly to your pharmacy.  If lab work is needed we can place an order for that and you can then stop by our lab to have the test done at a later time.    Telephone visits are billed at different rates depending on your insurance coverage. During this emergency period, for some insurers they may be billed the same as an in-person visit.  Please reach out to your insurance provider with any questions.    If during the course of the call the physician/provider feels a telephone visit is not appropriate, you will not be charged for this service.\"    Patient has given verbal consent for Telephone visit?  Yes    What phone number would you like to be contacted at? [phone]    How would you like to obtain your AVS? [facility]      SLEEP CLINIC FOLLOW UP VISIT    Date of visit: May 20, 2020    Purpose of visit: Follow-up of KIMBERLY, review CPAP compliance    HPI: Hermelindo Duffy is a 59 yr old male with moderate KIMBERLY( AHI= 23.5 events per hour) and sleep associated hypoxemia diagnosed during HST obtained on 1/16/2020. He was prescribed auto CPAP with pressure settings between 5-15 cm H2O. He was  was set up at [facility] on February 14, 2020 with  a Resmed AirSense 10 Auto with pressures set at 5-15 cm H2O. He  received a [employer] Mask  Eson2  Nasal mask size Medium.    He was followed via virtual visit on April 1, 2020 when there were concerns about  poor CPAP compliance and nasal congestion precluding CPAP use.  Telephone visit has been scheduled today " for f/u KIMBERLY/review CPAP compliance.    Patient reports that he just obtained a new FFM on 5/13/2020 and has used it for the past couple nights.  He was not using the CPAP until he obtained the new full facemask, because he was not able to breathe with the previous mask  He is still getting used to it.  But he is able to breathe better with it.  He denies reports of air leaks but sees on his device, a red face indicating air leaks.  According to his wife, there are no reports of snoring/ observed apneas with CPAP and he has been sleeping well without too much of moving around.  He denies air hunger when he puts mask on.    He feels comfortable with the pressure settings on his device.  He denies waking up gasping for air or choking with the device.  He is a night person. Bed time: 12 [place]-1:30AM.  He denies any difficulty with initiating sleep. Wake time: 8AM-9:30 AM(on average he obtains 8 hours usually)  Occasionally he reports waking up for no apparent reason and takes the mask off and sleeps without device during the  last couple hours before he finally wakes up  Since it is only been the past couple of nights that he has used the device,  he is unable to notice any significant differences in his sleep quality/waking up comparatively more rested since he has been using the CPAP.  He reports occasional fatigue  He denies EDS.  He endorses an ESS of 4/24  Naps daily (habit):1-2 hours   Denies drowsiness while driving.    He started using the Flonase nasal spray daily which was previously recommended, for the past 3 weeks and has noticed improvement in his nasal congestion.      CPAP compliance download:  ResMed   Auto-PAP 5.0 - 15.0 cmH2O 30 day usage data:    6% of days with > 4 hours of use. 25/30 days with no use.   Average use 30 minutes per day.   95%ile Leak 47.52 L/min.   CPAP 95% pressure 7.8 cm.   AHI 5.1 events per hour.     Previous sleep study:  HST date: 1/16/2020:  Analysis  Time:  419 minutes     Respiration:   Sleep Associated Hypoxemia: hypoxemia was present. Baseline oxygen saturation was 92.7%.  Time with saturation less than or equal to 88% was 17.2 minutes. The lowest oxygen saturation was 83%.   Snoring: Snoring was present.  Respiratory events: The home study revealed a presence of 86 obstructive apneas and 0 mixed and central apneas. There were 78 hypopneas resulting in a combined apnea/hypopnea index [AHI] of 23.5 events per hour.  AHI was 62.1 per hour supine, 4.4 per hour on left side, and 10.6 per hour on right side.   Pattern: Excluding events noted above, respiratory rate and pattern was Normal.     Position: Percent of time spent: supine - 29.7%, prone - 0.0%, on left - 39.1%, on right - 31.2%.     Heart Rate: By pulse oximetry normal rate was noted.      Assessment:   Moderate obstructive sleep apnea, more prominent in the supine position.  Sleep associated hypoxemia was present.         Current meds:  Current Outpatient Medications   Medication Sig Dispense Refill     aspirin 81 MG tablet Take  by mouth daily.       buPROPion (WELLBUTRIN XL) 150 MG 24 hr tablet Take 1 tablet (150 mg) by mouth every morning 90 tablet 3     cetirizine (ZYRTEC) 10 MG tablet        citalopram (CELEXA) 20 MG tablet Take 1 tablet (20 mg) by mouth daily 90 tablet 3     Dupilumab (DUPIXENT) 300 MG/2ML syringe Inject 300 mg Subcutaneous as needed Every other week as needed       ezetimibe (ZETIA) 10 MG tablet Take 1 tablet (10 mg) by mouth daily 90 tablet 3     famotidine (PEPCID) 20 MG tablet Take 20 mg by mouth 2 times daily       fluticasone (FLONASE) 50 MCG/ACT spray Spray 2 sprays into both nostrils daily 1 Bottle 11     ibuprofen (ADVIL/MOTRIN) 100 MG tablet Take 100 mg by mouth as needed       rosuvastatin (CRESTOR) 20 MG PO tablet Take 1 tablet (20 mg) by mouth daily 90 tablet 2       Problem list:  Patient Active Problem List   Diagnosis     KIMBERLY (obstructive sleep apnea)- mild, no  CPAP     Mild major depression (H)     Hyperlipidemia LDL goal <70     Coronary artery disease s/p JIA to LAD 2010     Past medical history:  Past Medical History:   Diagnosis Date     Acute duodenal ulcer with bleeding 1980s     Coronary artery disease      Eczema      Hyperlipidemia LDL goal < 70     intolerant to high dose statins     Hypertension      Mild major depression (H)       Past surgical history:  Past Surgical History:   Procedure Laterality Date     ANGIOPLASTY  08/2010    LAD PTCA/JIA     HERNIORRHAPHY INGUINAL      right, x2     Allergies:  Allergies   Allergen Reactions     Atorvastatin      Muscle aches     Questran [Cholestyramine]      Nausea     Social history:  Social History     Socioeconomic History     Marital status:      Spouse name: Not on file     Number of children: Not on file     Years of education: Not on file     Highest education level: Not on file   Occupational History     Occupation: [employer]     Comment: [employer]    Social Needs     Financial resource strain: Not on file     Food insecurity     Worry: Not on file     Inability: Not on file     Transportation needs     Medical: Not on file     Non-medical: Not on file   Tobacco Use     Smoking status: Never Smoker     Smokeless tobacco: Never Used   Substance and Sexual Activity     Alcohol use: Yes     Alcohol/week: 2.0 standard drinks     Types: 2 Glasses of wine per week     Frequency: 4 or more times a week     Drinks per session: 1 or 2     Comment: 2 glasses of wine most nights     Drug use: No     Sexual activity: Yes     Partners: Female   Lifestyle     Physical activity     Days per week: Not on file     Minutes per session: Not on file     Stress: Not on file   Relationships     Social connections     Talks on phone: Not on file     Gets together: Not on file     Attends Jewish service: Not on file     Active member of club or organization: Not on file     Attends meetings of clubs  or organizations: Not on file     Relationship status: Not on file     Intimate partner violence     Fear of current or ex partner: Not on file     Emotionally abused: Not on file     Physically abused: Not on file     Forced sexual activity: Not on file   Other Topics Concern      Service Not Asked     Blood Transfusions Not Asked     Caffeine Concern No     Occupational Exposure Not Asked     Hobby Hazards Not Asked     Sleep Concern Yes     Comment: snoring     Stress Concern Not Asked     Weight Concern No     Special Diet No     Back Care Not Asked     Exercise Yes     Comment: Active     Bike Helmet Not Asked     Seat Belt Yes     Self-Exams Not Asked     Parent/sibling w/ CABG, MI or angioplasty before 65F 55M? No   Social History Narrative     Not on file     Family history:  Family History   Problem Relation Age of Onset     Heart Disease Father         MI approx age 63     Cerebrovascular Disease Paternal Uncle      Diabetes No family hx of      Hypertension No family hx of      Exam:  There were no vitals taken for this visit.  GENERAL APPEARANCE: alert in no distress   PSYCH: Alert and oriented times 3; coherent speech, normal   rate and volume, able to articulate logical thoughts, able   to abstract reason, no tangential thoughts, no hallucinations   or delusions  His affect is normal  RESP: No cough, no audible wheezing, able to talk in full sentences  Remainder of exam unable to be completed due to telephone visit     ASSESSMENT/PLAN:  1. Moderate obstructive sleep apnea, predominant during sleep in supine position with sleep associated hypoxemia:  Patient recently resumed CPAP use after obtaining a new full facemask which he seems to be tolerating better compared to the previous mask.   Encouraged him to use the CPAP regularly during sleep and use it during the entire sleep duration to derive maximum benefit.    He was instructed to avoid naps during the day, but if the naps are inevitable to  "reduce the duration of naps to 30 minutes  and use the CPAP device even during naps.  Will  continue follow up through the sleep therapy management program.     2. Nasal congestion: The nasal congestion was one of the factors that previously was precluding the CPAP compliance.  He reports  some improvement in the nasal congestion since he has been using the Flonase nasal spray for the past 3 weeks.  Recommended him to continue using the heated humidity with the CPAP, Flonase nasal spray 1 spray in each nostril once daily and using OTC ocean saline nasal spray as needed.  Since his symptoms seem to improve referral to ENT provider has not been considered.     3. Sleep-related hypoxemia that was observed during the home sleep study obtained in January 2020: Once his compliance improves, plan is to obtain an overnight oximetry(AGATHA) with the auto CPAP along with parallel compliance download, to check for resolution of the hypoxemia that was observed during the HST.  Patient is interested in setting up My Chart and the plan is to communicate the results of the AGATHA with him via [employer]     We discussed weight management with diet and exercise.     Patient was advised to avoid driving or operating heavy machinery if drowsy or sleepy to prevent accidents.     If the overnight oximetry is normal and CPAP  compliance continues to improve,  he will follow-up at Sleep clinic in 1 year or sooner if any concerns.    Phone call duration: 23 minutes  Start time:10:09AM  End time: 10:32 AM     The above note was dictated using voice recognition software. Although reviewed after completion, some word and grammatical error may remain . Please contact the author for any clarifications.     \"I spent a total of 23 minutes with Hermelindo Duffy during today's telephone  visit. Over 50% of this time was spent counseling the patient and  coordinating care regarding sleep-related breathing disorder, CPAP treatment and compliance, going over " "PAP download/sleep study, nasal congestion\"      Thai Heller MD  [address] [address] [phone]  Dept: [phone]     "

## 2020-05-20 NOTE — Clinical Note
Jung Sesay and Leonard.    Kindly continue follow up through [facility]. Patient recently got FFM.   Please provide me with updated DL in 1 month. If compliance improves, I plan to obtain AGATHA with the auto CPAP.    Thanks,  Asha

## 2020-10-26 ENCOUNTER — VIRTUAL VISIT (OUTPATIENT)
Dept: FAMILY MEDICINE | Facility: OTHER | Age: 60
End: 2020-10-26
Payer: COMMERCIAL

## 2020-10-26 ENCOUNTER — CARE COORDINATION (OUTPATIENT)
Dept: CARDIOLOGY | Facility: CLINIC | Age: 60
End: 2020-10-26

## 2020-10-26 PROCEDURE — 99421 OL DIG E/M SVC 5-10 MIN: CPT | Performed by: PHYSICIAN ASSISTANT

## 2020-10-26 NOTE — PROGRESS NOTES
"Date: 10/26/2020 18:33:28  Clinician: Kwadwo Inman  Clinician NPI: [id number]  Patient: Hermelindo Duffy  Patient : 1960  Patient Address: [address] [place], [place] [address] [address]  Patient Phone: [phone]  Visit Protocol: URI  Patient Summary:  Hermelindo is a 60 year old ( : 1960 ) male who initiated a [facility] Visit for COVID-19 (Coronavirus) evaluation and screening. When asked the question \"Please sign me up to receive news, health information and promotions from [facility].\", Hermelindo responded \"No\".    Hermelindo states his symptoms started 1-2 days ago.   His symptoms consist of facial pain or pressure, myalgia, and malaise.   Symptom details   Facial pain or pressure: The facial pain or pressure does not feel worse when bending or leaning forward.    Hermelindo denies having ear pain, headache, wheezing, fever, cough, nasal congestion, anosmia, vomiting, rhinitis, nausea, chills, sore throat, teeth pain, ageusia, and diarrhea. He also denies taking antibiotic medication in the past month and having recent facial or sinus surgery in the past 60 days. He is not experiencing dyspnea.   Precipitating events  He has not recently been exposed to someone with influenza. Hermelindo has been in close contact with the following high risk individuals: children under the age of 5.   Pertinent COVID-19 (Coronavirus) information  In the past 14 days, Hermelindo has not worked in a congregate living setting.   He does not work or volunteer as healthcare worker or a  and does not work or volunteer in a healthcare facility.   Hermelindo also has not lived in a congregate living setting in the past 14 days. He does not live with a healthcare worker.   Hermelindo has not had a close contact with a laboratory-confirmed COVID-19 patient within 14 days of symptom onset.   Since 2019, Hermelindo and has not had upper respiratory infection or influenza-like illness. Has not been diagnosed with lab-confirmed COVID-19 test   Pertinent " medical history  Hermelindo does not need a return to work/school note.   Weight: 198 lbs   Hermelindo does not smoke or use smokeless tobacco.   Additional information as reported by the patient (free text): Stent   Weight: 198 lbs    MEDICATIONS: Advil oral, citalopram oral, Children's Aspirin oral, Zetia oral, Zyrtec oral, Pepcid AC oral, rosuvastatin oral, bupropion HCl (bulk), ALLERGIES: NKDA  Clinician Response:  Dear Hermelindo,  Based on the information provided, you have viral sinusitis, also known as a sinus infection. Sinus infections are caused by bacteria or a virus and symptoms are almost always identical. The difference between the 2 types of infections is timing.  Sinus infections start as viral infections and symptoms improve on their own in about 7 days. If symptoms have not improved after 7 days or have even worsened, a bacterial infection may have developed.  Medication information  Because you have a viral infection, antibiotics will not help you get better. Treating a viral infection with antibiotics could actually make you feel worse.  Unless you are allergic to the over-the-counter medication(s) below, I recommend using:     A decongestant such as Sudafed PE or store brand.    A sinus irrigation kit such as Sinus Rinse, Neti Pot, SinuCleanse, or store brand. Be sure to use sterile or previously boiled water to prevent unwanted infections.     Over-the-counter medications do not require a prescription. Ask the pharmacist if you have any questions.  Self care  Steps you can take to be as comfortable as possible:     Rest.    Drink plenty of fluids.     When to seek care  Please be seen in a clinic or urgent care if any of the following occur:   New symptoms develop, or symptoms become worse   Call ahead before going to the clinic or urgent care.  Additional treatment plan   Your symptoms show that you may have coronavirus (COVID-19). This illness can cause fever, cough and trouble breathing. Many people get a  mild case and get better on their own. Some people can get very sick.  What should I do?  We would like to test you for this virus.   1. Please call [phone] to schedule your visit. Explain that you were referred by [facility] to have a COVID-19 test. Be ready to share your [facility] visit ID number.  Please note that if you are assessed for Covid-19 testing and receive an order for testing from [facility], that the scheduling of your Covid test at [facility] may be delayed by three or four days or more due to limited availability for testing. Additional options for testing can be found on the [place] Covid-19 Response website. https://[place].gov/covid19/    The following will serve as your written order for this COVID Test, ordered by me, for the indication of suspected COVID [Z20.828]: The test will be ordered in [facility], our electronic health record, after you are scheduled. It will show as ordered and authorized by Andriy Hill MD.  Order: COVID-19 (Coronavirus) PCR for SYMPTOMATIC testing from [facility].   2. When it's time for your COVID test:  Stay at least 6 feet away from others. (If someone will drive you to your test, stay in the backseat, as far away from the  as you can.)   Cover your mouth and nose with a mask, tissue or washcloth.  Go straight to the testing site. Don't make any stops on the way there or back.      3.Starting now: Stay home and away from others (self-isolate) until:   You've had no fever---and no medicine that reduces fever---for one full day (24 hours). And...   Your other symptoms have gotten better. For example, your cough or breathing has improved. And...   At least 10 days have passed since your symptoms started.       During this time, don't leave the house except for testing or medical care.   Stay in your own room, even for meals. Use your own bathroom if you can.   Stay away from others in your home. No hugging, kissing or shaking hands. No visitors.  Don't go to work, school or  "anywhere else.    Clean \"high touch\" surfaces often (doorknobs, counters, handles, etc.). Use a household cleaning spray or wipes. You'll find a full list of  on the EPA website: www.epa.gov/pesticide-registration/list-n-disinfectants-use-against-sars-cov-2.   Cover your mouth and nose with a mask, tissue or washcloth to avoid spreading germs.  Wash your hands and face often. Use soap and water.  Caregivers in these groups are at risk for severe illness due to COVID-19:  o People 65 years and older  o People who live in a nursing home or long-term care facility  o People with chronic disease (lung, heart, cancer, diabetes, kidney, liver, immunologic)  o People who have a weakened immune system, including those who:   Are in cancer treatment  Take medicine that weakens the immune system, such as corticosteroids  Had a bone marrow or organ transplant  Have an immune deficiency  Have poorly controlled HIV or AIDS  Are obese (body mass index of 40 or higher)  Smoke regularly   o Caregivers should wear gloves while washing dishes, handling laundry and cleaning bedrooms and bathrooms.  o Use caution when washing and drying laundry: Don't shake dirty laundry, and use the warmest water setting that you can.  o For more tips, go to www.cdc.gov/coronavirus/2019-ncov/downloads/10Things.pdf.    4.Sign up for [employer]. We know it's scary to hear that you might have COVID-19. We want to track your symptoms to make sure you're okay over the next 2 weeks. Please look for an email from [employer]---this is a free, online program that we'll use to keep in touch. To sign up, follow the link in the email. Learn more at [email or web address]  How can I take care of myself?   Get lots of rest. Drink extra fluids (unless a doctor has told you not to).   Take Tylenol (acetaminophen) for fever or pain. If you have liver or kidney problems, ask your family doctor if it's okay to take Tylenol.   Adults can take " either:    650 mg (two 325 mg pills) every 4 to 6 hours, or...   1,000 mg (two 500 mg pills) every 8 hours as needed.    Note: Don't take more than 3,000 mg in one day. Acetaminophen is found in many medicines (both prescribed and over-the-counter medicines). Read all labels to be sure you don't take too much.   For children, check the Tylenol bottle for the right dose. The dose is based on the child's age or weight.    If you have other health problems (like cancer, heart failure, an organ transplant or severe kidney disease): Call your specialty clinic if you don't feel better in the next 2 days.       Know when to call 911. Emergency warning signs include:    Trouble breathing or shortness of breath Pain or pressure in the chest that doesn't go away Feeling confused like you haven't felt before, or not being able to wake up Bluish-colored lips or face.  Where can I get more information?   [facility] -- About COVID-19: [email or web address]   CDC -- What to Do If You're Sick: www.cdc.gov/coronavirus/2019-ncov/about/steps-when-sick.html   CDC -- Ending Home Isolation: www.cdc.gov/coronavirus/2019-ncov/hcp/disposition-in-home-patients.html   CDC -- Caring for Someone: www.cdc.gov/coronavirus/2019-ncov/if-you-are-sick/care-for-someone.html   [facility] -- Interim Guidance for Hospital Discharge to Home: www.health.[facility].[place]/diseases/coronavirus/hcp/hospdischarge.pdf   [facility] clinical trials (COVID-19 research studies): clinicalaffairs.[facility]/n-clinical-trials    Below are the COVID-19 hotlines at the [place] Department of Health ([facility]). Interpreters are available.    For health questions: Call 532-931-2944 or 1-246.789.5060 (7 a.m. to 7 p.m.) For questions about schools and childcare: Call 485-973-6723 or 1-722.667.2231 (7 a.m. to 7 p.m.)    Diagnosis: Contact with and (suspected) exposure to other viral communicable diseases  Diagnosis ICD: Z20.828  Additional Clinician Notes:   If  your symptoms are not improving or worsen, please go to one of our urgent care locations for evaluation and possible lab work.

## 2020-10-26 NOTE — PROGRESS NOTES
Pt called to request a COVID test as he has fatigue and body aches. I told pt he should call PCP clinic to discuss symptoms and find out how to get COVID tested. Katey CROWLEY October 26, 2020, 12:28 PM

## 2020-12-29 DIAGNOSIS — F33.0 MAJOR DEPRESSIVE DISORDER, RECURRENT EPISODE, MILD (H): ICD-10-CM

## 2020-12-30 DIAGNOSIS — E78.00 HYPERCHOLESTEROLEMIA: ICD-10-CM

## 2020-12-30 RX ORDER — ROSUVASTATIN CALCIUM 20 MG/1
20 TABLET, COATED ORAL DAILY
Qty: 90 TABLET | Refills: 0 | Status: SHIPPED | OUTPATIENT
Start: 2020-12-30 | End: 2021-04-20

## 2020-12-31 RX ORDER — CITALOPRAM HYDROBROMIDE 20 MG/1
TABLET ORAL
Qty: 30 TABLET | Refills: 0 | Status: SHIPPED | OUTPATIENT
Start: 2020-12-31 | End: 2021-02-04

## 2020-12-31 RX ORDER — BUPROPION HYDROCHLORIDE 150 MG/1
TABLET ORAL
Qty: 30 TABLET | Refills: 0 | Status: SHIPPED | OUTPATIENT
Start: 2020-12-31 | End: 2021-02-04

## 2021-01-15 ENCOUNTER — HEALTH MAINTENANCE LETTER (OUTPATIENT)
Age: 61
End: 2021-01-15

## 2021-01-29 ENCOUNTER — VIRTUAL VISIT (OUTPATIENT)
Dept: CARDIOLOGY | Facility: CLINIC | Age: 61
End: 2021-01-29
Payer: COMMERCIAL

## 2021-01-29 DIAGNOSIS — E78.5 HYPERLIPIDEMIA LDL GOAL <70: ICD-10-CM

## 2021-01-29 DIAGNOSIS — I25.10 CORONARY ARTERY DISEASE INVOLVING NATIVE CORONARY ARTERY OF NATIVE HEART WITHOUT ANGINA PECTORIS: Primary | ICD-10-CM

## 2021-01-29 DIAGNOSIS — G47.33 OSA (OBSTRUCTIVE SLEEP APNEA): ICD-10-CM

## 2021-01-29 DIAGNOSIS — I25.10 CORONARY ARTERY DISEASE INVOLVING NATIVE CORONARY ARTERY OF NATIVE HEART WITHOUT ANGINA PECTORIS: ICD-10-CM

## 2021-01-29 LAB
ALT SERPL W P-5'-P-CCNC: 60 U/L (ref 0–70)
ANION GAP SERPL CALCULATED.3IONS-SCNC: 6 MMOL/L (ref 3–14)
BUN SERPL-MCNC: 14 MG/DL (ref 7–30)
CALCIUM SERPL-MCNC: 8.5 MG/DL (ref 8.5–10.1)
CHLORIDE SERPL-SCNC: 110 MMOL/L (ref 94–109)
CHOLEST SERPL-MCNC: 147 MG/DL
CO2 SERPL-SCNC: 25 MMOL/L (ref 20–32)
CREAT SERPL-MCNC: 1.03 MG/DL (ref 0.66–1.25)
GFR SERPL CREATININE-BSD FRML MDRD: 77 ML/MIN/{1.73_M2}
GLUCOSE SERPL-MCNC: 101 MG/DL (ref 70–99)
HDLC SERPL-MCNC: 44 MG/DL
LDLC SERPL CALC-MCNC: 62 MG/DL
NONHDLC SERPL-MCNC: 103 MG/DL
POTASSIUM SERPL-SCNC: 4.1 MMOL/L (ref 3.4–5.3)
SODIUM SERPL-SCNC: 141 MMOL/L (ref 133–144)
TRIGL SERPL-MCNC: 207 MG/DL

## 2021-01-29 PROCEDURE — 36415 COLL VENOUS BLD VENIPUNCTURE: CPT | Performed by: INTERNAL MEDICINE

## 2021-01-29 PROCEDURE — 80061 LIPID PANEL: CPT | Performed by: INTERNAL MEDICINE

## 2021-01-29 PROCEDURE — 99213 OFFICE O/P EST LOW 20 MIN: CPT | Mod: 95 | Performed by: INTERNAL MEDICINE

## 2021-01-29 PROCEDURE — 80048 BASIC METABOLIC PNL TOTAL CA: CPT | Performed by: INTERNAL MEDICINE

## 2021-01-29 PROCEDURE — 84460 ALANINE AMINO (ALT) (SGPT): CPT | Performed by: INTERNAL MEDICINE

## 2021-01-29 NOTE — PROGRESS NOTES
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  He underwent stenting of the LAD in 2010 and fortunately, he has done well since then without recurrence of coronary artery disease issues.  He has a history of statin intolerance, primarily atorvastatin due to muscle pains.  He has been able to tolerate rosuvastatin at 20 mg per day, but this was not controlling his cholesterol numbers well and we added Zetia 10 mg a day.  His LDL was still slightly above 70 in the past, but this year, his numbers look a bit better.  His LDL is down to 62 with an HDL of 44 and total cholesterol 147.  His triglycerides are 207.  He has not had high blood pressure or other cardiac risk factors.  He is not on any blood pressure lowering medications.      Fortunately, he continues to do well.  He is not experiencing any chest discomfort symptoms or shortness of breath.  He is not exercising regularly but is active most of the day.      I do not have recent blood pressure readings or heart rate.  His weight is about 197 pounds, which is stable.      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  He has been doing well without recurrent ischemic heart disease symptoms since then.  We have been managing his cholesterol with a combination of rosuvastatin and Zetia and this year, his LDL is now below 70 with HDL above 40, meeting our goals for treatment.  He seems to be tolerating his medications well and I will not make any changes.  I will plan to see him back again next year for reevaluation and urged him to be active and continue eating well.  He does use a CPAP machine at night now.  Hopefully, that will help decrease his cardiovascular disease  risk as well.      cc:      Tha Ortega MD    [facility]   [address] [place] [id number]         ASHTYN VELEZ MD, [facility]             D: 2021   T: 2021   MT: DARNELL      Name:     HUBERT MIMS   MRN:      [id number]        Account:      [id number]   :      1960           Service Date: 2021      Document: [id number]

## 2021-01-29 NOTE — PROGRESS NOTES
Hermelindo is a 60 year old who is being evaluated via a billable video visit.      How would you like to obtain your AVS? MyChart  If the video visit is dropped, the invitation should be resent by: Text to cell phone: [phone]  Will anyone else be joining your video visit? No     Review Of Systems  Skin: NEGATIVE  Eyes:Ears/Nose/Throat: NEGATIVE  Respiratory: sleep apnea, uses CPAP  Cardiovascular:NEGATIVE  Gastrointestinal: NEGATIVE  Genitourinary:NEGATIVE   Musculoskeletal: NEGATIVE  Neurologic: NEGATIVE  Psychiatric: NEGATIVE  Hematologic/Lymphatic/Immunologic: NEGATIVE  Endocrine:  NEGATIVE  Vitals - Patient Reported  Systolic (Patient Reported): (n/a)  Diastolic (Patient Reported): (n/a)  Weight (Patient Reported): 89.4 kg (197 lb)  Pulse (Patient Reported): 98      Telephone number of patient:[phone]    Noemi Alvarado LPN      Video Start Time: 4:32 PM  Video-Visit Details    Type of service:  Video Visit    Video End Time:4:44    Originating Location (pt. Location): Home    Distant Location (provider location):  [facility]     Platform used for Video Visit: [facility]     HPI and Plan:   See dictation    Orders Placed This Encounter   Procedures     Lipid Profile     ALT     Follow-Up with Cardiologist     No orders of the defined types were placed in this encounter.    There are no discontinued medications.      Encounter Diagnoses   Name Primary?     Coronary artery disease involving native coronary artery of native heart without angina pectoris Yes     Hyperlipidemia LDL goal <70      KIMBERLY (obstructive sleep apnea)- uses CPAP        CURRENT MEDICATIONS:  Current Outpatient Medications   Medication Sig Dispense Refill     aspirin 81 MG tablet Take  by mouth daily.       buPROPion (WELLBUTRIN XL) 150 MG 24 hr tablet TAKE ONE TABLET BY MOUTH EVERY MORNING 30 tablet 0     cetirizine (ZYRTEC) 10 MG tablet        citalopram (CELEXA) 20 MG tablet TAKE ONE TABLET BY MOUTH ONCE DAILY  30 tablet 0     Dupilumab (DUPIXENT) 300 MG/2ML syringe Inject 300 mg Subcutaneous as needed Every other week as needed       ezetimibe (ZETIA) 10 MG tablet Take 1 tablet (10 mg) by mouth daily 90 tablet 3     famotidine (PEPCID) 20 MG tablet Take 20 mg by mouth 2 times daily       fluticasone (FLONASE) 50 MCG/ACT spray Spray 2 sprays into both nostrils daily 1 Bottle 11     ibuprofen (ADVIL/MOTRIN) 100 MG tablet Take 100 mg by mouth as needed       rosuvastatin (CRESTOR) 20 MG tablet Take 1 tablet (20 mg) by mouth daily NO REFILLS- needs an appointment. 90 tablet 0       ALLERGIES     Allergies   Allergen Reactions     Atorvastatin      Muscle aches     Questran [Cholestyramine]      Nausea       PAST MEDICAL HISTORY:  Past Medical History:   Diagnosis Date     Acute duodenal ulcer with bleeding 1980s     Coronary artery disease      Eczema      Hyperlipidemia LDL goal < 70     intolerant to high dose statins     Hypertension      Mild major depression (H)        PAST SURGICAL HISTORY:  Past Surgical History:   Procedure Laterality Date     ANGIOPLASTY  08/2010    LAD PTCA/JIA     HERNIORRHAPHY INGUINAL      right, x2       FAMILY HISTORY:  Family History   Problem Relation Age of Onset     Heart Disease Father         MI approx age 63     Cerebrovascular Disease Paternal Uncle      Diabetes No family hx of      Hypertension No family hx of        SOCIAL HISTORY:  Social History     Socioeconomic History     Marital status:      Spouse name: None     Number of children: None     Years of education: None     Highest education level: None   Occupational History     Occupation: Confucianist      Comment: [employer]    Social Needs     Financial resource strain: None     Food insecurity     Worry: None     Inability: None     Transportation needs     Medical: None     Non-medical: None   Tobacco Use     Smoking status: Never Smoker     Smokeless tobacco: Never Used   Substance and Sexual  Activity     Alcohol use: Yes     Alcohol/week: 2.0 standard drinks     Types: 2 Glasses of wine per week     Frequency: 4 or more times a week     Drinks per session: 1 or 2     Comment: 2 glasses of wine most nights     Drug use: No     Sexual activity: Yes     Partners: Female   Lifestyle     Physical activity     Days per week: None     Minutes per session: None     Stress: None   Relationships     Social connections     Talks on phone: None     Gets together: None     Attends Scientologist service: None     Active member of club or organization: None     Attends meetings of clubs or organizations: None     Relationship status: None     Intimate partner violence     Fear of current or ex partner: None     Emotionally abused: None     Physically abused: None     Forced sexual activity: None   Other Topics Concern      Service Not Asked     Blood Transfusions Not Asked     Caffeine Concern No     Occupational Exposure Not Asked     Hobby Hazards Not Asked     Sleep Concern Yes     Comment: snoring     Stress Concern Not Asked     Weight Concern No     Special Diet No     Back Care Not Asked     Exercise Yes     Comment: Active     Bike Helmet Not Asked     Seat Belt Yes     Self-Exams Not Asked     Parent/sibling w/ CABG, MI or angioplasty before 65F 55M? No   Social History Narrative     None       Physical Exam:  Vitals: There were no vitals taken for this visit.    General:  no apparent distress, normal body habitus, sitting upright.  ENT/Mouth:  membranes moist, no nasal discharge.  Normal head shape, no apparent injury or laceration.  Eyes:  no scleral icterus, normal conjunctivae.  No observed jaundice.  Neck:  no apparent neck swelling.   Chest/Lungs:  No breathing difficulty while speaking.  No audible wheezing.  No cough during conversation.  Cardiovascular:  No obviously elevated jugular venous pressure.  No apparent edema bilaterally in LE.   Abdomen:  no obvious abdominal distention.   Extremities:   no apparent cyanosis.  Skin:  no xanthelasma.  No facial lacerations.  Neurologic:  Normal arm motion bilateral, no tremors.    Psychiatric:  Alert and oriented x3, calm demeanor    The rest of the comprehensive physical examination is deferred due to public health emergency video visit restrictions.        Recent Lab Results:  LIPID RESULTS:  Lab Results   Component Value Date    CHOL 147 01/29/2021    HDL 44 01/29/2021    LDL 62 01/29/2021    TRIG 207 (H) 01/29/2021    CHOLHDLRATIO 4.9 (H) 05/20/2015       LIVER ENZYME RESULTS:  Lab Results   Component Value Date    AST 36 07/03/2014    ALT 60 01/29/2021       CBC RESULTS:  Lab Results   Component Value Date    WBC 6.8 08/07/2015    RBC 4.67 08/07/2015    HGB 15.1 08/07/2015    HCT 43.6 08/07/2015    MCV 93 08/07/2015    MCH 32.3 08/07/2015    MCHC 34.6 08/07/2015    RDW 12.6 08/07/2015     08/07/2015       BMP RESULTS:  Lab Results   Component Value Date     01/29/2021    POTASSIUM 4.1 01/29/2021    CHLORIDE 110 (H) 01/29/2021    CO2 25 01/29/2021    ANIONGAP 6 01/29/2021     (H) 01/29/2021    BUN 14 01/29/2021    CR 1.03 01/29/2021    GFRESTIMATED 77 01/29/2021    GFRESTBLACK 90 01/29/2021    FABRICIO 8.5 01/29/2021        A1C RESULTS:  No results found for: A1C    INR RESULTS:  Lab Results   Component Value Date    INR 0.91 08/15/2010           CC  No referring provider defined for this encounter.

## 2021-01-29 NOTE — LETTER
1/29/2021    Tha Ortega MD  [address]  [facility] [address]    RE: Hermelindo Duffy       Dear Colleague,    I had the pleasure of seeing Hermelindo Duffy in the [facility].    Hermelindo is a 60 year old who is being evaluated via a billable video visit.      How would you like to obtain your AVS? MyChart  If the video visit is dropped, the invitation should be resent by: Text to cell phone: [phone]  Will anyone else be joining your video visit? No     Review Of Systems  Skin: NEGATIVE  Eyes:Ears/Nose/Throat: NEGATIVE  Respiratory: sleep apnea, uses CPAP  Cardiovascular:NEGATIVE  Gastrointestinal: NEGATIVE  Genitourinary:NEGATIVE   Musculoskeletal: NEGATIVE  Neurologic: NEGATIVE  Psychiatric: NEGATIVE  Hematologic/Lymphatic/Immunologic: NEGATIVE  Endocrine:  NEGATIVE  Vitals - Patient Reported  Systolic (Patient Reported): (n/a)  Diastolic (Patient Reported): (n/a)  Weight (Patient Reported): 89.4 kg (197 lb)  Pulse (Patient Reported): 98      Telephone number of patient:[phone]    Noemi Alvarado LPN      Video Start Time: 4:32 PM  Video-Visit Details    Type of service:  Video Visit    Video End Time:4:44    Originating Location (pt. Location): Home    Distant Location (provider location):  [facility]     Platform used for Video Visit: Doximity     HPI and Plan:   See dictation    Orders Placed This Encounter   Procedures     Lipid Profile     ALT     Follow-Up with Cardiologist     No orders of the defined types were placed in this encounter.    There are no discontinued medications.      Encounter Diagnoses   Name Primary?     Coronary artery disease involving native coronary artery of native heart without angina pectoris Yes     Hyperlipidemia LDL goal <70      KIMBERLY (obstructive sleep apnea)- uses CPAP        CURRENT MEDICATIONS:  Current Outpatient Medications   Medication Sig Dispense Refill     aspirin 81 MG tablet  Take  by mouth daily.       buPROPion (WELLBUTRIN XL) 150 MG 24 hr tablet TAKE ONE TABLET BY MOUTH EVERY MORNING 30 tablet 0     cetirizine (ZYRTEC) 10 MG tablet        citalopram (CELEXA) 20 MG tablet TAKE ONE TABLET BY MOUTH ONCE DAILY 30 tablet 0     Dupilumab (DUPIXENT) 300 MG/2ML syringe Inject 300 mg Subcutaneous as needed Every other week as needed       ezetimibe (ZETIA) 10 MG tablet Take 1 tablet (10 mg) by mouth daily 90 tablet 3     famotidine (PEPCID) 20 MG tablet Take 20 mg by mouth 2 times daily       fluticasone (FLONASE) 50 MCG/ACT spray Spray 2 sprays into both nostrils daily 1 Bottle 11     ibuprofen (ADVIL/MOTRIN) 100 MG tablet Take 100 mg by mouth as needed       rosuvastatin (CRESTOR) 20 MG tablet Take 1 tablet (20 mg) by mouth daily NO REFILLS- needs an appointment. 90 tablet 0       ALLERGIES     Allergies   Allergen Reactions     Atorvastatin      Muscle aches     Questran [Cholestyramine]      Nausea       PAST MEDICAL HISTORY:  Past Medical History:   Diagnosis Date     Acute duodenal ulcer with bleeding 1980s     Coronary artery disease      Eczema      Hyperlipidemia LDL goal < 70     intolerant to high dose statins     Hypertension      Mild major depression (H)        PAST SURGICAL HISTORY:  Past Surgical History:   Procedure Laterality Date     ANGIOPLASTY  08/2010    LAD PTCA/JIA     HERNIORRHAPHY INGUINAL      right, x2       FAMILY HISTORY:  Family History   Problem Relation Age of Onset     Heart Disease Father         MI approx age 63     Cerebrovascular Disease Paternal Uncle      Diabetes No family hx of      Hypertension No family hx of        SOCIAL HISTORY:  Social History     Socioeconomic History     Marital status:      Spouse name: None     Number of children: None     Years of education: None     Highest education level: None   Occupational History     Occupation: Rastafari      Comment: [employer]    Social Needs     Financial resource  strain: None     Food insecurity     Worry: None     Inability: None     Transportation needs     Medical: None     Non-medical: None   Tobacco Use     Smoking status: Never Smoker     Smokeless tobacco: Never Used   Substance and Sexual Activity     Alcohol use: Yes     Alcohol/week: 2.0 standard drinks     Types: 2 Glasses of wine per week     Frequency: 4 or more times a week     Drinks per session: 1 or 2     Comment: 2 glasses of wine most nights     Drug use: No     Sexual activity: Yes     Partners: Female   Lifestyle     Physical activity     Days per week: None     Minutes per session: None     Stress: None   Relationships     Social connections     Talks on phone: None     Gets together: None     Attends Jain service: None     Active member of club or organization: None     Attends meetings of clubs or organizations: None     Relationship status: None     Intimate partner violence     Fear of current or ex partner: None     Emotionally abused: None     Physically abused: None     Forced sexual activity: None   Other Topics Concern      Service Not Asked     Blood Transfusions Not Asked     Caffeine Concern No     Occupational Exposure Not Asked     Hobby Hazards Not Asked     Sleep Concern Yes     Comment: snoring     Stress Concern Not Asked     Weight Concern No     Special Diet No     Back Care Not Asked     Exercise Yes     Comment: Active     Bike Helmet Not Asked     Seat Belt Yes     Self-Exams Not Asked     Parent/sibling w/ CABG, MI or angioplasty before 65F 55M? No   Social History Narrative     None       Physical Exam:  Vitals: There were no vitals taken for this visit.    General:  no apparent distress, normal body habitus, sitting upright.  ENT/Mouth:  membranes moist, no nasal discharge.  Normal head shape, no apparent injury or laceration.  Eyes:  no scleral icterus, normal conjunctivae.  No observed jaundice.  Neck:  no apparent neck swelling.   Chest/Lungs:  No breathing  difficulty while speaking.  No audible wheezing.  No cough during conversation.  Cardiovascular:  No obviously elevated jugular venous pressure.  No apparent edema bilaterally in LE.   Abdomen:  no obvious abdominal distention.   Extremities:  no apparent cyanosis.  Skin:  no xanthelasma.  No facial lacerations.  Neurologic:  Normal arm motion bilateral, no tremors.    Psychiatric:  Alert and oriented x3, calm demeanor    The rest of the comprehensive physical examination is deferred due to public [facility] emergency video visit restrictions.        Recent Lab Results:  LIPID RESULTS:  Lab Results   Component Value Date    CHOL 147 01/29/2021    HDL 44 01/29/2021    LDL 62 01/29/2021    TRIG 207 (H) 01/29/2021    CHOLHDLRATIO 4.9 (H) 05/20/2015       LIVER ENZYME RESULTS:  Lab Results   Component Value Date    AST 36 07/03/2014    ALT 60 01/29/2021       CBC RESULTS:  Lab Results   Component Value Date    WBC 6.8 08/07/2015    RBC 4.67 08/07/2015    HGB 15.1 08/07/2015    HCT 43.6 08/07/2015    MCV 93 08/07/2015    MCH 32.3 08/07/2015    MCHC 34.6 08/07/2015    RDW 12.6 08/07/2015     08/07/2015       BMP RESULTS:  Lab Results   Component Value Date     01/29/2021    POTASSIUM 4.1 01/29/2021    CHLORIDE 110 (H) 01/29/2021    CO2 25 01/29/2021    ANIONGAP 6 01/29/2021     (H) 01/29/2021    BUN 14 01/29/2021    CR 1.03 01/29/2021    GFRESTIMATED 77 01/29/2021    GFRESTBLACK 90 01/29/2021    FABRICIO 8.5 01/29/2021        A1C RESULTS:  No results found for: A1C    INR RESULTS:  Lab Results   Component Value Date    INR 0.91 08/15/2010       Service Date: 01/29/2021      HISTORY OF PRESENT ILLNESS:  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  He underwent stenting of the LAD in 2010 and fortunately, he has done well since then without recurrence of coronary artery disease  issues.  He has a history of statin intolerance, primarily atorvastatin due to muscle pains.  He has been able to tolerate rosuvastatin at 20 mg per day, but this was not controlling his cholesterol numbers well and we added Zetia 10 mg a day.  His LDL was still slightly above 70 in the past, but this year, his numbers look a bit better.  His LDL is down to 62 with an HDL of 44 and total cholesterol 147.  His triglycerides are 207.  He has not had high blood pressure or other cardiac risk factors.  He is not on any blood pressure lowering medications.      Fortunately, he continues to do well.  He is not experiencing any chest discomfort symptoms or shortness of breath.  He is not exercising regularly but is active most of the day.      I do not have recent blood pressure readings or heart rate.  His weight is about 197 pounds, which is stable.      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  He has been doing well without recurrent ischemic heart disease symptoms since then.  We have been managing his cholesterol with a combination of rosuvastatin and Zetia and this year, his LDL is now below 70 with HDL above 40, meeting our goals for treatment.  He seems to be tolerating his medications well and I will not make any changes.  I will plan to see him back again next year for reevaluation and urged him to be active and continue eating well.  He does use a CPAP machine at night now.  Hopefully, that will help decrease his cardiovascular disease risk as well.      cc:      Tha Ortega MD    [facility]   [address] [place] [id number]         ASHTYN VELEZ MD, FACC             D: 2021   T: 2021   MT: DARNELL      Name:     HUBERT DUFFY   MRN:      [id number]        Account:      [id number]   :      1960           Service Date: 2021      Document: [id number]         Thank you for allowing me to participate in the care of your patient.    Sincerely,     ASHTYN VELEZ MD     [facility]

## 2021-02-03 DIAGNOSIS — F33.0 MAJOR DEPRESSIVE DISORDER, RECURRENT EPISODE, MILD (H): ICD-10-CM

## 2021-02-03 NOTE — TELEPHONE ENCOUNTER
Routing refill request to provider for review/approval because:  Juju given x1 and patient did not follow up, please advise  Patient needs to be seen because it has been more than 1 year since last office visit.    Edyta HINESN, RN, PHN

## 2021-02-04 RX ORDER — CITALOPRAM HYDROBROMIDE 20 MG/1
TABLET ORAL
Qty: 30 TABLET | Refills: 0 | Status: SHIPPED | OUTPATIENT
Start: 2021-02-04 | End: 2021-02-18

## 2021-02-04 RX ORDER — BUPROPION HYDROCHLORIDE 150 MG/1
TABLET ORAL
Qty: 30 TABLET | Refills: 0 | Status: SHIPPED | OUTPATIENT
Start: 2021-02-04 | End: 2021-02-18

## 2021-02-05 NOTE — TELEPHONE ENCOUNTER
LM on pt's personal voicemail informing pt of appt needed for further refills    Stefania Mariano CMA

## 2021-02-18 ENCOUNTER — OFFICE VISIT (OUTPATIENT)
Dept: INTERNAL MEDICINE | Facility: CLINIC | Age: 61
End: 2021-02-18
Payer: COMMERCIAL

## 2021-02-18 VITALS
OXYGEN SATURATION: 97 % | BODY MASS INDEX: 30.68 KG/M2 | DIASTOLIC BLOOD PRESSURE: 74 MMHG | WEIGHT: 202.4 LBS | HEIGHT: 68 IN | SYSTOLIC BLOOD PRESSURE: 122 MMHG | TEMPERATURE: 96.8 F | HEART RATE: 70 BPM

## 2021-02-18 DIAGNOSIS — E78.5 HYPERLIPIDEMIA LDL GOAL <70: ICD-10-CM

## 2021-02-18 DIAGNOSIS — J31.0 CHRONIC RHINITIS: ICD-10-CM

## 2021-02-18 DIAGNOSIS — E66.811 CLASS 1 OBESITY WITH SERIOUS COMORBIDITY AND BODY MASS INDEX (BMI) OF 31.0 TO 31.9 IN ADULT, UNSPECIFIED OBESITY TYPE: ICD-10-CM

## 2021-02-18 DIAGNOSIS — I25.10 CORONARY ARTERY DISEASE INVOLVING NATIVE CORONARY ARTERY OF NATIVE HEART WITHOUT ANGINA PECTORIS: ICD-10-CM

## 2021-02-18 DIAGNOSIS — R73.9 HYPERGLYCEMIA: ICD-10-CM

## 2021-02-18 DIAGNOSIS — Z12.11 SCREEN FOR COLON CANCER: ICD-10-CM

## 2021-02-18 DIAGNOSIS — F33.0 MAJOR DEPRESSIVE DISORDER, RECURRENT EPISODE, MILD (H): ICD-10-CM

## 2021-02-18 DIAGNOSIS — Z00.00 ROUTINE GENERAL MEDICAL EXAMINATION AT A HEALTH CARE FACILITY: Primary | ICD-10-CM

## 2021-02-18 PROCEDURE — 99396 PREV VISIT EST AGE 40-64: CPT | Performed by: INTERNAL MEDICINE

## 2021-02-18 RX ORDER — BUPROPION HYDROCHLORIDE 150 MG/1
150 TABLET ORAL EVERY MORNING
Qty: 90 TABLET | Refills: 3 | Status: SHIPPED | OUTPATIENT
Start: 2021-02-18 | End: 2022-04-08

## 2021-02-18 RX ORDER — CITALOPRAM HYDROBROMIDE 20 MG/1
20 TABLET ORAL DAILY
Qty: 90 TABLET | Refills: 3 | Status: SHIPPED | OUTPATIENT
Start: 2021-02-18 | End: 2022-04-08

## 2021-02-18 ASSESSMENT — MIFFLIN-ST. JEOR: SCORE: 1688.61

## 2021-02-18 ASSESSMENT — PATIENT HEALTH QUESTIONNAIRE - PHQ9: SUM OF ALL RESPONSES TO PHQ QUESTIONS 1-9: 3

## 2021-02-18 NOTE — NURSING NOTE
"Chief Complaint   Patient presents with     Physical     pt is fasting     /74   Pulse 70   Temp 96.8  F (36  C) (Temporal)   Ht 1.721 m (5' 7.75\")   Wt 91.8 kg (202 lb 6.4 oz)   SpO2 97%   BMI 31.00 kg/m   Estimated body mass index is 31 kg/m  as calculated from the following:    Height as of this encounter: 1.721 m (5' 7.75\").    Weight as of this encounter: 91.8 kg (202 lb 6.4 oz).        Health Maintenance due pending provider review:  Colonoscopy/FIT and PHQ9    Order pended  phq given    Stefania Mariano CMA  "

## 2021-02-18 NOTE — PROGRESS NOTES
SUBJECTIVE:   CC: Hermelindo Duffy is an 60 year old male who presents for preventative health visit.       Patient has been advised of split billing requirements and indicates understanding: Yes  Healthy Habits:     Getting at least 3 servings of Calcium per day:  NO    Bi-annual eye exam:  NO    Dental care twice a year:  Yes    Sleep apnea or symptoms of sleep apnea:  Daytime drowsiness, Excessive snoring and Sleep apnea    Diet:  Regular (no restrictions)    Frequency of exercise:  None    Taking medications regularly:  Yes    Medication side effects:  None    PHQ-2 Total Score: 1    Additional concerns today:  No              Today's PHQ-2 Score:   PHQ-2 ( 1999 Pfizer) 2/18/2021   Q1: Little interest or pleasure in doing things 0   Q2: Feeling down, depressed or hopeless 1   PHQ-2 Score 1   Q1: Little interest or pleasure in doing things Not at all   Q2: Feeling down, depressed or hopeless Several days   PHQ-2 Score 1       Abuse: Current or Past(Physical, Sexual or Emotional)- NO  Do you feel safe in your environment? YES    Have you ever done Advance Care Planning? (For example, a Health Directive, POLST, or a discussion with a medical provider or your loved ones about your wishes): Yes, advance care planning is on file.    Social History     Tobacco Use     Smoking status: Never Smoker     Smokeless tobacco: Never Used   Substance Use Topics     Alcohol use: Yes     Alcohol/week: 2.0 standard drinks     Types: 2 Glasses of wine per week     Frequency: 4 or more times a week     Drinks per session: 1 or 2     Comment: 2 glasses of wine most nights         Alcohol Use 2/18/2021   Prescreen: >3 drinks/day or >7 drinks/week? No   No flowsheet data found.    Last PSA: No results found for: PSA    Reviewed orders with patient. Reviewed health maintenance and updated orders accordingly - Yes    Labs reviewed in EPIC    Reviewed and updated as needed this visit by clinical staff  Tobacco  Allergies  Meds   Med  Hx  Surg Hx  Fam Hx  Soc Hx        Reviewed and updated as needed this visit by Provider                    Review of Systems  CONSTITUTIONAL: NEGATIVE for fever, chills, change in weight  INTEGUMENTARY/SKIN: NEGATIVE for worrisome rashes, moles or lesions  EYES: NEGATIVE for vision changes or irritation  ENT: NEGATIVE for ear, mouth and throat problems  RESP: NEGATIVE for significant cough or SOB  CV: NEGATIVE for chest pain, palpitations or peripheral edema  GI: NEGATIVE for nausea, abdominal pain, heartburn, or change in bowel habits   male: negative for dysuria, hematuria, decreased urinary stream, erectile dysfunction, urethral discharge  MUSCULOSKELETAL: NEGATIVE for significant arthralgias or myalgia  NEURO: NEGATIVE for weakness, dizziness or paresthesias  PSYCHIATRIC: NEGATIVE for changes in mood or affect    OBJECTIVE:   There were no vitals taken for this visit.    Physical Exam  GENERAL: healthy, alert and no distress  EYES: Eyes grossly normal to inspection, PERRL and conjunctivae and sclerae normal  HENT: ear canals and TM's normal, nose and mouth without ulcers or lesions  NECK: no adenopathy, no asymmetry, masses, or scars and thyroid normal to palpation  RESP: lungs clear to auscultation - no rales, rhonchi or wheezes  CV: regular rate and rhythm, normal S1 S2, no S3 or S4, no murmur, click or rub, no peripheral edema and peripheral pulses strong  ABDOMEN: soft, nontender, no hepatosplenomegaly, no masses and bowel sounds normal  MS: no gross musculoskeletal defects noted, no edema  SKIN: no suspicious lesions or rashes  NEURO: Normal strength and tone, mentation intact and speech normal  PSYCH: mentation appears normal, affect normal/bright  LYMPH: no cervical, supraclavicular, axillary, or inguinal adenopathy    Labs reviewed in Epic    ASSESSMENT/PLAN:   1. Routine general medical examination at a health care facility  Updated screening, immunizations, prevention.  Please see health  "maintenance list, care gaps   Pt deferred prostate ca screening  Colon ca screening- preferred cologuard    2. Chronic rhinitis  - OTOLARYNGOLOGY REFERRAL    3. Hyperglycemia  - Glucose; Future  - Hemoglobin A1c; Future    4. Class 1 obesity with serious comorbidity and body mass index (BMI) of 31.0 to 31.9 in adult, unspecified obesity type  - COMPREHENSIVE WEIGHT MANAGEMENT    5. Major depressive disorder, recurrent episode, mild (H)  stable  - citalopram (CELEXA) 20 MG tablet; Take 1 tablet (20 mg) by mouth daily  Dispense: 90 tablet; Refill: 3  - buPROPion (WELLBUTRIN XL) 150 MG 24 hr tablet; Take 1 tablet (150 mg) by mouth every morning  Dispense: 90 tablet; Refill: 3    6. Screen for colon cancer  - COLOGUARD(Biom'Up)    7. Coronary artery disease involving native coronary artery of native heart without angina pectoris  8. Hyperlipidemia LDL goal <70  - stable. Per cardiology. Cont statin / zetia      Patient has been advised of split billing requirements and indicates understanding: Yes  COUNSELING:   Reviewed preventive health counseling, as reflected in patient instructions    Estimated body mass index is 29.87 kg/m  as calculated from the following:    Height as of 4/1/20: 1.721 m (5' 7.75\").    Weight as of 4/1/20: 88.5 kg (195 lb).         He reports that he has never smoked. He has never used smokeless tobacco.      Counseling Resources:  ATP IV Guidelines  Pooled Cohorts Equation Calculator  FRAX Risk Assessment  ICSI Preventive Guidelines  Dietary Guidelines for Americans, 2010  USDA's MyPlate  ASA Prophylaxis  Lung CA Screening    Tha Ortega MD  [facility]  "

## 2021-03-03 LAB — COLOGUARD-ABSTRACT: NEGATIVE

## 2021-03-11 DIAGNOSIS — I25.10 CORONARY ARTERY DISEASE INVOLVING NATIVE CORONARY ARTERY OF NATIVE HEART WITHOUT ANGINA PECTORIS: ICD-10-CM

## 2021-03-11 DIAGNOSIS — E78.5 HYPERLIPIDEMIA LDL GOAL <70: ICD-10-CM

## 2021-03-11 RX ORDER — EZETIMIBE 10 MG/1
10 TABLET ORAL DAILY
Qty: 90 TABLET | Refills: 2 | Status: SHIPPED | OUTPATIENT
Start: 2021-03-11 | End: 2021-12-31

## 2021-04-20 DIAGNOSIS — E78.00 HYPERCHOLESTEROLEMIA: ICD-10-CM

## 2021-04-20 RX ORDER — ROSUVASTATIN CALCIUM 20 MG/1
20 TABLET, COATED ORAL DAILY
Qty: 90 TABLET | Refills: 0 | Status: CANCELLED | OUTPATIENT
Start: 2021-04-20

## 2021-04-20 RX ORDER — ROSUVASTATIN CALCIUM 20 MG/1
20 TABLET, COATED ORAL DAILY
Qty: 90 TABLET | Refills: 2 | Status: SHIPPED | OUTPATIENT
Start: 2021-04-20 | End: 2022-02-11

## 2021-08-09 ENCOUNTER — OFFICE VISIT (OUTPATIENT)
Dept: INTERNAL MEDICINE | Facility: CLINIC | Age: 61
End: 2021-08-09
Payer: COMMERCIAL

## 2021-08-09 VITALS
WEIGHT: 194.5 LBS | SYSTOLIC BLOOD PRESSURE: 124 MMHG | BODY MASS INDEX: 29.79 KG/M2 | HEART RATE: 69 BPM | DIASTOLIC BLOOD PRESSURE: 72 MMHG | TEMPERATURE: 97.5 F | OXYGEN SATURATION: 98 %

## 2021-08-09 DIAGNOSIS — R10.13 EPIGASTRIC PAIN: Primary | ICD-10-CM

## 2021-08-09 DIAGNOSIS — Z11.59 NEED FOR HEPATITIS C SCREENING TEST: ICD-10-CM

## 2021-08-09 LAB
ALBUMIN SERPL-MCNC: 3.7 G/DL (ref 3.4–5)
ALP SERPL-CCNC: 95 U/L (ref 40–150)
ALT SERPL W P-5'-P-CCNC: 55 U/L (ref 0–70)
ANION GAP SERPL CALCULATED.3IONS-SCNC: <1 MMOL/L (ref 3–14)
AST SERPL W P-5'-P-CCNC: 28 U/L (ref 0–45)
BILIRUB SERPL-MCNC: 0.7 MG/DL (ref 0.2–1.3)
BUN SERPL-MCNC: 15 MG/DL (ref 7–30)
CALCIUM SERPL-MCNC: 9.6 MG/DL (ref 8.5–10.1)
CHLORIDE BLD-SCNC: 108 MMOL/L (ref 94–109)
CO2 SERPL-SCNC: 33 MMOL/L (ref 20–32)
CREAT SERPL-MCNC: 1.21 MG/DL (ref 0.66–1.25)
ERYTHROCYTE [DISTWIDTH] IN BLOOD BY AUTOMATED COUNT: 13.3 % (ref 10–15)
GFR SERPL CREATININE-BSD FRML MDRD: 64 ML/MIN/1.73M2
GLUCOSE BLD-MCNC: 89 MG/DL (ref 70–99)
HCT VFR BLD AUTO: 45.8 % (ref 40–53)
HGB BLD-MCNC: 15.5 G/DL (ref 13.3–17.7)
LIPASE SERPL-CCNC: 119 U/L (ref 73–393)
MCH RBC QN AUTO: 32.3 PG (ref 26.5–33)
MCHC RBC AUTO-ENTMCNC: 33.8 G/DL (ref 31.5–36.5)
MCV RBC AUTO: 95 FL (ref 78–100)
PLATELET # BLD AUTO: 226 10E3/UL (ref 150–450)
POTASSIUM BLD-SCNC: 4.4 MMOL/L (ref 3.4–5.3)
PROT SERPL-MCNC: 6.9 G/DL (ref 6.8–8.8)
RBC # BLD AUTO: 4.8 10E6/UL (ref 4.4–5.9)
SODIUM SERPL-SCNC: 138 MMOL/L (ref 133–144)
WBC # BLD AUTO: 10.9 10E3/UL (ref 4–11)

## 2021-08-09 PROCEDURE — 85027 COMPLETE CBC AUTOMATED: CPT | Performed by: INTERNAL MEDICINE

## 2021-08-09 PROCEDURE — 80053 COMPREHEN METABOLIC PANEL: CPT | Performed by: INTERNAL MEDICINE

## 2021-08-09 PROCEDURE — 36415 COLL VENOUS BLD VENIPUNCTURE: CPT | Performed by: INTERNAL MEDICINE

## 2021-08-09 PROCEDURE — 99214 OFFICE O/P EST MOD 30 MIN: CPT | Performed by: INTERNAL MEDICINE

## 2021-08-09 PROCEDURE — 83690 ASSAY OF LIPASE: CPT | Performed by: INTERNAL MEDICINE

## 2021-08-09 PROCEDURE — 86803 HEPATITIS C AB TEST: CPT | Performed by: INTERNAL MEDICINE

## 2021-08-09 NOTE — PROGRESS NOTES
"    Assessment & Plan     Epigastric pain  No clear source on initial labs.  Empirically we will have him try a PPI to see if this helps  Get right upper quadrant ultrasound  If all the above negative get upper endoscopy    - Comprehensive metabolic panel (BMP + Alb, Alk Phos, ALT, AST, Total. Bili, TP); Future  - CBC with platelets; Future  - Lipase; Future  - omeprazole (PRILOSEC) 20 MG DR capsule; Take 1 capsule (20 mg) by mouth daily for 28 days  - Comprehensive metabolic panel (BMP + Alb, Alk Phos, ALT, AST, Total. Bili, TP)  - CBC with platelets  - Lipase             BMI:   Estimated body mass index is 29.79 kg/m  as calculated from the following:    Height as of 2/18/21: 1.721 m (5' 7.75\").    Weight as of this encounter: 88.2 kg (194 lb 8 oz).       See Patient Instructions    No follow-ups on file.    Tha Ortega MD  [facility]    Cari Casarez is a 60 year old who presents for the following health issues     HPI     Pt has been complaining of pain underneath the rib right side for about a month now, pt says it comes and goes, and most times he hardly notices it but sometimes it can be intense, pt says its a sharp pain, and says he gets a sensation of his \"stomach feeling like its up at his throat\" pt can't sleep on his right side because he feels like he is having heart burn , and the pain will radiate through his back, pt also says he doesn't have much of an appetite as well           Review of Systems   Constitutional, HEENT, cardiovascular, pulmonary, gi and gu systems are negative, except as otherwise noted.      Objective    /72   Pulse 69   Temp 97.5  F (36.4  C) (Tympanic)   Wt 88.2 kg (194 lb 8 oz)   SpO2 98%   BMI 29.79 kg/m    Body mass index is 29.79 kg/m .  Physical Exam   GENERAL APPEARANCE: alert, no distress and over weight  HENT: normal cephalic/atraumatic  NECK: no adenopathy, no asymmetry, masses, or scars and thyroid normal to " palpation  RESP: lungs clear to auscultation - no rales, rhonchi or wheezes  CV: regular rates and rhythm  ABDOMEN: Slight epigastric tenderness with palpation.  Otherwise normal abdominal exam    Office Visit on 02/18/2021   Component Date Value Ref Range Status     COLOGUARD-ABSTRACT 03/03/2021 Negative   Final     Results for orders placed or performed in visit on 08/09/21   Comprehensive metabolic panel (BMP + Alb, Alk Phos, ALT, AST, Total. Bili, TP)     Status: Abnormal   Result Value Ref Range    Sodium 138 133 - 144 mmol/L    Potassium 4.4 3.4 - 5.3 mmol/L    Chloride 108 94 - 109 mmol/L    Carbon Dioxide (CO2) 33 (H) 20 - 32 mmol/L    Anion Gap <1 (L) 3 - 14 mmol/L    Urea Nitrogen 15 7 - 30 mg/dL    Creatinine 1.21 0.66 - 1.25 mg/dL    Calcium 9.6 8.5 - 10.1 mg/dL    Glucose 89 70 - 99 mg/dL    Alkaline Phosphatase 95 40 - 150 U/L    AST 28 0 - 45 U/L    ALT 55 0 - 70 U/L    Protein Total 6.9 6.8 - 8.8 g/dL    Albumin 3.7 3.4 - 5.0 g/dL    Bilirubin Total 0.7 0.2 - 1.3 mg/dL    GFR Estimate 64 >60 mL/min/1.73m2   CBC with platelets     Status: Normal   Result Value Ref Range    WBC Count 10.9 4.0 - 11.0 10e3/uL    RBC Count 4.80 4.40 - 5.90 10e6/uL    Hemoglobin 15.5 13.3 - 17.7 g/dL    Hematocrit 45.8 40.0 - 53.0 %    MCV 95 78 - 100 fL    MCH 32.3 26.5 - 33.0 pg    MCHC 33.8 31.5 - 36.5 g/dL    RDW 13.3 10.0 - 15.0 %    Platelet Count 226 150 - 450 10e3/uL

## 2021-08-10 LAB — HCV AB SERPL QL IA: NONREACTIVE

## 2021-08-17 ENCOUNTER — HOSPITAL ENCOUNTER (OUTPATIENT)
Dept: ULTRASOUND IMAGING | Facility: CLINIC | Age: 61
Discharge: HOME | End: 2021-08-17
Attending: INTERNAL MEDICINE | Admitting: INTERNAL MEDICINE
Payer: COMMERCIAL

## 2021-08-17 DIAGNOSIS — R10.13 EPIGASTRIC PAIN: ICD-10-CM

## 2021-08-17 PROCEDURE — 76705 ECHO EXAM OF ABDOMEN: CPT

## 2021-08-29 DIAGNOSIS — R10.13 EPIGASTRIC PAIN: ICD-10-CM

## 2021-09-05 ENCOUNTER — HEALTH MAINTENANCE LETTER (OUTPATIENT)
Age: 61
End: 2021-09-05

## 2021-09-16 ENCOUNTER — LAB (OUTPATIENT)
Dept: URGENT CARE | Facility: URGENT CARE | Age: 61
End: 2021-09-16
Attending: INTERNAL MEDICINE
Payer: COMMERCIAL

## 2021-09-16 ENCOUNTER — VIRTUAL VISIT (OUTPATIENT)
Dept: INTERNAL MEDICINE | Facility: CLINIC | Age: 61
End: 2021-09-16
Payer: COMMERCIAL

## 2021-09-16 DIAGNOSIS — R05.9 COUGH: ICD-10-CM

## 2021-09-16 DIAGNOSIS — R05.9 COUGH: Primary | ICD-10-CM

## 2021-09-16 PROCEDURE — 99213 OFFICE O/P EST LOW 20 MIN: CPT | Mod: GT | Performed by: INTERNAL MEDICINE

## 2021-09-16 PROCEDURE — U0005 INFEC AGEN DETEC AMPLI PROBE: HCPCS

## 2021-09-16 PROCEDURE — U0003 INFECTIOUS AGENT DETECTION BY NUCLEIC ACID (DNA OR RNA); SEVERE ACUTE RESPIRATORY SYNDROME CORONAVIRUS 2 (SARS-COV-2) (CORONAVIRUS DISEASE [COVID-19]), AMPLIFIED PROBE TECHNIQUE, MAKING USE OF HIGH THROUGHPUT TECHNOLOGIES AS DESCRIBED BY CMS-2020-01-R: HCPCS

## 2021-09-16 RX ORDER — BENZONATATE 200 MG/1
200 CAPSULE ORAL 3 TIMES DAILY PRN
Qty: 30 CAPSULE | Refills: 0 | Status: SHIPPED | OUTPATIENT
Start: 2021-09-16 | End: 2022-04-22

## 2021-09-16 NOTE — PROGRESS NOTES
"Hermelindo is a 61 year old who is being evaluated via a billable video visit.      How would you like to obtain your AVS? MyChart  If the video visit is dropped, the invitation should be resent by: Send to e-mail at: [email or web address]  Will anyone else be joining your video visit? No      Video Start Time: 1:33 PM    Assessment & Plan     Cough  exclude covid though likely more adenovirus or ismilar  - Symptomatic COVID-19 Virus (Coronavirus) by PCR Nose; Future  - benzonatate (TESSALON) 200 MG capsule; Take 1 capsule (200 mg) by mouth 3 times daily as needed for cough             BMI:   Estimated body mass index is 29.79 kg/m  as calculated from the following:    Height as of 2/18/21: 1.721 m (5' 7.75\").    Weight as of 8/9/21: 88.2 kg (194 lb 8 oz).   Weight management plan: Discussed healthy diet and exercise guidelines    See Patient Instructions    No follow-ups on file.    Tha Ortega MD  [facility]    Subjective   Hermelindo is a 61 year old who presents for the following health issues   HPI     Acute Illness  Acute illness concerns: Cough  Onset/Duration: 2+ weeks  Symptoms:  Fever: no  Chills/Sweats: no  Headache (location?): YES  Sinus Pressure: YES  Conjunctivitis:  no  Ear Pain: YES: bilateral  Rhinorrhea: YES- slight  Congestion: YES  Sore Throat: YES- dry throat  Cough: YES-productive of clear/white sputum, worsening over time  Wheeze: no  Decreased Appetite: YES  Nausea: no  Vomiting: no  Diarrhea: YES- one episode yesterday  Dysuria/Freq.: no  Dysuria or Hematuria: no  Fatigue/Achiness: YES  Sick/Strep Exposure: YES- cold going through the whole family  Therapies tried and outcome: cough medicine, ibuprofen, with some improvement in sx, but not significantly        Review of Systems         Objective           Vitals:  No vitals were obtained today due to virtual visit.    Physical Exam   GENERAL: alert and no distress  EYES: Eyes grossly normal to inspection. "  No discharge or erythema, or obvious scleral/conjunctival abnormalities.  RESP: No audible wheeze, cough, or visible cyanosis.  No visible retractions or increased work of breathing.                  Video-Visit Details    Type of service:  Video Visit    Video End Time:1:49 PM    Originating Location (pt. Location): Home    Distant Location (provider location):  [facility]     Platform used for Video Visit: [employer]

## 2021-09-17 LAB — SARS-COV-2 RNA RESP QL NAA+PROBE: NEGATIVE

## 2021-09-28 ENCOUNTER — MYC MEDICAL ADVICE (OUTPATIENT)
Dept: INTERNAL MEDICINE | Facility: CLINIC | Age: 61
End: 2021-09-28

## 2021-09-29 NOTE — TELEPHONE ENCOUNTER
Patient called to report his cough has not improved at all since visit on 9/16, he has had this cough with occasional sinus pressure for a month. No fever no change in symptoms since office visit.     Informed we generally can not prescribe antibiotics for cough but patient would like Dr. Ortega to be asked.    Is there a cough medication he can try?    Devang Zamarripa RN

## 2021-09-30 ENCOUNTER — APPOINTMENT (OUTPATIENT)
Dept: URGENT CARE | Facility: URGENT CARE | Age: 61
End: 2021-09-30
Payer: COMMERCIAL

## 2021-09-30 NOTE — TELEPHONE ENCOUNTER
Pt called following on providers advised to be seen. He was transferred to triage since [facility] providers don't have any available appointments. Pt was advised to see UC today. He verbalized agreement with plan.

## 2021-10-07 ENCOUNTER — ANCILLARY PROCEDURE (OUTPATIENT)
Dept: GENERAL RADIOLOGY | Facility: CLINIC | Age: 61
End: 2021-10-07
Attending: PHYSICIAN ASSISTANT
Payer: COMMERCIAL

## 2021-10-07 ENCOUNTER — OFFICE VISIT (OUTPATIENT)
Dept: INTERNAL MEDICINE | Facility: CLINIC | Age: 61
End: 2021-10-07
Payer: COMMERCIAL

## 2021-10-07 VITALS
HEIGHT: 68 IN | WEIGHT: 203 LBS | BODY MASS INDEX: 30.77 KG/M2 | HEART RATE: 75 BPM | SYSTOLIC BLOOD PRESSURE: 118 MMHG | OXYGEN SATURATION: 96 % | DIASTOLIC BLOOD PRESSURE: 74 MMHG | TEMPERATURE: 96.9 F

## 2021-10-07 DIAGNOSIS — J01.10 ACUTE NON-RECURRENT FRONTAL SINUSITIS: Primary | ICD-10-CM

## 2021-10-07 DIAGNOSIS — R05.9 COUGH: ICD-10-CM

## 2021-10-07 PROCEDURE — 71046 X-RAY EXAM CHEST 2 VIEWS: CPT | Performed by: RADIOLOGY

## 2021-10-07 PROCEDURE — 99213 OFFICE O/P EST LOW 20 MIN: CPT | Performed by: PHYSICIAN ASSISTANT

## 2021-10-07 ASSESSMENT — MIFFLIN-ST. JEOR: SCORE: 1682.36

## 2021-10-07 NOTE — PROGRESS NOTES
"    Assessment & Plan     Acute non-recurrent frontal sinusitis  - mild sinus tenderness on exam, trial of antibiotics below   - follow up if symptoms worsen or fail to improve   - amoxicillin-clavulanate (AUGMENTIN) 875-125 MG tablet; Take 1 tablet by mouth 2 times daily for 7 days    Cough  - cough >1 month, CXR ordered to r/o other cause   - XR Chest 2 Views      No follow-ups on file.    MARCO A [facility] LELO Casarez is a 61 year old who presents for the following health issues     HPI     Acute Illness  Acute illness concerns: continuing cough  Onset/Duration: 2 months (since end of August)  Symptoms:  Fever: unknown-not checked  Chills/Sweats: no  Headache (location?): YES-dull pressure around head  Sinus Pressure: YES  Conjunctivitis:  no  Ear Pain: no  Rhinorrhea: no  Congestion: yes-chest  Sore Throat: no  Cough: YES-productive of clear sputum, barking- chronic in am-feels scratchy feeling upper mid chest- leads to cough  No chest pain or shortness of breath   Wheeze: no  Decreased Appetite: YES  Nausea: no  Vomiting: no  Diarrhea: no  Dysuria/Freq.: no  Dysuria or Hematuria: no  Fatigue/Achiness: YES  Sick/Strep Exposure: YES-family   Therapies tried and outcome: benzonatate cough med-cough drops            Objective    /74   Pulse 75   Temp 96.9  F (36.1  C) (Tympanic)   Ht 1.715 m (5' 7.5\")   Wt 92.1 kg (203 lb)   SpO2 96%   BMI 31.33 kg/m    Body mass index is 31.33 kg/m .  Physical Exam   GENERAL: healthy, alert and no distress  HENT: normal cephalic/atraumatic, ear canals and TM's normal, nose and mouth without ulcers or lesions, oropharynx clear, oral mucous membranes moist and sinuses: maxillary, frontal tenderness   RESP: lungs clear to auscultation - no rales, rhonchi or wheezes  CV: regular rates and rhythm, normal S1 S2, no S3 or S4 and no murmur, click or rub            "

## 2021-10-14 DIAGNOSIS — G47.33 OBSTRUCTIVE SLEEP APNEA (ADULT) (PEDIATRIC): Primary | ICD-10-CM

## 2021-12-08 ENCOUNTER — TELEPHONE (OUTPATIENT)
Dept: SLEEP MEDICINE | Facility: CLINIC | Age: 61
End: 2021-12-08
Payer: COMMERCIAL

## 2021-12-08 NOTE — TELEPHONE ENCOUNTER
Reason for Call:  Other prescription    Detailed comments: Pt would like a prescription for supplies. Pt does have an appt set in Feb.     Phone Number Patient can be reached at: Cell number on file:    Telephone Information:   Mobile [phone]       Best Time: any    Can we leave a detailed message on this number? YES    Call taken on 12/8/2021 at 11:23 AM by Polina Ochoa

## 2021-12-13 NOTE — TELEPHONE ENCOUNTER
Patient has an appointment scheduled with Dr. Valdivia 2/14/2022. DME orders in chart by NOT signed. Naa Green, CMA

## 2021-12-31 DIAGNOSIS — E78.5 HYPERLIPIDEMIA LDL GOAL <70: ICD-10-CM

## 2021-12-31 DIAGNOSIS — I25.10 CORONARY ARTERY DISEASE INVOLVING NATIVE CORONARY ARTERY OF NATIVE HEART WITHOUT ANGINA PECTORIS: ICD-10-CM

## 2021-12-31 RX ORDER — EZETIMIBE 10 MG/1
10 TABLET ORAL DAILY
Qty: 90 TABLET | Refills: 0 | Status: SHIPPED | OUTPATIENT
Start: 2021-12-31 | End: 2022-04-08

## 2022-02-11 DIAGNOSIS — E78.00 HYPERCHOLESTEROLEMIA: ICD-10-CM

## 2022-02-11 RX ORDER — ROSUVASTATIN CALCIUM 20 MG/1
20 TABLET, COATED ORAL DAILY
Qty: 90 TABLET | Refills: 0 | Status: SHIPPED | OUTPATIENT
Start: 2022-02-11 | End: 2022-04-22

## 2022-02-11 NOTE — TELEPHONE ENCOUNTER
[facility] Cardiology Refill Guideline reviewed.  Medication meets criteria for refill. Rosuvastatin 20mg sent to [facility].

## 2022-04-08 DIAGNOSIS — I25.10 CORONARY ARTERY DISEASE INVOLVING NATIVE CORONARY ARTERY OF NATIVE HEART WITHOUT ANGINA PECTORIS: ICD-10-CM

## 2022-04-08 DIAGNOSIS — E78.5 HYPERLIPIDEMIA LDL GOAL <70: ICD-10-CM

## 2022-04-08 DIAGNOSIS — F33.0 MAJOR DEPRESSIVE DISORDER, RECURRENT EPISODE, MILD (H): ICD-10-CM

## 2022-04-08 RX ORDER — BUPROPION HYDROCHLORIDE 150 MG/1
150 TABLET ORAL EVERY MORNING
Qty: 90 TABLET | Refills: 0 | Status: SHIPPED | OUTPATIENT
Start: 2022-04-08 | End: 2022-07-21

## 2022-04-08 RX ORDER — CITALOPRAM HYDROBROMIDE 20 MG/1
20 TABLET ORAL DAILY
Qty: 90 TABLET | Refills: 0 | Status: SHIPPED | OUTPATIENT
Start: 2022-04-08 | End: 2022-07-21

## 2022-04-08 RX ORDER — EZETIMIBE 10 MG/1
10 TABLET ORAL DAILY
Qty: 90 TABLET | Refills: 0 | Status: SHIPPED | OUTPATIENT
Start: 2022-04-08 | End: 2022-04-22

## 2022-04-08 NOTE — TELEPHONE ENCOUNTER
[facility] Cardiology Refill Guideline reviewed.  Medication meets criteria for refill. Ezetimibe 10mg sent to [facility].

## 2022-04-08 NOTE — TELEPHONE ENCOUNTER
Routing refill request to provider for review/approval because:  PHQ-9 score:    PHQ 2/18/2021   PHQ-9 Total Score 3   Q9: Thoughts of better off dead/self-harm past 2 weeks Not at all     No visit last 6 months      Devang Zamarripa, RN  [facility] Triage Nurse

## 2022-04-11 ENCOUNTER — VIRTUAL VISIT (OUTPATIENT)
Dept: SLEEP MEDICINE | Facility: CLINIC | Age: 62
End: 2022-04-11
Payer: COMMERCIAL

## 2022-04-11 VITALS — WEIGHT: 195 LBS | BODY MASS INDEX: 29.55 KG/M2 | HEIGHT: 68 IN

## 2022-04-11 DIAGNOSIS — G47.33 OSA ON CPAP: Primary | ICD-10-CM

## 2022-04-11 DIAGNOSIS — E78.5 HYPERLIPIDEMIA LDL GOAL <70: ICD-10-CM

## 2022-04-11 DIAGNOSIS — I25.10 CORONARY ARTERY DISEASE INVOLVING NATIVE CORONARY ARTERY OF NATIVE HEART WITHOUT ANGINA PECTORIS: Primary | ICD-10-CM

## 2022-04-11 PROCEDURE — 99213 OFFICE O/P EST LOW 20 MIN: CPT | Mod: 95 | Performed by: INTERNAL MEDICINE

## 2022-04-11 ASSESSMENT — SLEEP AND FATIGUE QUESTIONNAIRES
HOW LIKELY ARE YOU TO NOD OFF OR FALL ASLEEP WHILE SITTING INACTIVE IN A PUBLIC PLACE: WOULD NEVER DOZE
HOW LIKELY ARE YOU TO NOD OFF OR FALL ASLEEP WHILE LYING DOWN TO REST IN THE AFTERNOON WHEN CIRCUMSTANCES PERMIT: HIGH CHANCE OF DOZING
HOW LIKELY ARE YOU TO NOD OFF OR FALL ASLEEP WHILE SITTING AND TALKING TO SOMEONE: WOULD NEVER DOZE
HOW LIKELY ARE YOU TO NOD OFF OR FALL ASLEEP WHILE WATCHING TV: SLIGHT CHANCE OF DOZING
HOW LIKELY ARE YOU TO NOD OFF OR FALL ASLEEP WHILE SITTING QUIETLY AFTER LUNCH WITHOUT ALCOHOL: HIGH CHANCE OF DOZING
HOW LIKELY ARE YOU TO NOD OFF OR FALL ASLEEP WHEN YOU ARE A PASSENGER IN A CAR FOR AN HOUR WITHOUT A BREAK: MODERATE CHANCE OF DOZING
HOW LIKELY ARE YOU TO NOD OFF OR FALL ASLEEP IN A CAR, WHILE STOPPED FOR A FEW MINUTES IN TRAFFIC: WOULD NEVER DOZE
HOW LIKELY ARE YOU TO NOD OFF OR FALL ASLEEP WHILE SITTING AND READING: MODERATE CHANCE OF DOZING

## 2022-04-11 ASSESSMENT — PAIN SCALES - GENERAL: PAINLEVEL: NO PAIN (0)

## 2022-04-11 NOTE — PROGRESS NOTES
Hermelindo is a 61 year old who is being evaluated via a billable video visit.      How would you like to obtain your AVS? MyChart  If the video visit is dropped, the invitation should be resent by: Send to e-mail at: [email or web address]  Will anyone else be joining your video visit? Kinza Rolon      Video Start Time: 208pm  Video-Visit Details    Type of service:  Video Visit    Video End Time:220pm  Originating Location (pt. Location): Home    Distant Location (provider location):  [facility] SLEEP [facility]     Platform used for Video Visit: [facility]     Sleep clinic follow up visit note    Date on this visit: 4/11/2022    Primary Physician: Tha Ortega     Chief complaint: Follow-up of KIMBERLY, review CPAP compliance     HPI: Hermelindo Duffy is a 61 yr old male with history of hypertension, hyperlipidemia, CAD, moderate KIMBERLY ( AHI= 23.5 events per hour) and sleep associated hypoxemia diagnosed during HST obtained on1/16/2020. He was prescribed auto CPAP with pressure settings between 5-15 cm H2O. He was  set up at [facility] on February 14, 2020 with  a Resmed AirSense 10 Auto with pressures set at 5-15 cm H2O.  Video visit has been scheduled today for f/u KIMBERLY/review CPAP compliance.     He reports using CPAP regularly during sleep.  Sometimes when he wakes up during the middle of the night he does not put the mask back on.  He uses a FFM  He reports that the headgear has been  irritating the skin on his forehead.  Every now and then, he may snore with CPAP, but  Usually does not snore  Denies reports of apneas with CPAP use  Pressure settings feel comfortable  Denies gasping/choking with CPAP  Sleep quality better with device use  Reports feeling rested upon awakening from sleep  Denies fatigue/EDS   He has a habit of taking nap in afternoon 1/2-1 hr that has not changed with CPAP use  He denies drowsiness while driving    CPAP compliance  download:  ResMed   Auto-PAP 5.0 - 15.0 cmH2O 30 day usage data   86% of days with > 4 hours of use. 3/30 days with no use.   Average use 350 minutes per day.   95%ile Leak 84.9 L/min.   CPAP 95% pressure 6.6 cm.   AHI 1.84 events per hour.     Allergies:    Allergies   Allergen Reactions     Atorvastatin      Muscle aches     Questran [Cholestyramine]      Nausea       Medications:    Current Outpatient Medications   Medication Sig Dispense Refill     aspirin 81 MG tablet Take  by mouth daily.       benzonatate (TESSALON) 200 MG capsule Take 1 capsule (200 mg) by mouth 3 times daily as needed for cough (Patient taking differently: Take 200 mg by mouth At Bedtime) 30 capsule 0     buPROPion (WELLBUTRIN XL) 150 MG 24 hr tablet Take 1 tablet (150 mg) by mouth every morning 90 tablet 0     cetirizine (ZYRTEC) 10 MG tablet        citalopram (CELEXA) 20 MG tablet Take 1 tablet (20 mg) by mouth daily 90 tablet 0     Dupilumab (DUPIXENT) 300 MG/2ML syringe Inject 300 mg Subcutaneous as needed Every other week as needed       ezetimibe (ZETIA) 10 MG tablet Take 1 tablet (10 mg) by mouth daily Appointment required for further refills 90 tablet 0     famotidine (PEPCID) 20 MG tablet Take 20 mg by mouth 2 times daily       fluticasone (FLONASE) 50 MCG/ACT spray Spray 2 sprays into both nostrils daily 1 Bottle 11     ibuprofen (ADVIL/MOTRIN) 100 MG tablet Take 100 mg by mouth as needed       rosuvastatin (CRESTOR) 20 MG tablet Take 1 tablet (20 mg) by mouth daily 90 tablet 0       Problem List:  Patient Active Problem List    Diagnosis Date Noted     Coronary artery disease s/p JIA to LAD 2010 10/06/2015     Priority: Medium     Hyperlipidemia LDL goal <70 08/07/2015     Priority: Medium     Mild major depression (H)      Priority: Medium     KIMBERLY (obstructive sleep apnea)- mild, no CPAP 01/04/2013     Priority: Medium        Past Medical/Surgical History:  Past Medical History:   Diagnosis Date     Acute duodenal ulcer with  bleeding 1980s     Coronary artery disease      Eczema      Hyperlipidemia LDL goal < 70     intolerant to high dose statins     Hypertension      Mild major depression (H)      Past Surgical History:   Procedure Laterality Date     ANGIOPLASTY  08/2010    LAD PTCA/JIA     HERNIORRHAPHY INGUINAL      right, x2       Social History:  Social History     Socioeconomic History     Marital status:      Spouse name: Not on file     Number of children: Not on file     Years of education: Not on file     Highest education level: Not on file   Occupational History     Occupation: [employer]      Comment: [employer]    Tobacco Use     Smoking status: Never Smoker     Smokeless tobacco: Never Used   Substance and Sexual Activity     Alcohol use: Yes     Alcohol/week: 7.0 standard drinks     Types: 7 Glasses of wine per week     Drug use: No     Sexual activity: Yes     Partners: Female   Other Topics Concern      Service Not Asked     Blood Transfusions Not Asked     Caffeine Concern No     Occupational Exposure Not Asked     Hobby Hazards Not Asked     Sleep Concern Yes     Comment: snoring     Stress Concern Not Asked     Weight Concern No     Special Diet No     Back Care Not Asked     Exercise Yes     Comment: Active     Bike Helmet Not Asked     Seat Belt Yes     Self-Exams Not Asked     Parent/sibling w/ CABG, MI or angioplasty before 65F 55M? No   Social History Narrative     Not on file     Social Determinants of Health     Financial Resource Strain: Not on file   Food Insecurity: Not on file   Transportation Needs: Not on file   Physical Activity: Not on file   Stress: Not on file   Social Connections: Not on file   Intimate Partner Violence: Not on file   Housing Stability: Not on file       Family History:  Family History   Problem Relation Age of Onset     Heart Disease Father         MI approx age 63     Cerebrovascular Disease Paternal Uncle      Diabetes No family hx of       "Hypertension No family hx of          Physical Examination:  Vitals: Ht 1.727 m (5' 8\")   Wt 88.5 kg (195 lb)   BMI 29.65 kg/m    BMI= Body mass index is 29.65 kg/m .      [place] Total Score 4/11/2022   Total score - [place] 11       General: No apparent distress, appropriately groomed  Head: Normocephalic, atraumatic  Chest: No cough, no audible wheezing, able to talk in full sentences  Psych: coherent speech, normal rate and volume, able to articulate logical thoughts, able   to abstract reason, no tangential thoughts, no hallucinations   or delusions  His affect is normal  Neuro:  Mental status: Alert and  Oriented X 3  Speech: normal      Impression/Plan:  Obstructive sleep apnea: Pt reports adequate compliance with PAP therapy and reports positive benefits with PAP use.   KIMBERLY is adequately controlled with Auto CPAP at the current settings per compliance DL.   Prescription provided for renewal of PAP supplies.   Interface concerns: I have communicated with [facility] medical DME to provide him with a different headgear.  Recommended him  to continue using the CPAP regularly during sleep and to use the device during the entire duration of sleep to derive maximum benefit and instructed  to get  the supplies for the PAP replaced regularly.    Encouraged him to use the device even during nap.  Patient instructed to remember to bring PAP with him if hospitalized and if anticipating procedure that requires sedation/surgery to be sure to discuss with the provider/surgeon that he has sleep apnea and uses PAP therapy.    Overweight: We discussed weight management with healthy diet, and exercise.    Patient was strongly advised to avoid driving, operating any heavy machinery or other hazardous situations while drowsy or sleepy.  Patient was counseled on the importance of driving while alert, to pull over if drowsy, or nap before getting into the vehicle if sleepy.      Plan is to follow up in 1 year or sooner if any " "concerns.     CC: No ref. provider found      The above note was dictated using voice recognition software. Although reviewed after completion, some word and grammatical error may remain . Please contact the author for any clarifications.    \"I spent a total of 25  minutes with Hermelindo Duffy during today's video visit. Most of this time was spent counseling the patient and  coordinating care regarding  KIMBERLY, CPAP treatment, interface concerns, weight management , going over PAP download, chart review  including documentation and further activities as noted above.\"      Thai Heller MD  [facility] [address] [id number] [phone]  Dept: [phone]                "

## 2022-04-12 ENCOUNTER — TELEPHONE (OUTPATIENT)
Dept: SLEEP MEDICINE | Facility: CLINIC | Age: 62
End: 2022-04-12
Payer: COMMERCIAL

## 2022-04-17 ENCOUNTER — HEALTH MAINTENANCE LETTER (OUTPATIENT)
Age: 62
End: 2022-04-17

## 2022-04-22 ENCOUNTER — LAB (OUTPATIENT)
Dept: LAB | Facility: CLINIC | Age: 62
End: 2022-04-22
Payer: COMMERCIAL

## 2022-04-22 ENCOUNTER — OFFICE VISIT (OUTPATIENT)
Dept: CARDIOLOGY | Facility: CLINIC | Age: 62
End: 2022-04-22
Payer: COMMERCIAL

## 2022-04-22 VITALS
DIASTOLIC BLOOD PRESSURE: 84 MMHG | WEIGHT: 195.2 LBS | HEART RATE: 80 BPM | BODY MASS INDEX: 29.58 KG/M2 | SYSTOLIC BLOOD PRESSURE: 132 MMHG | HEIGHT: 68 IN

## 2022-04-22 DIAGNOSIS — I25.10 CORONARY ARTERY DISEASE INVOLVING NATIVE CORONARY ARTERY OF NATIVE HEART WITHOUT ANGINA PECTORIS: Primary | ICD-10-CM

## 2022-04-22 DIAGNOSIS — E78.5 HYPERLIPIDEMIA LDL GOAL <70: ICD-10-CM

## 2022-04-22 DIAGNOSIS — E78.00 HYPERCHOLESTEROLEMIA: ICD-10-CM

## 2022-04-22 DIAGNOSIS — I25.10 CORONARY ARTERY DISEASE INVOLVING NATIVE CORONARY ARTERY OF NATIVE HEART WITHOUT ANGINA PECTORIS: ICD-10-CM

## 2022-04-22 DIAGNOSIS — G47.33 OSA (OBSTRUCTIVE SLEEP APNEA): ICD-10-CM

## 2022-04-22 LAB
ALT SERPL W P-5'-P-CCNC: 57 U/L (ref 0–70)
ANION GAP SERPL CALCULATED.3IONS-SCNC: 4 MMOL/L (ref 3–14)
BUN SERPL-MCNC: 15 MG/DL (ref 7–30)
CALCIUM SERPL-MCNC: 8.7 MG/DL (ref 8.5–10.1)
CHLORIDE BLD-SCNC: 108 MMOL/L (ref 94–109)
CHOLEST SERPL-MCNC: 150 MG/DL
CO2 SERPL-SCNC: 26 MMOL/L (ref 20–32)
CREAT SERPL-MCNC: 1.21 MG/DL (ref 0.66–1.25)
FASTING STATUS PATIENT QL REPORTED: YES
GFR SERPL CREATININE-BSD FRML MDRD: 67 ML/MIN/1.73M2
GLUCOSE BLD-MCNC: 127 MG/DL (ref 70–99)
HDLC SERPL-MCNC: 46 MG/DL
LDLC SERPL CALC-MCNC: 78 MG/DL
NONHDLC SERPL-MCNC: 104 MG/DL
POTASSIUM BLD-SCNC: 4.2 MMOL/L (ref 3.4–5.3)
SODIUM SERPL-SCNC: 138 MMOL/L (ref 133–144)
TRIGL SERPL-MCNC: 128 MG/DL

## 2022-04-22 PROCEDURE — 80061 LIPID PANEL: CPT | Performed by: INTERNAL MEDICINE

## 2022-04-22 PROCEDURE — 99214 OFFICE O/P EST MOD 30 MIN: CPT | Performed by: INTERNAL MEDICINE

## 2022-04-22 PROCEDURE — 80048 BASIC METABOLIC PNL TOTAL CA: CPT | Performed by: INTERNAL MEDICINE

## 2022-04-22 PROCEDURE — 84460 ALANINE AMINO (ALT) (SGPT): CPT | Performed by: INTERNAL MEDICINE

## 2022-04-22 PROCEDURE — 36415 COLL VENOUS BLD VENIPUNCTURE: CPT | Performed by: INTERNAL MEDICINE

## 2022-04-22 RX ORDER — EZETIMIBE 10 MG/1
10 TABLET ORAL DAILY
Qty: 90 TABLET | Refills: 3 | Status: SHIPPED | OUTPATIENT
Start: 2022-04-22 | End: 2023-05-16

## 2022-04-22 RX ORDER — ROSUVASTATIN CALCIUM 20 MG/1
20 TABLET, COATED ORAL DAILY
Qty: 90 TABLET | Refills: 3 | Status: SHIPPED | OUTPATIENT
Start: 2022-04-22 | End: 2023-06-15

## 2022-04-22 RX ORDER — DOXYCYCLINE 100 MG/1
CAPSULE ORAL DAILY
COMMUNITY
Start: 2021-12-14 | End: 2023-02-10

## 2022-04-22 NOTE — PATIENT INSTRUCTIONS
It was a pleasure seeing you today and thank you for allowing me to be a part of your health care team.  Should you have any questions regarding your visit or future needs please feel free to reach out to my care team for assistance.      Thank you, Dr. Sumit Carranza        **Nursing: [phone]       **Scheduling: [phone]

## 2022-04-22 NOTE — PROGRESS NOTES
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  He underwent stenting of the proximal LAD in 2010 after developing chest discomfort symptoms.  Fortunately, he has not had any recurrent coronary artery disease problems since then.  He developed some statin intolerance primarily with atorvastatin, which caused muscle pains.  We were able to get him on a moderate dose of rosuvastatin 20 mg per day and added Zetia 10 mg a day for additional LDL control.  His LDL numbers look pretty good now.  He was down below 70, although up again this year to an LDL of 78.  HDL is 46.  Triglycerides are 128.  His basic metabolic panel is normal and ALT is normal.  He is not having any muscle pains currently.    He has an active work life as a  but has not been doing any regular exercise.  However, his weight is down a few pounds and he is feeling well without any chest pain or shortness of breath issues.    PHYSICAL EXAMINATION:  On examination today, his blood pressure is 132/84, heart rate 80 and weight 195 pounds.  His lungs are clear.  Heart rhythm is regular.  He has no cardiac murmurs or carotid bruits.    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  Since then, he has been doing well, and I believe he is on the highest tolerated dose of cholesterol lowering medication with an LDL of 78.  If we wanted to lower his LDL further, we would have to switch him to a PCSK9 inhibitor, which would likely be prohibitively expensive.  At this point, I think he can probably make some improvements in his cholesterol with diet and exercise, and we have talked about him doing that.  I think his medication is optimized.  I will continue  to have him follow up with me as long as he wishes to do so, and I will see him back again in 1 year.  He felt that visiting with me was reassuring, and I am happy to provide that service.    Sumit Carranza MD    cc:  Tha Ortega MD  [address] [address]    Sumit Carranza MD, Merged with [facility]        D: 2022   T: 2022   MT:     Name:     HUBERT MIMSPia  MRN:      [id number]        Account:      [id number]   :      1960           Service Date: 2022       Document: [id number]

## 2022-04-22 NOTE — LETTER
4/22/2022    Tha Ortega MD  [address]  [facility] [address]    RE: Hermelindo Duffy       Dear Colleague,     I had the pleasure of seeing Hermelindo Duffy in the [facility].  HPI and Plan:   See dictation    Today's clinic visit entailed:  Review of the result(s) of each unique test - bmp, flp, alt  Ordering of each unique test  Prescription drug management  30 minutes spent on the date of the encounter doing chart review, review of test results, patient visit and documentation   Provider  Link to MDM Help Grid     The level of medical decision making during this visit was of moderate complexity.      Orders Placed This Encounter   Procedures     Basic metabolic panel     Lipid Profile     ALT     Follow-Up with Cardiology       Orders Placed This Encounter   Medications     doxycycline monohydrate (MONODOX) 100 MG capsule     Sig: daily     omeprazole (PRILOSEC) 20 MG DR capsule     Sig: TAKE 1 CAPSULE (20 MG) BY MOUTH DAILY FOR 28 DAYS     ezetimibe (ZETIA) 10 MG tablet     Sig: Take 1 tablet (10 mg) by mouth daily Appointment required for further refills     Dispense:  90 tablet     Refill:  3     rosuvastatin (CRESTOR) 20 MG tablet     Sig: Take 1 tablet (20 mg) by mouth daily     Dispense:  90 tablet     Refill:  3       Medications Discontinued During This Encounter   Medication Reason     benzonatate (TESSALON) 200 MG capsule      famotidine (PEPCID) 20 MG tablet      rosuvastatin (CRESTOR) 20 MG tablet Reorder     ezetimibe (ZETIA) 10 MG tablet Reorder         Encounter Diagnoses   Name Primary?     Coronary artery disease involving native coronary artery of native heart without angina pectoris Yes     KIMBERLY (obstructive sleep apnea)- mild, no CPAP      Hyperlipidemia LDL goal <70      Hypercholesterolemia        CURRENT MEDICATIONS:  Current Outpatient Medications   Medication Sig Dispense Refill     aspirin 81 MG tablet Take  by mouth daily.       buPROPion  (WELLBUTRIN XL) 150 MG 24 hr tablet Take 1 tablet (150 mg) by mouth every morning 90 tablet 0     cetirizine (ZYRTEC) 10 MG tablet daily as needed       citalopram (CELEXA) 20 MG tablet Take 1 tablet (20 mg) by mouth daily 90 tablet 0     doxycycline monohydrate (MONODOX) 100 MG capsule daily       Dupilumab (DUPIXENT) 300 MG/2ML syringe Inject 300 mg Subcutaneous as needed Every other week as needed       ezetimibe (ZETIA) 10 MG tablet Take 1 tablet (10 mg) by mouth daily Appointment required for further refills 90 tablet 3     fluticasone (FLONASE) 50 MCG/ACT spray Spray 2 sprays into both nostrils daily 1 Bottle 11     ibuprofen (ADVIL/MOTRIN) 100 MG tablet Take 100 mg by mouth as needed       omeprazole (PRILOSEC) 20 MG DR capsule TAKE 1 CAPSULE (20 MG) BY MOUTH DAILY FOR 28 DAYS       rosuvastatin (CRESTOR) 20 MG tablet Take 1 tablet (20 mg) by mouth daily 90 tablet 3       ALLERGIES     Allergies   Allergen Reactions     Atorvastatin      Muscle aches     Questran [Cholestyramine]      Nausea       PAST MEDICAL HISTORY:  Past Medical History:   Diagnosis Date     Acute duodenal ulcer with bleeding 1980s     Coronary artery disease      Eczema      Hyperlipidemia LDL goal < 70     intolerant to high dose statins     Hypertension      Mild major depression (H)        PAST SURGICAL HISTORY:  Past Surgical History:   Procedure Laterality Date     ANGIOPLASTY  08/2010    LAD PTCA/JIA     HERNIORRHAPHY INGUINAL      right, x2       FAMILY HISTORY:  Family History   Problem Relation Age of Onset     Heart Disease Father         MI approx age 63     Cerebrovascular Disease Paternal Uncle      Diabetes No family hx of      Hypertension No family hx of        SOCIAL HISTORY:  Social History     Socioeconomic History     Marital status:      Spouse name: None     Number of children: None     Years of education: None     Highest education level: None   Occupational History     Occupation: [employer]       "Comment: [employer]    Tobacco Use     Smoking status: Never Smoker     Smokeless tobacco: Never Used   Substance and Sexual Activity     Alcohol use: Yes     Alcohol/week: 7.0 standard drinks     Types: 7 Glasses of wine per week     Comment: 1-2 glasses wine day     Drug use: No     Sexual activity: Yes     Partners: Female   Other Topics Concern     Caffeine Concern No     Sleep Concern Yes     Comment: snoring     Weight Concern No     Special Diet No     Exercise Yes     Comment: Active     Seat Belt Yes     Parent/sibling w/ CABG, MI or angioplasty before 65F 55M? No       Review of Systems:  Skin:          Eyes:  Positive for glasses    ENT:  Negative      Respiratory:  Positive for sleep apnea;CPAP     Cardiovascular:  Negative;palpitations;chest pain;lightheadedness;dizziness;syncope or near-syncope;cyanosis;fatigue;exercise intolerance;edema      Gastroenterology: Positive for reflux    Genitourinary:  Negative      Musculoskeletal:  Negative      Neurologic:  Negative      Psychiatric:  Positive for depression    Heme/Lymph/Imm:  Negative      Endocrine:  Negative        Physical Exam:  Vitals: /84 (BP Location: Right arm, Cuff Size: Adult Large)   Pulse 80   Ht 1.727 m (5' 8\")   Wt 88.5 kg (195 lb 3.2 oz)   BMI 29.68 kg/m      Constitutional:           Skin:             Head:           Eyes:           Lymph:      ENT:           Neck:           Respiratory:            Cardiac:                                                           GI:           Extremities and Muscular Skeletal:                 Neurological:           Psych:           CC  No referring provider defined for this encounter.    Thank you for allowing me to participate in the care of your patient.      Sincerely,     ASHTYN VELEZ MD     [facility]  "

## 2022-04-22 NOTE — PROGRESS NOTES
HPI and Plan:   See dictation    Today's clinic visit entailed:  Review of the result(s) of each unique test - bmp, flp, alt  Ordering of each unique test  Prescription drug management  30 minutes spent on the date of the encounter doing chart review, review of test results, patient visit and documentation   Provider  Link to [facility] Help Grid     The level of medical decision making during this visit was of moderate complexity.      Orders Placed This Encounter   Procedures     Basic metabolic panel     Lipid Profile     ALT     Follow-Up with Cardiology       Orders Placed This Encounter   Medications     doxycycline monohydrate (MONODOX) 100 MG capsule     Sig: daily     omeprazole (PRILOSEC) 20 MG DR capsule     Sig: TAKE 1 CAPSULE (20 MG) BY MOUTH DAILY FOR 28 DAYS     ezetimibe (ZETIA) 10 MG tablet     Sig: Take 1 tablet (10 mg) by mouth daily Appointment required for further refills     Dispense:  90 tablet     Refill:  3     rosuvastatin (CRESTOR) 20 MG tablet     Sig: Take 1 tablet (20 mg) by mouth daily     Dispense:  90 tablet     Refill:  3       Medications Discontinued During This Encounter   Medication Reason     benzonatate (TESSALON) 200 MG capsule      famotidine (PEPCID) 20 MG tablet      rosuvastatin (CRESTOR) 20 MG tablet Reorder     ezetimibe (ZETIA) 10 MG tablet Reorder         Encounter Diagnoses   Name Primary?     Coronary artery disease involving native coronary artery of native heart without angina pectoris Yes     KIMBERLY (obstructive sleep apnea)- mild, no CPAP      Hyperlipidemia LDL goal <70      Hypercholesterolemia        CURRENT MEDICATIONS:  Current Outpatient Medications   Medication Sig Dispense Refill     aspirin 81 MG tablet Take  by mouth daily.       buPROPion (WELLBUTRIN XL) 150 MG 24 hr tablet Take 1 tablet (150 mg) by mouth every morning 90 tablet 0     cetirizine (ZYRTEC) 10 MG tablet daily as needed       citalopram (CELEXA) 20 MG tablet Take 1 tablet (20 mg) by mouth daily 90  tablet 0     doxycycline monohydrate (MONODOX) 100 MG capsule daily       Dupilumab (DUPIXENT) 300 MG/2ML syringe Inject 300 mg Subcutaneous as needed Every other week as needed       ezetimibe (ZETIA) 10 MG tablet Take 1 tablet (10 mg) by mouth daily Appointment required for further refills 90 tablet 3     fluticasone (FLONASE) 50 MCG/ACT spray Spray 2 sprays into both nostrils daily 1 Bottle 11     ibuprofen (ADVIL/MOTRIN) 100 MG tablet Take 100 mg by mouth as needed       omeprazole (PRILOSEC) 20 MG DR capsule TAKE 1 CAPSULE (20 MG) BY MOUTH DAILY FOR 28 DAYS       rosuvastatin (CRESTOR) 20 MG tablet Take 1 tablet (20 mg) by mouth daily 90 tablet 3       ALLERGIES     Allergies   Allergen Reactions     Atorvastatin      Muscle aches     Questran [Cholestyramine]      Nausea       PAST MEDICAL HISTORY:  Past Medical History:   Diagnosis Date     Acute duodenal ulcer with bleeding 1980s     Coronary artery disease      Eczema      Hyperlipidemia LDL goal < 70     intolerant to high dose statins     Hypertension      Mild major depression (H)        PAST SURGICAL HISTORY:  Past Surgical History:   Procedure Laterality Date     ANGIOPLASTY  08/2010    LAD PTCA/JIA     HERNIORRHAPHY INGUINAL      right, x2       FAMILY HISTORY:  Family History   Problem Relation Age of Onset     Heart Disease Father         MI approx age 63     Cerebrovascular Disease Paternal Uncle      Diabetes No family hx of      Hypertension No family hx of        SOCIAL HISTORY:  Social History     Socioeconomic History     Marital status:      Spouse name: None     Number of children: None     Years of education: None     Highest education level: None   Occupational History     Occupation: [employer]      Comment: [employer]    Tobacco Use     Smoking status: Never Smoker     Smokeless tobacco: Never Used   Substance and Sexual Activity     Alcohol use: Yes     Alcohol/week: 7.0 standard drinks     Types: 7 Glasses  "of wine per week     Comment: 1-2 glasses wine day     Drug use: No     Sexual activity: Yes     Partners: Female   Other Topics Concern     Caffeine Concern No     Sleep Concern Yes     Comment: snoring     Weight Concern No     Special Diet No     Exercise Yes     Comment: Active     Seat Belt Yes     Parent/sibling w/ CABG, MI or angioplasty before 65F 55M? No       Review of Systems:  Skin:          Eyes:  Positive for glasses    ENT:  Negative      Respiratory:  Positive for sleep apnea;CPAP     Cardiovascular:  Negative;palpitations;chest pain;lightheadedness;dizziness;syncope or near-syncope;cyanosis;fatigue;exercise intolerance;edema      Gastroenterology: Positive for reflux    Genitourinary:  Negative      Musculoskeletal:  Negative      Neurologic:  Negative      Psychiatric:  Positive for depression    Heme/Lymph/Imm:  Negative      Endocrine:  Negative        Physical Exam:  Vitals: /84 (BP Location: Right arm, Cuff Size: Adult Large)   Pulse 80   Ht 1.727 m (5' 8\")   Wt 88.5 kg (195 lb 3.2 oz)   BMI 29.68 kg/m      Constitutional:           Skin:             Head:           Eyes:           Lymph:      ENT:           Neck:           Respiratory:            Cardiac:                                                           GI:           Extremities and Muscular Skeletal:                 Neurological:           Psych:           CC  No referring provider defined for this encounter.              "

## 2022-05-10 ENCOUNTER — DOCUMENTATION ONLY (OUTPATIENT)
Dept: SLEEP MEDICINE | Facility: CLINIC | Age: 62
End: 2022-05-10
Payer: COMMERCIAL

## 2022-05-10 NOTE — PROGRESS NOTES
Patient came to [facility] for mask fitting appointment on May 10, 2022. Patient requested to switch masks because soreness/skin irritation . Discussed the following masks: AIRFIT/TOUCH F20, DREAMWEAR FF, F30I.  Patient selected a Resmed Mask name: AIRTOUCH F20  Full Face mask size Medium

## 2022-07-18 DIAGNOSIS — F33.0 MAJOR DEPRESSIVE DISORDER, RECURRENT EPISODE, MILD (H): ICD-10-CM

## 2022-07-21 RX ORDER — BUPROPION HYDROCHLORIDE 150 MG/1
150 TABLET ORAL EVERY MORNING
Qty: 30 TABLET | Refills: 0 | Status: SHIPPED | OUTPATIENT
Start: 2022-07-21 | End: 2022-08-02

## 2022-07-21 RX ORDER — CITALOPRAM HYDROBROMIDE 20 MG/1
20 TABLET ORAL DAILY
Qty: 30 TABLET | Refills: 0 | Status: SHIPPED | OUTPATIENT
Start: 2022-07-21 | End: 2022-08-02

## 2022-07-21 NOTE — TELEPHONE ENCOUNTER
Routing refill request to provider for review/approval because:  Failed protocol due to:  PHQ-9 score:    PHQ 2/18/2021   PHQ-9 Total Score 3   Q9: Thoughts of better off dead/self-harm past 2 weeks Not at all     Patient scheduled for an appt 8/2/22    Laura Bergeron RN

## 2022-08-02 ENCOUNTER — OFFICE VISIT (OUTPATIENT)
Dept: INTERNAL MEDICINE | Facility: CLINIC | Age: 62
End: 2022-08-02
Payer: COMMERCIAL

## 2022-08-02 VITALS
SYSTOLIC BLOOD PRESSURE: 123 MMHG | TEMPERATURE: 98.3 F | HEART RATE: 66 BPM | WEIGHT: 197 LBS | DIASTOLIC BLOOD PRESSURE: 82 MMHG | OXYGEN SATURATION: 97 % | HEIGHT: 68 IN | BODY MASS INDEX: 29.86 KG/M2

## 2022-08-02 DIAGNOSIS — R73.9 HYPERGLYCEMIA: ICD-10-CM

## 2022-08-02 DIAGNOSIS — F33.0 MAJOR DEPRESSIVE DISORDER, RECURRENT EPISODE, MILD (H): ICD-10-CM

## 2022-08-02 DIAGNOSIS — Z23 NEED FOR PNEUMOCOCCAL VACCINATION: ICD-10-CM

## 2022-08-02 DIAGNOSIS — Z00.00 ROUTINE GENERAL MEDICAL EXAMINATION AT A HEALTH CARE FACILITY: Primary | ICD-10-CM

## 2022-08-02 DIAGNOSIS — I25.10 CORONARY ARTERY DISEASE INVOLVING NATIVE CORONARY ARTERY OF NATIVE HEART WITHOUT ANGINA PECTORIS: ICD-10-CM

## 2022-08-02 DIAGNOSIS — E78.5 HYPERLIPIDEMIA LDL GOAL <70: ICD-10-CM

## 2022-08-02 DIAGNOSIS — R41.3 MEMORY DIFFICULTIES: ICD-10-CM

## 2022-08-02 DIAGNOSIS — K42.9 UMBILICAL HERNIA WITHOUT OBSTRUCTION AND WITHOUT GANGRENE: ICD-10-CM

## 2022-08-02 PROCEDURE — 99396 PREV VISIT EST AGE 40-64: CPT | Performed by: INTERNAL MEDICINE

## 2022-08-02 PROCEDURE — 99214 OFFICE O/P EST MOD 30 MIN: CPT | Mod: 25 | Performed by: INTERNAL MEDICINE

## 2022-08-02 RX ORDER — BUPROPION HYDROCHLORIDE 150 MG/1
150 TABLET ORAL EVERY MORNING
Qty: 90 TABLET | Refills: 3 | Status: SHIPPED | OUTPATIENT
Start: 2022-08-02 | End: 2023-10-02

## 2022-08-02 RX ORDER — CITALOPRAM HYDROBROMIDE 20 MG/1
20 TABLET ORAL DAILY
Qty: 90 TABLET | Refills: 3 | Status: SHIPPED | OUTPATIENT
Start: 2022-08-02 | End: 2023-10-02

## 2022-08-02 ASSESSMENT — ENCOUNTER SYMPTOMS
DIARRHEA: 0
CHILLS: 0
FEVER: 0
DYSURIA: 0
FREQUENCY: 0
PARESTHESIAS: 0
SHORTNESS OF BREATH: 0
WEAKNESS: 0
HEMATOCHEZIA: 0
COUGH: 0
DIZZINESS: 0
NAUSEA: 0
PALPITATIONS: 0
CONSTIPATION: 0
ABDOMINAL PAIN: 0
ARTHRALGIAS: 0
HEARTBURN: 0
MYALGIAS: 0
SORE THROAT: 0
HEADACHES: 0
EYE PAIN: 0
NERVOUS/ANXIOUS: 1
HEMATURIA: 0
JOINT SWELLING: 0

## 2022-08-02 ASSESSMENT — PATIENT HEALTH QUESTIONNAIRE - PHQ9
SUM OF ALL RESPONSES TO PHQ QUESTIONS 1-9: 3
SUM OF ALL RESPONSES TO PHQ QUESTIONS 1-9: 3
10. IF YOU CHECKED OFF ANY PROBLEMS, HOW DIFFICULT HAVE THESE PROBLEMS MADE IT FOR YOU TO DO YOUR WORK, TAKE CARE OF THINGS AT HOME, OR GET ALONG WITH OTHER PEOPLE: NOT DIFFICULT AT ALL

## 2022-08-02 NOTE — PROGRESS NOTES
SUBJECTIVE:   CC: Hermelindo Duffy is an 61 year old male who presents for preventative health visit.     Patient has been advised of split billing requirements and indicates understanding: Yes  Healthy Habits:     Getting at least 3 servings of Calcium per day:  NO    Bi-annual eye exam:  Yes    Dental care twice a year:  NO    Sleep apnea or symptoms of sleep apnea:  Sleep apnea    Diet:  Regular (no restrictions)    Frequency of exercise:  None    Taking medications regularly:  Yes    Medication side effects:  None    PHQ-2 Total Score: 1    Additional concerns today:  Yes    ? Memory - noticing split second issues with some recall  Umbilical hernia       Today's PHQ-2 Score:   PHQ-2 ( 1999 [employer]) 8/2/2022   Q1: Little interest or pleasure in doing things 1   Q2: Feeling down, depressed or hopeless 0   PHQ-2 Score 1   PHQ-2 Total Score (12-17 Years)- Positive if 3 or more points; Administer PHQ-A if positive -   Q1: Little interest or pleasure in doing things Several days   Q2: Feeling down, depressed or hopeless Not at all   PHQ-2 Score 1       Abuse: Current or Past(Physical, Sexual or Emotional)- No  Do you feel safe in your environment? Yes        Social History     Tobacco Use     Smoking status: Never Smoker     Smokeless tobacco: Never Used   Substance Use Topics     Alcohol use: Yes     Alcohol/week: 7.0 standard drinks     Types: 7 Glasses of wine per week     Comment: 1-2 glasses wine day     If you drink alcohol do you typically have >3 drinks per day or >7 drinks per week? Yes      Alcohol Use 8/2/2022   Prescreen: >3 drinks/day or >7 drinks/week? Yes   Prescreen: >3 drinks/day or >7 drinks/week? -       Last PSA: No results found for: PSA    Reviewed orders with patient. Reviewed health maintenance and updated orders accordingly - Yes  Labs reviewed in EPIC    Reviewed and updated as needed this visit by clinical staff   Tobacco  Allergies  Meds              Cognitive testing  Table, leader,  "season    Reviewed and updated as needed this visit by Provider                       Review of Systems   Constitutional: Negative for chills and fever.   HENT: Negative for congestion, ear pain, hearing loss and sore throat.    Eyes: Negative for pain and visual disturbance.   Respiratory: Negative for cough and shortness of breath.    Cardiovascular: Negative for chest pain, palpitations and peripheral edema.   Gastrointestinal: Negative for abdominal pain, constipation, diarrhea, heartburn, hematochezia and nausea.   Genitourinary: Negative for dysuria, frequency, genital sores, hematuria, impotence, penile discharge and urgency.   Musculoskeletal: Negative for arthralgias, joint swelling and myalgias.   Skin: Negative for rash.   Neurological: Negative for dizziness, weakness, headaches and paresthesias.   Psychiatric/Behavioral: Positive for mood changes. The patient is nervous/anxious.      OBJECTIVE:   /82   Pulse 66   Temp 98.3  F (36.8  C)   Ht 1.727 m (5' 8\")   Wt 89.4 kg (197 lb)   SpO2 97%   BMI 29.95 kg/m      Physical Exam  GENERAL: alert, no distress and over weight  EYES: Eyes grossly normal to inspection, PERRL and conjunctivae and sclerae normal  HENT: ear canals and TM's normal, nose and mouth without ulcers or lesions  NECK: no adenopathy, no asymmetry, masses, or scars and thyroid normal to palpation  RESP: lungs clear to auscultation - no rales, rhonchi or wheezes  CV: regular rate and rhythm, normal S1 S2, no S3 or S4, no murmur, click or rub, no peripheral edema and peripheral pulses strong  ABDOMEN: soft, nontender, no hepatosplenomegaly, no masses and bowel sounds normal  MS: no gross musculoskeletal defects noted, no edema  SKIN: no suspicious lesions or rashes  NEURO: Normal strength and tone, mentation intact and speech normal  PSYCH: mentation appears normal, affect normal/bright    Labs reviewed in Epic    ASSESSMENT/PLAN:       ICD-10-CM    1. Routine general medical " "examination at a health care facility  Z00.00    2. Hyperlipidemia LDL goal <70  E78.5    3. Coronary artery disease involving native coronary artery of native heart without angina pectoris  I25.10    4. Umbilical hernia without obstruction and without gangrene  K42.9 Adult General Surg Referral   5. Memory difficulties  R41.3    6. Hyperglycemia  R73.9 Glucose     Hemoglobin A1c   7. Major depressive disorder, recurrent episode, mild (H)  F33.0 buPROPion (WELLBUTRIN XL) 150 MG 24 hr tablet     citalopram (CELEXA) 20 MG tablet   8. Need for pneumococcal vaccination  Z23 CANCELED: Pneumococcal 20 Valent Conjugate (Prevnar 20)     -Updated screening, immunizations, prevention.  Please see health maintenance list, care gaps  -cont secondary prev CAD  -cont antidepressants  -memory testing today normal- might look at neuropsych testing if sx persist or worsen    Patient has been advised of split billing requirements and indicates understanding: Yes    COUNSELING:   Reviewed preventive health counseling, as reflected in patient instructions    Estimated body mass index is 29.95 kg/m  as calculated from the following:    Height as of this encounter: 1.727 m (5' 8\").    Weight as of this encounter: 89.4 kg (197 lb).     Weight management plan: Discussed healthy diet and exercise guidelines    He reports that he has never smoked. He has never used smokeless tobacco.      Counseling Resources:  ATP IV Guidelines  Pooled Cohorts Equation Calculator  FRAX Risk Assessment  ICSI Preventive Guidelines  Dietary Guidelines for Americans, 2010  USDA's MyPlate  ASA Prophylaxis  Lung CA Screening    Tha Ortega MD  [facility]  Answers for HPI/ROS submitted by the patient on 8/2/2022  If you checked off any problems, how difficult have these problems made it for you to do your work, take care of things at home, or get along with other people?: Not difficult at all  PHQ9 TOTAL SCORE: 3      "

## 2022-08-08 ENCOUNTER — LAB (OUTPATIENT)
Dept: LAB | Facility: CLINIC | Age: 62
End: 2022-08-08
Payer: COMMERCIAL

## 2022-08-08 DIAGNOSIS — Z12.5 SCREENING FOR PROSTATE CANCER: Primary | ICD-10-CM

## 2022-08-08 DIAGNOSIS — R73.9 HYPERGLYCEMIA: ICD-10-CM

## 2022-08-08 LAB
FASTING STATUS PATIENT QL REPORTED: YES
GLUCOSE BLD-MCNC: 114 MG/DL (ref 70–99)
HBA1C MFR BLD: 6.6 % (ref 0–5.6)
PSA SERPL-MCNC: 0.74 UG/L (ref 0–4)

## 2022-08-08 PROCEDURE — 83036 HEMOGLOBIN GLYCOSYLATED A1C: CPT

## 2022-08-08 PROCEDURE — G0103 PSA SCREENING: HCPCS

## 2022-08-08 PROCEDURE — 82947 ASSAY GLUCOSE BLOOD QUANT: CPT

## 2022-08-08 PROCEDURE — 36415 COLL VENOUS BLD VENIPUNCTURE: CPT

## 2022-08-16 ENCOUNTER — TELEPHONE (OUTPATIENT)
Dept: SURGERY | Facility: CLINIC | Age: 62
End: 2022-08-16

## 2022-08-16 ENCOUNTER — OFFICE VISIT (OUTPATIENT)
Dept: SURGERY | Facility: CLINIC | Age: 62
End: 2022-08-16
Payer: COMMERCIAL

## 2022-08-16 VITALS
BODY MASS INDEX: 29.86 KG/M2 | SYSTOLIC BLOOD PRESSURE: 118 MMHG | RESPIRATION RATE: 16 BRPM | OXYGEN SATURATION: 96 % | HEART RATE: 76 BPM | DIASTOLIC BLOOD PRESSURE: 80 MMHG | HEIGHT: 68 IN | WEIGHT: 197 LBS

## 2022-08-16 DIAGNOSIS — K42.9 UMBILICAL HERNIA WITHOUT OBSTRUCTION AND WITHOUT GANGRENE: Primary | ICD-10-CM

## 2022-08-16 PROCEDURE — 99204 OFFICE O/P NEW MOD 45 MIN: CPT | Performed by: SURGERY

## 2022-08-16 NOTE — PROGRESS NOTES
"[facility]  New Patient Office Visit    Assessment:   Hermelindo Duffy is a 61 year old male with primary, reducible umbilical hernia .    Plan:    We will schedule an open umbilical hernia repair at the patient's convenience.  Will need preop H&P by his PCP.    We have discussed observation, reduction techniques and importance, incarceration and strangulation signs, symptoms and importance as well as need to seek emergency treatment.      We have had a detailed discussion regarding the nature of umbilical hernias, and that watchful waiting for small asymptomatic hernias is acceptable, but that in general they do tend to enlarge or become symptomatic over time. The patient has been experiencing bothersome symptoms and wishes to proceed with repair.  Surgery, indications, alternatives, risks, benefits, incisions, scarring, anesthesia, recovery, mesh, infection, bleeding, numbness, hernia recurrence, lifting and activity limitations after surgery.  All questions have been answered to the best of my ability.    Recommended time off lifting 20 lb:   4 wks  He has been given literature to review.     HPI:  Hermelindo Duffy is a 61 year old male who presents for evaluation of a bulge in the umbilical region.   He first noticed it many years ago and says he was born with an \"outie\". However, it has become noticably larger in the last 1-2 years.     He does have pain and states it feels sore and sometimes sharp pain when the hernia is pressed on.    Prior incarceration:  No   Nausea/vomitting/bloating:  No   Bulge/mass:  Yes    Previous herniorrhaphy:  No     Constipation: No  Cough: No  Diabetes: Yes  Current smoker: No    Heavy lifting > 20 lb: No    Past Medical History:  Past Medical History:   Diagnosis Date     Acute duodenal ulcer with bleeding 1980s     Coronary artery disease      Eczema      Hyperlipidemia LDL goal < 70     intolerant to high dose statins     Hypertension      Mild major depression (H) " "       Current Outpatient Medications   Medication     aspirin 81 MG tablet     buPROPion (WELLBUTRIN XL) 150 MG 24 hr tablet     cetirizine (ZYRTEC) 10 MG tablet     citalopram (CELEXA) 20 MG tablet     doxycycline monohydrate (MONODOX) 100 MG capsule     Dupilumab (DUPIXENT) 300 MG/2ML syringe     ezetimibe (ZETIA) 10 MG tablet     fluticasone (FLONASE) 50 MCG/ACT spray     ibuprofen (ADVIL/MOTRIN) 100 MG tablet     omeprazole (PRILOSEC) 20 MG DR capsule     rosuvastatin (CRESTOR) 20 MG tablet     No current facility-administered medications for this visit.        Past Surgical History:  Past Surgical History:   Procedure Laterality Date     ANGIOPLASTY  08/2010    LAD PTCA/JIA     HERNIORRHAPHY INGUINAL      right, x2        Social History:  Social History     Tobacco Use     Smoking status: Never Smoker     Smokeless tobacco: Never Used   Substance Use Topics     Alcohol use: Yes     Alcohol/week: 7.0 standard drinks     Types: 7 Glasses of wine per week     Comment: 1-2 glasses wine day     Drug use: No        Family History:  Family History   Problem Relation Age of Onset     Heart Disease Father         MI approx age 63     Cerebrovascular Disease Paternal Uncle      Diabetes No family hx of      Hypertension No family hx of      No Family history of bleeding or clotting disorders or reactions to anesthesia    ROS:  The 10 point review of systems is negative other than noted in the HPI and above.    PE:    Vitals - /80   Pulse 76   Resp 16   Ht 1.727 m (5' 8\")   Wt 89.4 kg (197 lb)   SpO2 96%   BMI 29.95 kg/m    BMI - Body mass index is 29.95 kg/m .  General - Well-developed, well-nourished, patient able to get up on table without difficulty.  HEENT - Mucous membranes moist.  Sclera are nonicteric.  Lymph -  No inguinal lymphadenopathy or masses   Respiratory - regular and non labored  CV - regular pulse  Abdomen - abdomen is soft without significant tenderness, masses, organomegaly or guarding, "   Hernia - Upon standing there is an obvious bulge in the umbilical region which is present spontaneously. The hernia is manually reducible. There is no overlying skin change. The defect feels to be ~ 1.5 cm.  Extremities - without edema  Psych - mood and affect are appropriate  Neurologic - alert, speech is clear, moves all extremities with good strength  Integument - without lesions, rashes, or jaundice    This note may have been created using voice recognition software. Undetected word substitutions or other errors may have occurred.     40 minutes total time spent on the date of this encounter doing: chart review, review of test results, patient visit, physical exam, education, counseling, developing plan of care, and documenting.    Violetta Eldridge MD  08/16/22 12:42 PM     Please route or send letter to:  Referring Provider

## 2022-08-16 NOTE — LETTER
"2022       Re: Hermelindo Duffy - 1960    Hermelindo Duffy is a 61 year old male with primary, reducible umbilical hernia .     Plan:    We will schedule an open umbilical hernia repair at the patient's convenience.  Will need preop H&P by his PCP.     We have discussed observation, reduction techniques and importance, incarceration and strangulation signs, symptoms and importance as well as need to seek emergency treatment.       We have had a detailed discussion regarding the nature of umbilical hernias, and that watchful waiting for small asymptomatic hernias is acceptable, but that in general they do tend to enlarge or become symptomatic over time. The patient has been experiencing bothersome symptoms and wishes to proceed with repair.  Surgery, indications, alternatives, risks, benefits, incisions, scarring, anesthesia, recovery, mesh, infection, bleeding, numbness, hernia recurrence, lifting and activity limitations after surgery.  All questions have been answered to the best of my ability.     Recommended time off lifting 20 lb:   4 wks  He has been given literature to review.      HPI:  Hermelindo Duffy is a 61 year old male who presents for evaluation of a bulge in the umbilical region. He first noticed it many years ago and says he was born with an \"outie\". However, it has become noticably larger in the last 1-2 years.      He does have pain and states it feels sore and sometimes sharp pain when the hernia is pressed on.     Prior incarceration:  No   Nausea/vomitting/bloating:  No   Bulge/mass:  Yes    Previous herniorrhaphy:  No      Constipation: No  Cough: No  Diabetes: Yes  Current smoker: No     Heavy lifting > 20 lb: No     No Family history of bleeding or clotting disorders or reactions to anesthesia     ROS:  The 10 point review of systems is negative other than noted in the HPI and above.     PE:    Vitals - /80   Pulse 76   Resp 16   Ht 1.727 m (5' 8\")   Wt 89.4 " kg (197 lb)   SpO2 96%   BMI 29.95 kg/m    BMI - Body mass index is 29.95 kg/m .  General - Well-developed, well-nourished, patient able to get up on table without difficulty.  HEENT - Mucous membranes moist.  Sclera are nonicteric.  Lymph -  No inguinal lymphadenopathy or masses   Respiratory - regular and non labored  CV - regular pulse  Abdomen - abdomen is soft without significant tenderness, masses, organomegaly or guarding,   Hernia - Upon standing there is an obvious bulge in the umbilical region which is present spontaneously. The hernia is manually reducible. There is no overlying skin change. The defect feels to be ~ 1.5 cm.  Extremities - without edema  Psych - mood and affect are appropriate  Neurologic - alert, speech is clear, moves all extremities with good strength  Integument - without lesions, rashes, or jaundice     This note may have been created using voice recognition software. Undetected word substitutions or other errors may have occurred.      40 minutes total time spent on the date of this encounter doing: chart review, review of test results, patient visit, physical exam, education, counseling, developing plan of care, and documenting.     Violetta Eldridge MD

## 2022-08-16 NOTE — TELEPHONE ENCOUNTER
Type of surgery: OPEN REPAIR UMBILICAL  HERNIA     Location of surgery: [facility]  Date and time of surgery: 9/15/2022 @ 7:30 AM   Surgeon: Violetta Eldridge MD   Pre-Op Appt Date: PATIENT TO SCHEDULE    Post-Op Appt Date: PATIENT TO SCHEDULE     Packet sent out: Yes  Pre-cert/Authorization completed:  Not Applicable  Date: 8/16/2022      OPEN REPAIR UMBILICAL  HERNIA       GENERAL 58  MIN REQ PA ASSIST MGB   NMS

## 2022-08-16 NOTE — LETTER
[facility]    [address], [address]  [place], [place] [address]  Phone [phone]  Fax [phone] [address], [address]  [facility] [address]  Phone [phone]  Fax [phone]    [email or web address]   2022       Hermelindo Duffy    RE: [id number]  : 1960    Hermelindo Duffy has been scheduled for surgery on 9/15/2022 at 7:30 AM  at [facility].  The hospital is located at [address] in [place].      Please check in at the Surgery reception desk at 5:30 AM . This is located in the back of the [facility] on the East side, just past the Emergency Room entrance.       DO NOT EAT OR DRINK ANYTHING AFTER MIDNIGHT THE NIGHT BEFORE YOUR  SURGERY.   You may have sips of clear liquids up until 2 hours before your surgery.   If you have been advised to take your medication, please do this early in the morning with just sips of clear liquid.       Hospital regulations require an updated pre-operative examination to be completed within 30 days of the procedure. This can be done by your primary care provider. Please ask them to fax documentation to [phone]. We also recommend you bring a copy with you.       During the current pandemic, a COVID-19 at home rapid antigen test is required   1-2 days before your procedure per hospital regulations. You can buy these at many pharmacy stores. Or you can order free, at-home tests at covid.gov/tests  ? If your test is negative, please take a photo of your test. Show the photo to the nurse when you come in for your procedure.  ? If your test is positive, please call our office at [phone].      You should shower before your surgery with Hibiclens or Exidine soap.  This can be found at your local pharmacy or you can pick it up from our office for free.  Please call our office if you have any questions.       You will be required to have  an Adult (friend or family member) drive you home after your surgery and arrange for an adult to stay with you until the next morning.       You will receive several calls from our staff 3-7 days prior to your scheduled procedure with further details and to answer any questions you may have.      It is sometimes necessary to adjust the surgery schedule due to emergencies and additions to the schedule.  If your surgery is affected by this, we greatly appreciate your flexibility and understanding in this matter          It is best if you call regarding post-operative questions between the hours of 8:00 am & 3:00 pm Monday-Friday, so you have access to the daytime care team that know you best.  ? Prescription refills are accepted during regular office hours only.      Please do not bring and Disability or FMLA papers to the hospital.  They need to be either faxed ([phone]), mailed or hand delivered to our office by you or a family member for completion.  ? Please allow 14 business days to complete paperwork.        If you have questions or concerns, please contact our office at [phone].

## 2022-08-25 DIAGNOSIS — R10.13 EPIGASTRIC PAIN: ICD-10-CM

## 2022-08-26 NOTE — TELEPHONE ENCOUNTER
Routing refill request to provider for review/approval because:  Medication is reported/historical  Gloria Elizondo RN

## 2022-09-02 ENCOUNTER — APPOINTMENT (OUTPATIENT)
Dept: CT IMAGING | Facility: CLINIC | Age: 62
End: 2022-09-02
Payer: COMMERCIAL

## 2022-09-02 ENCOUNTER — HOSPITAL ENCOUNTER (EMERGENCY)
Facility: CLINIC | Age: 62
Discharge: HOME | End: 2022-09-02
Attending: EMERGENCY MEDICINE | Admitting: EMERGENCY MEDICINE
Payer: COMMERCIAL

## 2022-09-02 VITALS
SYSTOLIC BLOOD PRESSURE: 129 MMHG | DIASTOLIC BLOOD PRESSURE: 79 MMHG | HEART RATE: 78 BPM | RESPIRATION RATE: 18 BRPM | OXYGEN SATURATION: 100 % | TEMPERATURE: 97.9 F
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
## 2022-09-02 DIAGNOSIS — S01.01XA LACERATION OF SCALP WITHOUT FOREIGN BODY, INITIAL ENCOUNTER: ICD-10-CM

## 2022-09-02 PROCEDURE — 99284 EMERGENCY DEPT VISIT MOD MDM: CPT | Mod: 25

## 2022-09-02 PROCEDURE — 12002 RPR S/N/AX/GEN/TRNK2.6-7.5CM: CPT

## 2022-09-02 PROCEDURE — 70450 CT HEAD/BRAIN W/O DYE: CPT

## 2022-09-02 RX ORDER — LIDOCAINE HYDROCHLORIDE AND EPINEPHRINE 10; 10 MG/ML; UG/ML
INJECTION, SOLUTION INFILTRATION; PERINEURAL
Status: DISCONTINUED
Start: 2022-09-02 | End: 2022-09-02 | Stop reason: HOSPADM

## 2022-09-02 ASSESSMENT — ENCOUNTER SYMPTOMS
WOUND: 1
ARTHRALGIAS: 0
VOMITING: 0
DIZZINESS: 0
NAUSEA: 0
HEADACHES: 0
NECK PAIN: 0

## 2022-09-02 ASSESSMENT — ACTIVITIES OF DAILY LIVING (ADL): ADLS_ACUITY_SCORE: 33

## 2022-09-02 NOTE — ED PROVIDER NOTES
ED ATTENDING PHYSICIAN NOTE:   I evaluated this patient in conjunction with Talya Maxwell PA-C  I have participated in the care of the patient and personally performed key elements of the history, exam, and medical decision making.      HPI:   Hermelindo Duffy is a 61 year old male who presents emergency department after mechanical fall from bed sustaining head injury and a laceration.  He reports he fell hard onto a hardwood floor but did not lose consciousness.  He denies any current pain.  He denies any associated symptoms.  His wife notes he is acting at baseline.  He feels he is up-to-date on tetanus.  Denies any other injuries.     EXAM:   /79   Pulse 78   Temp 97.9  F (36.6  C)   Resp 18   SpO2 100%   General: Adult male sitting upright  Eyes: PERRL, Conjunctive within normal limits.  EOMI.  HENT: No scalp tenderness or palpable hematoma.  No facial bone tenderness.  Moist mucous membranes, oropharynx clear.   Neck: No bony midline tenderness.  Normal active range of motion.  CV:  Regular rate and rhythm  Resp: Normal respiratory effort..  MSK: Ambulatory.  Moves all extremities spontaneously.  Skin: Warm and dry.  2.5 centimeter laceration of the right eyebrow.  No active bleeding.  Subcutaneous but no visible skull or galea.  No ecchymoses.  Neuro: Alert and oriented. Responds appropriately to all questions and commands. No focal findings appreciated. Normal muscle tone.  Psych: Normal mood and affect. Pleasant.     MEDICAL DECISION MAKING/ASSESSMENT AND PLAN:    Hermelindo Qiu is a 61-year-old male on baby aspirin who presents with mechanical fall, low risk aside from age, CT did not demonstrate any skull fracture or intracranial hemorrhage.  Laceration was repaired as per procedure note of Talya Maxwell, with my supervision.  The patient was asymptomatic.  Discharged home in stable condition.  Appropriate discharge instructions and return precautions discussed.     DIAGNOSIS:     ICD-10-CM     1. Fall, initial encounter  W19.XXXA    2. Laceration of scalp without foreign body, initial encounter  S01.01XA             DISPOSITION:    Discharged to home with his wife in stable condition.       9/2/2022  [facility] [place] EMERGENCY DEPT     Gem Ernst MD  09/02/22 [id number]

## 2022-09-02 NOTE — ED TRIAGE NOTES
Triage Assessment     Row Name 09/02/22 0808       Triage Assessment (Adult)    Airway WDL WDL       Respiratory WDL    Respiratory WDL WDL       Skin Circulation/Temperature WDL    Skin Circulation/Temperature WDL WDL       Cardiac WDL    Cardiac WDL WDL       Peripheral/Neurovascular WDL    Peripheral Neurovascular WDL WDL       Cognitive/Neuro/Behavioral WDL    Cognitive/Neuro/Behavioral WDL WDL

## 2022-09-02 NOTE — ED TRIAGE NOTES
Patient presents with complaints of head laceration. Patient states he rolled out of bed and hit his head on the floor. Patient denies use of blood thinners, bleeding is controlled. ABC intact without need for intervention at this time.        Triage Assessment     Row Name 09/02/22 0808       Triage Assessment (Adult)    Airway WDL WDL       Respiratory WDL    Respiratory WDL WDL       Skin Circulation/Temperature WDL    Skin Circulation/Temperature WDL WDL       Cardiac WDL    Cardiac WDL WDL       Peripheral/Neurovascular WDL    Peripheral Neurovascular WDL WDL       Cognitive/Neuro/Behavioral WDL    Cognitive/Neuro/Behavioral WDL WDL

## 2022-09-02 NOTE — DISCHARGE INSTRUCTIONS
Please go to your primary care physician in 5 to 7 days for suture removal.  Laceration care instructions per below:    Discharge Instructions  Laceration (Cut)    You were seen today for a laceration (cut).  Your provider examined your laceration for any problems such a buried foreign body (like glass, a splinter, or gravel), or injury to blood vessels, tendons, and nerves.  Your provider may have also rinsed and/or scrubbed your laceration to help prevent an infection. It may not be possible to find all problems with your laceration on the first visit; occasionally foreign bodies or a tendon injury can go undetected.    Your laceration may have been closed in one of several ways:  No closure: many wounds will heal just fine without closure.  Stitches: regular stitches that require removal.  Staples: skin staples are often used in the scalp/head.  Wound adhesive (glue): skin glue can be used for certain lacerations and doesn t require removal.  Wound strips (aka Butterfly bandages or steri-strips): these are bandages that help to close a wound.  Absorbable stitches:  dissolving  stitches that go away on their own and usually don t require removal.    A small percentage of wounds will develop an infection regardless of how well the wound is cared for. Antibiotics are generally not indicated to prevent an infection so are only given for a small number of high-risk wounds. Some lacerations are too high risk to close, and are left open to heal because closure can increase the likelihood that an infection will develop.    Remember that all lacerations, no matter how expertly repaired, will cause scarring. We consider many factors, techniques, and materials, in our efforts to provide the best possible cosmetic outcome.    Generally, every Emergency Department visit should have a follow-up clinic visit with either a primary or a specialty clinic/provider. Please follow-up as instructed by your emergency provider today.      Return to the Emergency Department right away if:  You have more redness, swelling, pain, drainage (pus), a bad smell, or red streaking from your laceration as these symptoms could indicate an infection.  You have a fever of 100.4 F or more.  You have bleeding that you cannot stop at home. If your cut starts to bleed, hold pressure on the bleeding area with a clean cloth or put pressure over the bandage.  If the bleeding does not stop after using constant pressure for 30 minutes, you should return to the Emergency Department for further treatment.  An area past the laceration is cool, pale, or blue compared with the other side, or has a slower return of color when squeezed.  Your dressing seems too tight or starts to get uncomfortable or painful. For children, signs of a problem might be irritability or restlessness.  You have loss of normal function or use of an area, such as being unable to straighten or bend a finger normally.  You have a numb area past the laceration.    Return to the Emergency Department or see your regular provider if:  The laceration starts to come open.   You have something coming out of the cut or a feeling that there is something in the laceration.  Your wound will not heal, or keeps breaking open. There can always be glass, wood, dirt or other things in any wound.  They will not always show up, even on x-rays.  If a wound does not heal, this may be why, and it is important to follow-up with your regular provider.    Home Care:  Take your dressing off in 12-24 hours, or as instructed by your provider, to check your laceration. Remove the dressing sooner if it seems too tight or painful, or if it is getting numb, tingly, or pale past the dressing.  Gently wash your laceration 1-2 times daily with clean water and mild soap. It is okay to shower or run clean water over the laceration, but do not let the laceration soak in water (no swimming).  If your laceration was closed with wound  adhesive or strips: pat it dry and leave it open to the air. For all other repairs: after you wash your laceration, or at least 2 times a day, apply antibiotic ointment (such as Neosporin  or Bacitracin ) to the laceration, then cover it with a Band-Aid  or gauze.  Keep the laceration clean. Wear gloves or other protective clothing if you are around dirt.    Follow-up for removal:  If your wound was closed with staples or regular stitches, they need to be removed according to the instructions and timeline specified by your provider today.  If your wound was closed with absorbable ( dissolving ) sutures, they should fall out, dissolve, or not be visible in about one week. If they are still visible, then they should be removed according to the instructions and timeline specified by your provider today.    Scars:  To help minimize scarring:  Wear sunscreen over the healed laceration when out in the sun.  Massage the area regularly once healed.  You may apply Vitamin E to the healed wound.  Wait. Scars improve in appearance over months and years.    If you were given a prescription for medicine here today, be sure to read all of the information (including the package insert) that comes with your prescription.  This will include important information about the medicine, its side effects, and any warnings that you need to know about.  The pharmacist who fills the prescription can provide more information and answer questions you may have about the medicine.  If you have questions or concerns that the pharmacist cannot address, please call or return to the Emergency Department.       Remember that you can always come back to the Emergency Department if you are not able to see your regular provider in the amount of time listed above, if you get any new symptoms, or if there is anything that worries you.

## 2022-09-02 NOTE — ED PROVIDER NOTES
History   Chief Complaint:  Head Laceration       HPI   Hermelindo Duffy is a 61 year old male who presents to the emergency department for evaluation of a head laceration after a fall out of bed at 5 AM.  The patient states he got strangely wrapped up in the blankets in his bed, rolled off the bed and hit his head on the hardwood floor.  He incurred head laceration in the process.  No loss of consciousness, no visual changes, no nausea or vomiting, no confusion or amnesia.  He denies any neck pain and shows me that he can range his neck in all directions.  He incurred no other injuries in this incident.  He does not take any blood thinners.  He states his tetanus is up-to-date.  On chart review, last tetanus was in 2017.    Review of Systems   Eyes: Negative for visual disturbance.   Gastrointestinal: Negative for nausea and vomiting.   Musculoskeletal: Negative for arthralgias, gait problem and neck pain.   Skin: Positive for wound.   Neurological: Negative for dizziness and headaches.   All other systems reviewed and are negative.      Allergies:  Atorvastatin  Questran [Cholestyramine]    Medications:  Doxycycline mono  Aspirin 81mg  Bupropion  Cetirizine  Citalopram  Dupilumab injection  Ezetimibe  Fluticasone  Omeprazole  Rosuvastatin     Past Medical History:     Obstructive sleep apnea  Depression  Hyperlipidemia  Coronary artery disease  Arteriosclerotic heart disease  Palpitations  Duodenal ulcer    Past Surgical History:    Inguinal hernia repair  Coronary stent placement    Family History:    Father- heart disease    Social History:  Patient presents via private vehicle  PCP: Tha Ortega       Physical Exam     Patient Vitals for the past 24 hrs:   BP Temp Pulse Resp SpO2   09/02/22 0809 129/79 97.9  F (36.6  C) 78 18 100 %       Physical Exam  General: Pleasant adult male sitting on [facility] with wife at bedside.  HENT: Patient has a 2.5 centimeter laceration over the left eyebrow    Eyes: Conjunctive and sclera clear.  PERRLA.  EOMs intact.  Neck: No C-spine midline tenderness.  Patient able to range his neck in all directions without pain elicited.  CV: Regular rate and rhythm.   Resp: Normal respiratory effort. Speaks in full sentences. No stridor or cough observed.  MSK: Moves all extremities without difficulty.   Skin: Warm and dry.  Head laceration per above.  Neuro: Awake, alert, oriented x3.  GCS 15.  Cranial nerves 2 through 12 intact.  Psych: Cooperative. Normal affect.      Emergency Department Course     Imaging:  Head CT w/o contrast   Final Result   IMPRESSION:    1. No acute intracranial abnormality.   2. Frontal scalp laceration without evidence of underlying fracture.      ASHTYN JC MD            SYSTEM ID:  [id number]        Report per radiology    Laboratory:  Labs Ordered and Resulted from Time of ED Arrival to Time of ED Departure - No data to display     Procedures    Laceration Repair      Procedure: Laceration Repair    Indication: Laceration    Consent: Verbal    Location: Left eyebrow    Length: 2.75 cm    Preparation: Irrigation with Sterile Saline.    Anesthesia/Sedation: Lidocaine with Epinephrine - 1%      Treatment/Exploration: Wound explored, no foreign bodies found     Closure: The wound was closed with five 5.0 ethilon simple interrupted sutures. Bacitracin was applied over the repaired wound.    Patient Status: The patient tolerated the procedure well: Yes. There were no complications.      Emergency Department Course:     Reviewed:  I reviewed nursing notes, vitals, past medical history and Care Everywhere    Assessments:  0905 I obtained history and examined the patient as noted above.     0940   I rechecked the patient.  I repaired his facial laceration per procedure noted above. Dr. Ernst inspected the sutures and examined the patient prior to discharge.      Disposition:  The patient was discharged to home.     Impression & Plan   Medical Decision  Making:  Hermelindo is a pleasant 61-year-old male who presented to the emergency department for evaluation after a fall out of bed early this morning and laceration to his left eyebrow per detailed HPI above.  No concerning loss of consciousness, nausea, vomiting, visual changes, amnesia, confusion, concerning headache.  GCS 15 on arrival.  Head CT negative for fracture or bleed.  I repaired the patient's laceration per procedure noted above, which he tolerated well.  He will need to return to clinic in 5 to 7 days for suture removal.  Wound care instructions were discussed at bedside.  He needs to return to the emergency department if he develops any fever, spreading redness, purulent drainage.  His tetanus is up-to-date.  He did not incur any other injuries in this incident.  He was discharged home from the emergency department in improved condition.  All questions were answered prior to discharge.      Diagnosis:    ICD-10-CM    1. Fall, initial encounter  W19.XXXA    2. Laceration of scalp without foreign body, initial encounter  S01.01XA        Scribe Disclosure:  I, Annemarie Luna, am serving as a scribe at 9:07 AM on 9/2/2022 to document services personally performed by Talya Maxwell PA-C based on my observations and the provider's statements to me.     Talya Maxwell PA-C  EPPA APC-T  I saw and evaluated this patient under attending provider Dr. Ernst.         Talya Maxwell PA-C  09/02/22 1028

## 2022-09-09 ENCOUNTER — OFFICE VISIT (OUTPATIENT)
Dept: INTERNAL MEDICINE | Facility: CLINIC | Age: 62
End: 2022-09-09
Payer: COMMERCIAL

## 2022-09-09 VITALS
DIASTOLIC BLOOD PRESSURE: 80 MMHG | WEIGHT: 195 LBS | HEIGHT: 68 IN | OXYGEN SATURATION: 96 % | SYSTOLIC BLOOD PRESSURE: 136 MMHG | TEMPERATURE: 99 F | BODY MASS INDEX: 29.55 KG/M2 | HEART RATE: 72 BPM

## 2022-09-09 DIAGNOSIS — S01.81XA FACIAL LACERATION, INITIAL ENCOUNTER: ICD-10-CM

## 2022-09-09 DIAGNOSIS — S09.90XA TRAUMATIC INJURY OF HEAD, INITIAL ENCOUNTER: Primary | ICD-10-CM

## 2022-09-09 PROCEDURE — 99212 OFFICE O/P EST SF 10 MIN: CPT | Performed by: PHYSICIAN ASSISTANT

## 2022-09-09 NOTE — PROGRESS NOTES
Assessment & Plan     Traumatic injury of head, initial encounter      Facial laceration, initial encounter  Sutures removed  Tolerated well                    Return in about 1 week (around 9/16/2022) for regular primary provider pre op.    Jessica Zaragoza PA-C  [facility] JOZEFO    Cari Casarez is a 61 year old, presenting for the following health issues:  Hospital F/U (Remove stitches )      History of Present Illness       Reason for visit:  Stitches removed  Symptom onset:  3-7 days ago  Symptom intensity:  Mild  Symptom progression:  Improving  Had these symptoms before:  No    He eats 0-1 servings of fruits and vegetables daily.He consumes 0 sweetened beverage(s) daily.He exercises with enough effort to increase his heart rate 9 or less minutes per day.  He exercises with enough effort to increase his heart rate 3 or less days per week.   He is taking medications regularly.       Emergency Department Followup:    Facility:  [facility]  Date of visit: 09/02/2022  Reason for visit: Laceration of scalp without foreign body  Current Status: Good  MEDICAL DECISION MAKING/ASSESSMENT AND PLAN:    Hermelindo Qiu is a 61-year-old male on baby aspirin who presents with mechanical fall, low risk aside from age, CT did not demonstrate any skull fracture or intracranial hemorrhage.  Laceration was repaired as per procedure note of Talya Maxwell, with my supervision.  The patient was asymptomatic.  Discharged home in stable condition.  Appropriate discharge instructions and return precautions discussed.     DIAGNOSIS:       ICD-10-CM     1. Fall, initial encounter  W19.XXXA     2. Laceration of scalp without foreign body, initial encounter  S01.01XA              Review of Systems   Constitutional, HEENT, cardiovascular, pulmonary, gi and gu systems are negative, except as otherwise noted.      Objective    /80   Pulse 72   Temp 99  F (37.2  C) (Oral)  "  Ht 1.727 m (5' 8\")   Wt 88.5 kg (195 lb)   SpO2 96%   BMI 29.65 kg/m    Body mass index is 29.65 kg/m .  Physical Exam   GENERAL: healthy, alert and no distress  NECK: no adenopathy, no asymmetry, masses, or scars and thyroid normal to palpation  RESP: lungs clear to auscultation - no rales, rhonchi or wheezes  MS: no gross musculoskeletal defects noted, no edema  SKIN: laceration left eyebrow- 5 interrupted sutures in place  No redness or drainage noted  Suture removed in the usual fashion                       "

## 2022-09-13 ENCOUNTER — OFFICE VISIT (OUTPATIENT)
Dept: INTERNAL MEDICINE | Facility: CLINIC | Age: 62
End: 2022-09-13
Payer: COMMERCIAL

## 2022-09-13 VITALS
DIASTOLIC BLOOD PRESSURE: 64 MMHG | HEIGHT: 68 IN | WEIGHT: 200 LBS | OXYGEN SATURATION: 96 % | BODY MASS INDEX: 30.31 KG/M2 | SYSTOLIC BLOOD PRESSURE: 118 MMHG | HEART RATE: 78 BPM | TEMPERATURE: 97.8 F

## 2022-09-13 DIAGNOSIS — Z01.818 PRE-OP EXAM: Primary | ICD-10-CM

## 2022-09-13 DIAGNOSIS — K42.9 UMBILICAL HERNIA WITHOUT OBSTRUCTION AND WITHOUT GANGRENE: ICD-10-CM

## 2022-09-13 DIAGNOSIS — I25.10 CORONARY ARTERY DISEASE INVOLVING NATIVE CORONARY ARTERY OF NATIVE HEART WITHOUT ANGINA PECTORIS: ICD-10-CM

## 2022-09-13 DIAGNOSIS — E11.9 TYPE 2 DIABETES MELLITUS WITHOUT COMPLICATION, WITHOUT LONG-TERM CURRENT USE OF INSULIN (H): ICD-10-CM

## 2022-09-13 PROCEDURE — 99214 OFFICE O/P EST MOD 30 MIN: CPT | Performed by: INTERNAL MEDICINE

## 2022-09-13 PROCEDURE — 93000 ELECTROCARDIOGRAM COMPLETE: CPT | Performed by: INTERNAL MEDICINE

## 2022-09-13 NOTE — PROGRESS NOTES
[address] [address]  Phone: [phone]  Primary Provider: Alexander Ortega  Pre-op Performing Provider: ALEXANDER ORTEGA      PREOPERATIVE EVALUATION:  Today's date: 9/13/2022    Hermelindo Duffy is a 62 year old male who presents for a preoperative evaluation.    Surgical Information:  Surgery/Procedure: Open Umbilical Hernia Repair  Surgery Location: [facility]  Surgeon: Dr. Eldridge  Surgery Date: 09/15/2022  Time of Surgery: 07:30am  Where patient plans to recover: At home with family  Fax number for surgical facility: Note does not need to be faxed, will be available electronically in Epic.    Type of Anesthesia Anticipated: General    Assessment & Plan     The proposed surgical procedure is considered LOW risk.    Pre-op exam  Umbilical hernia without obstruction and without gangrene  Type 2 diabetes mellitus without complication, without long-term current use of insulin (H)  Coronary artery disease involving native coronary artery of native heart without angina pectoris    Risks and Recommendations:  The patient has the following additional risks and recommendations for perioperative complications:   - No identified additional risk factors other than previously addressed    Medication Instructions:  Patient is to take all scheduled medications on the day of surgery   Do not stop ASA    RECOMMENDATION:  APPROVAL GIVEN to proceed with proposed procedure, without further diagnostic evaluation.                      Subjective     HPI related to upcoming procedure: umbilical hernia. New onset diabetes.     Preop Questions 9/13/2022   1. Have you ever had a heart attack or stroke? No MI - but ASCVD   2. Have you ever had surgery on your heart or blood vessels, such as a stent placement, a coronary artery bypass, or surgery on an artery in your head, neck, heart, or legs? YES    3. Do you have chest pain with activity? No   4. Do you have a  history of  heart failure? YES    5. Do you currently have a cold, bronchitis or symptoms of other infection? No   6. Do you have a cough, shortness of breath, or wheezing? No   7. Do you or anyone in your family have previous history of blood clots? No   8. Do you or does anyone in your family have a serious bleeding problem such as prolonged bleeding following surgeries or cuts? No   9. Have you ever had problems with anemia or been told to take iron pills? No   10. Have you had any abnormal blood loss such as black, tarry or bloody stools? No   11. Have you ever had a blood transfusion? YES    11a. Have you ever had a transfusion reaction? No   12. Are you willing to have a blood transfusion if it is medically needed before, during, or after your surgery? Yes   13. Have you or any of your relatives ever had problems with anesthesia? No   14. Do you have sleep apnea, excessive snoring or daytime drowsiness? YES    14a. Do you have a CPAP machine? Yes   15. Do you have any artifical heart valves or other implanted medical devices like a pacemaker, defibrillator, or continuous glucose monitor? No   16. Do you have artificial joints? No   17. Are you allergic to latex? No       Health Care Directive:  Patient does not have a Health Care Directive or Living Will: Advance Directive received and scanned. Click on Code in the patient header to view.    Preoperative Review of :   reviewed - no record of controlled substances prescribed.          Review of Systems  Constitutional, neuro, ENT, endocrine, pulmonary, cardiac, gastrointestinal, genitourinary, musculoskeletal, integument and psychiatric systems are negative, except as otherwise noted.    Patient Active Problem List    Diagnosis Date Noted     Coronary artery disease s/p JIA to LAD 2010 10/06/2015     Priority: Medium     Hyperlipidemia LDL goal <70 08/07/2015     Priority: Medium     Mild major depression (H)      Priority: Medium     KIMBERLY (obstructive  sleep apnea)- mild 01/04/2013     Priority: Medium      Past Medical History:   Diagnosis Date     Acute duodenal ulcer with bleeding 1980s     Coronary artery disease      Eczema      History of angina      Hyperlipidemia LDL goal < 70     intolerant to high dose statins     Mild major depression (H)      Sleep apnea      Stented coronary artery      Past Surgical History:   Procedure Laterality Date     ANGIOPLASTY  08/2010    LAD PTCA/JIA     HERNIORRHAPHY INGUINAL      right, x2     Current Outpatient Medications   Medication Sig Dispense Refill     aspirin 81 MG tablet Take  by mouth daily.       buPROPion (WELLBUTRIN XL) 150 MG 24 hr tablet Take 1 tablet (150 mg) by mouth every morning 90 tablet 3     cetirizine (ZYRTEC) 10 MG tablet daily as needed       citalopram (CELEXA) 20 MG tablet Take 1 tablet (20 mg) by mouth daily 90 tablet 3     doxycycline monohydrate (MONODOX) 100 MG capsule daily       Dupilumab (DUPIXENT) 300 MG/2ML syringe Inject 300 mg Subcutaneous every 28 (twenty-eight) days       ezetimibe (ZETIA) 10 MG tablet Take 1 tablet (10 mg) by mouth daily Appointment required for further refills 90 tablet 3     fluticasone (FLONASE) 50 MCG/ACT spray Spray 2 sprays into both nostrils daily 1 Bottle 11     ibuprofen (ADVIL/MOTRIN) 100 MG tablet Take 100 mg by mouth as needed       omeprazole (PRILOSEC) 20 MG DR capsule TAKE 1 CAPSULE (20 MG) BY MOUTH DAILY FOR 28 DAYS 84 capsule 0     rosuvastatin (CRESTOR) 20 MG tablet Take 1 tablet (20 mg) by mouth daily 90 tablet 3       Allergies   Allergen Reactions     Atorvastatin      Muscle aches     Questran [Cholestyramine]      Nausea        Social History     Tobacco Use     Smoking status: Never Smoker     Smokeless tobacco: Never Used   Substance Use Topics     Alcohol use: Yes     Alcohol/week: 7.0 standard drinks     Types: 7 Glasses of wine per week     Comment: 1-2 glasses wine day       History   Drug Use No         Objective     /64   Pulse  "78   Temp 97.8  F (36.6  C) (Temporal)   Ht 1.727 m (5' 8\")   Wt 90.7 kg (200 lb)   SpO2 96%   BMI 30.41 kg/m      Physical Exam    GENERAL APPEARANCE: healthy and over weight     EYES: EOMI,  PERRL     HENT: ear canals and TM's normal and nose and mouth without ulcers or lesions     NECK: no adenopathy, no asymmetry, masses, or scars and thyroid normal to palpation     RESP: lungs clear to auscultation - no rales, rhonchi or wheezes     CV: regular rates and rhythm, normal S1 S2, no S3 or S4 and no murmur, click or rub     ABDOMEN:  soft, nontender, no HSM or masses and bowel sounds normal     MS: extremities normal- no gross deformities noted, no evidence of inflammation in joints, FROM in all extremities.     SKIN: no suspicious lesions or rashes     NEURO: Normal strength and tone, sensory exam grossly normal, mentation intact and speech normal     PSYCH: mentation appears normal. and affect normal/bright     LYMPHATICS: No cervical adenopathy    Recent Labs   Lab Test 08/08/22  0909 04/22/22  0757 08/09/21  1455   HGB  --   --  15.5   PLT  --   --  226   NA  --  138 138   POTASSIUM  --  4.2 4.4   CR  --  1.21 1.21   A1C 6.6*  --   --         Diagnostics:  No labs were ordered during this visit.   EKG: Normal Sinus Rhythm, normal axis, normal intervals, no acute ST/T changes c/w ischemia, no LVH by voltage criteria, unchanged from previous tracings    Revised Cardiac Risk Index (RCRI):  The patient has the following serious cardiovascular risks for perioperative complications:   - Coronary Artery Disease (MI, positive stress test, angina, Qs on EKG) = 1 point     RCRI Interpretation: 1 point: Class II (low risk - 0.9% complication rate)           Signed Electronically by: Tha Ortega MD  Copy of this evaluation report is provided to requesting physician.      "

## 2022-09-13 NOTE — PATIENT INSTRUCTIONS
Preparing for Your Surgery  Getting started  A nurse will call you to review your health history and instructions. They will give you an arrival time based on your scheduled surgery time. Please be ready to share:  Your doctor's clinic name and phone number  Your medical, surgical and anesthesia history  A list of allergies and sensitivities  A list of medicines, including herbal treatments and over-the-counter drugs  Whether the patient has a legal guardian (ask how to send us the papers in advance)  Please tell us if you're pregnant--or if there's any chance you might be pregnant. Some surgeries may injure a fetus (unborn baby), so they require a pregnancy test. Surgeries that are safe for a fetus don't always need a test, and you can choose whether to have one.   If you have a child who's having surgery, please ask for a copy of Preparing for Your Child's Surgery.    Preparing for surgery  Within 30 days of surgery: Have a pre-op exam (sometimes called an H&P, or History and Physical). This can be done at a clinic or pre-operative center.  If you're having a , you may not need this exam. Talk to your care team.  At your pre-op exam, talk to your care team about all medicines you take. If you need to stop any medicines before surgery, ask when to start taking them again.  We do this for your safety. Many medicines can make you bleed too much during surgery. Some change how well surgery (anesthesia) drugs work.  Call your insurance company to let them know you're having surgery. (If you don't have insurance, call [phone].)  Call your clinic if there's any change in your health. This includes signs of a cold or flu (sore throat, runny nose, cough, rash, fever). It also includes a scrape or scratch near the surgery site.  If you have questions on the day of surgery, call your hospital or surgery center.  COVID testing  You may need to be tested for COVID-19 before having surgery. If so, we will give  you instructions.  Eating and drinking guidelines  For your safety: Unless your surgeon tells you otherwise, follow the guidelines below.  Eat and drink as usual until 8 hours before surgery. After that, no food or milk.  Drink clear liquids until 2 hours before surgery. These are liquids you can see through, like water, Gatorade and Propel Water. You may also have black coffee and tea (no cream or milk).  Nothing by mouth within 2 hours of surgery. This includes gum, candy and breath mints.  If you drink alcohol: Stop drinking it the night before surgery.  If your care team tells you to take medicine on the morning of surgery, it's okay to take it with a sip of water.  Preventing infection  Shower or bathe the night before and morning of your surgery. Follow the instructions your clinic gave you. (If no instructions, use regular soap.)  Don't shave or clip hair near your surgery site. We'll remove the hair if needed.  Don't smoke or vape the morning of surgery. You may chew nicotine gum up to 2 hours before surgery. A nicotine patch is okay.  Note: Some surgeries require you to completely quit smoking and nicotine. Check with your surgeon.  Your care team will make every effort to keep you safe from infection. We will:  Clean our hands often with soap and water (or an alcohol-based hand rub).  Clean the skin at your surgery site with a special soap that kills germs.  Give you a special gown to keep you warm. (Cold raises the risk of infection.)  Wear special hair covers, masks, gowns and gloves during surgery.  Give antibiotic medicine, if prescribed. Not all surgeries need antibiotics.  What to bring on the day of surgery  Photo ID and insurance card  Copy of your health care directive, if you have one  Glasses and hearing aides (bring cases)  You can't wear contacts during surgery  Inhaler and eye drops, if you use them (tell us about these when you arrive)  CPAP machine or breathing device, if you use them  A  few personal items, if spending the night  If you have . . .  A pacemaker, ICD (cardiac defibrillator) or other implant: Bring the ID card.  An implanted stimulator: Bring the remote control.  A legal guardian: Bring a copy of the certified (court-stamped) guardianship papers.  Please remove any jewelry, including body piercings. Leave jewelry and other valuables at home.  If you're going home the day of surgery  You must have a responsible adult drive you home. They should stay with you overnight as well.  If you don't have someone to stay with you, and you aren't safe to go home alone, we may keep you overnight. Insurance often won't pay for this.  After surgery  If it's hard to control your pain or you need more pain medicine, please call your surgeon's office.  Questions?   If you have any questions for your care team, list them here: _________________________________________________________________________________________________________________________________________________________________________ ____________________________________ ____________________________________ ____________________________________  For informational purposes only. Not to replace the advice of your health care provider. Copyright   2003, 2019 [employer]. All rights reserved. Clinically reviewed by Prisca Mcneal MD. [employer] [id number] - REV 07/21.    How to Take Your Medication Before Surgery  - Take all of your medications before surgery as usual    Bariatric weight management-  [email or web address]

## 2022-09-14 ENCOUNTER — TELEPHONE (OUTPATIENT)
Dept: INTERNAL MEDICINE | Facility: CLINIC | Age: 62
End: 2022-09-14

## 2022-09-14 NOTE — TELEPHONE ENCOUNTER
Diabetes Education Scheduling Outreach #1:    Call to patient to schedule. Left message with phone number to call to schedule.    Also sent [employer] message for second attempt. Requested patient to call to schedule.    Lakisha Glass OnCall  Diabetes and Nutrition Scheduling

## 2022-09-15 ENCOUNTER — HOSPITAL ENCOUNTER (OUTPATIENT)
Facility: CLINIC | Age: 62
Discharge: HOME | End: 2022-09-15
Attending: SURGERY | Admitting: SURGERY
Payer: COMMERCIAL

## 2022-09-15 ENCOUNTER — ANESTHESIA (OUTPATIENT)
Dept: SURGERY | Facility: CLINIC | Age: 62
End: 2022-09-15
Payer: COMMERCIAL

## 2022-09-15 ENCOUNTER — ANESTHESIA EVENT (OUTPATIENT)
Dept: SURGERY | Facility: CLINIC | Age: 62
End: 2022-09-15
Payer: COMMERCIAL

## 2022-09-15 ENCOUNTER — HOSPITAL ENCOUNTER (EMERGENCY)
Facility: CLINIC | Age: 62
Discharge: HOME | End: 2022-09-15
Attending: EMERGENCY MEDICINE | Admitting: EMERGENCY MEDICINE
Payer: COMMERCIAL

## 2022-09-15 ENCOUNTER — TELEPHONE (OUTPATIENT)
Dept: SURGERY | Facility: CLINIC | Age: 62
End: 2022-09-15

## 2022-09-15 VITALS
TEMPERATURE: 97.1 F | RESPIRATION RATE: 16 BRPM | SYSTOLIC BLOOD PRESSURE: 151 MMHG | OXYGEN SATURATION: 94 % | HEART RATE: 77 BPM | DIASTOLIC BLOOD PRESSURE: 92 MMHG

## 2022-09-15 VITALS
BODY MASS INDEX: 30.01 KG/M2 | WEIGHT: 198 LBS | DIASTOLIC BLOOD PRESSURE: 74 MMHG | SYSTOLIC BLOOD PRESSURE: 117 MMHG | HEIGHT: 68 IN | TEMPERATURE: 96.9 F | RESPIRATION RATE: 15 BRPM | OXYGEN SATURATION: 93 % | HEART RATE: 73 BPM

## 2022-09-15 DIAGNOSIS — K42.9 UMBILICAL HERNIA WITHOUT OBSTRUCTION AND WITHOUT GANGRENE: ICD-10-CM

## 2022-09-15 DIAGNOSIS — R33.8 ACUTE URINARY RETENTION: ICD-10-CM

## 2022-09-15 LAB
ALBUMIN UR-MCNC: NEGATIVE MG/DL
APPEARANCE UR: CLEAR
BILIRUB UR QL STRIP: NEGATIVE
COLOR UR AUTO: YELLOW
GLUCOSE UR STRIP-MCNC: NEGATIVE MG/DL
HGB UR QL STRIP: NEGATIVE
KETONES UR STRIP-MCNC: NEGATIVE MG/DL
LEUKOCYTE ESTERASE UR QL STRIP: NEGATIVE
MUCOUS THREADS #/AREA URNS LPF: PRESENT /LPF
NITRATE UR QL: NEGATIVE
PH UR STRIP: 6 [PH] (ref 5–7)
RBC URINE: 1 /HPF
SP GR UR STRIP: 1.02 (ref 1–1.03)
SQUAMOUS EPITHELIAL: <1 /HPF
UROBILINOGEN UR STRIP-MCNC: NORMAL MG/DL
WBC URINE: 1 /HPF

## 2022-09-15 PROCEDURE — 250N000009 HC RX 250: Performed by: NURSE ANESTHETIST, CERTIFIED REGISTERED

## 2022-09-15 PROCEDURE — 710N000012 HC RECOVERY PHASE 2, PER MINUTE: Performed by: SURGERY

## 2022-09-15 PROCEDURE — 272N000001 HC OR GENERAL SUPPLY STERILE: Performed by: SURGERY

## 2022-09-15 PROCEDURE — 258N000003 HC RX IP 258 OP 636: Performed by: ANESTHESIOLOGY

## 2022-09-15 PROCEDURE — 360N000075 HC SURGERY LEVEL 2, PER MIN: Performed by: SURGERY

## 2022-09-15 PROCEDURE — 250N000013 HC RX MED GY IP 250 OP 250 PS 637: Performed by: ANESTHESIOLOGY

## 2022-09-15 PROCEDURE — 49585 PR REPAIR UMBILICAL HERN,5+Y/O,REDUC: CPT | Performed by: SURGERY

## 2022-09-15 PROCEDURE — C1781 MESH (IMPLANTABLE): HCPCS | Performed by: SURGERY

## 2022-09-15 PROCEDURE — 710N000009 HC RECOVERY PHASE 1, LEVEL 1, PER MIN: Performed by: SURGERY

## 2022-09-15 PROCEDURE — 49585 PR REPAIR UMBILICAL HERN,5+Y/O,REDUC: CPT | Performed by: PHYSICIAN ASSISTANT

## 2022-09-15 PROCEDURE — 999N000141 HC STATISTIC PRE-PROCEDURE NURSING ASSESSMENT: Performed by: SURGERY

## 2022-09-15 PROCEDURE — 51798 US URINE CAPACITY MEASURE: CPT

## 2022-09-15 PROCEDURE — 250N000011 HC RX IP 250 OP 636: Performed by: SURGERY

## 2022-09-15 PROCEDURE — 51702 INSERT TEMP BLADDER CATH: CPT

## 2022-09-15 PROCEDURE — 258N000003 HC RX IP 258 OP 636: Performed by: NURSE ANESTHETIST, CERTIFIED REGISTERED

## 2022-09-15 PROCEDURE — 370N000017 HC ANESTHESIA TECHNICAL FEE, PER MIN: Performed by: SURGERY

## 2022-09-15 PROCEDURE — 81001 URINALYSIS AUTO W/SCOPE: CPT | Performed by: EMERGENCY MEDICINE

## 2022-09-15 PROCEDURE — 250N000011 HC RX IP 250 OP 636: Performed by: NURSE ANESTHETIST, CERTIFIED REGISTERED

## 2022-09-15 PROCEDURE — 99284 EMERGENCY DEPT VISIT MOD MDM: CPT

## 2022-09-15 PROCEDURE — 250N000009 HC RX 250: Performed by: SURGERY

## 2022-09-15 DEVICE — VENTRALEX ST HERNIA PATCH, 6.4 CM (2.5"), CIRCLE
Type: IMPLANTABLE DEVICE | Site: UMBILICAL | Status: FUNCTIONAL
Brand: VENTRALEX

## 2022-09-15 RX ORDER — ALBUTEROL SULFATE 0.83 MG/ML
2.5 SOLUTION RESPIRATORY (INHALATION) EVERY 4 HOURS PRN
Status: DISCONTINUED | OUTPATIENT
Start: 2022-09-15 | End: 2022-09-15 | Stop reason: HOSPADM

## 2022-09-15 RX ORDER — LIDOCAINE HYDROCHLORIDE 10 MG/ML
INJECTION, SOLUTION INFILTRATION; PERINEURAL PRN
Status: DISCONTINUED | OUTPATIENT
Start: 2022-09-15 | End: 2022-09-15

## 2022-09-15 RX ORDER — SODIUM CHLORIDE, SODIUM LACTATE, POTASSIUM CHLORIDE, CALCIUM CHLORIDE 600; 310; 30; 20 MG/100ML; MG/100ML; MG/100ML; MG/100ML
INJECTION, SOLUTION INTRAVENOUS CONTINUOUS
Status: DISCONTINUED | OUTPATIENT
Start: 2022-09-15 | End: 2022-09-15 | Stop reason: HOSPADM

## 2022-09-15 RX ORDER — BUPIVACAINE HYDROCHLORIDE 5 MG/ML
INJECTION, SOLUTION EPIDURAL; INTRACAUDAL PRN
Status: DISCONTINUED | OUTPATIENT
Start: 2022-09-15 | End: 2022-09-15 | Stop reason: HOSPADM

## 2022-09-15 RX ORDER — ACETAMINOPHEN 500 MG
1000 TABLET ORAL EVERY 6 HOURS PRN
COMMUNITY
Start: 2022-09-15 | End: 2023-02-10

## 2022-09-15 RX ORDER — EPHEDRINE SULFATE 50 MG/ML
INJECTION, SOLUTION INTRAVENOUS PRN
Status: DISCONTINUED | OUTPATIENT
Start: 2022-09-15 | End: 2022-09-15

## 2022-09-15 RX ORDER — PROPOFOL 10 MG/ML
INJECTION, EMULSION INTRAVENOUS PRN
Status: DISCONTINUED | OUTPATIENT
Start: 2022-09-15 | End: 2022-09-15

## 2022-09-15 RX ORDER — ACETAMINOPHEN 325 MG/1
650 TABLET ORAL
Status: DISCONTINUED | OUTPATIENT
Start: 2022-09-15 | End: 2022-09-15 | Stop reason: HOSPADM

## 2022-09-15 RX ORDER — FENTANYL CITRATE 50 UG/ML
25 INJECTION, SOLUTION INTRAMUSCULAR; INTRAVENOUS
Status: DISCONTINUED | OUTPATIENT
Start: 2022-09-15 | End: 2022-09-15 | Stop reason: HOSPADM

## 2022-09-15 RX ORDER — NEOSTIGMINE METHYLSULFATE 1 MG/ML
VIAL (ML) INJECTION PRN
Status: DISCONTINUED | OUTPATIENT
Start: 2022-09-15 | End: 2022-09-15

## 2022-09-15 RX ORDER — CEFAZOLIN SODIUM/WATER 2 G/20 ML
2 SYRINGE (ML) INTRAVENOUS
Status: COMPLETED | OUTPATIENT
Start: 2022-09-15 | End: 2022-09-15

## 2022-09-15 RX ORDER — ACETAMINOPHEN 325 MG/1
975 TABLET ORAL ONCE
Status: COMPLETED | OUTPATIENT
Start: 2022-09-15 | End: 2022-09-15

## 2022-09-15 RX ORDER — PHENYLEPHRINE HYDROCHLORIDE 10 MG/ML
INJECTION INTRAVENOUS PRN
Status: DISCONTINUED | OUTPATIENT
Start: 2022-09-15 | End: 2022-09-15

## 2022-09-15 RX ORDER — METHADONE HYDROCHLORIDE 10 MG/ML
INJECTION, SOLUTION INTRAMUSCULAR; INTRAVENOUS; SUBCUTANEOUS PRN
Status: DISCONTINUED | OUTPATIENT
Start: 2022-09-15 | End: 2022-09-15

## 2022-09-15 RX ORDER — ONDANSETRON 2 MG/ML
4 INJECTION INTRAMUSCULAR; INTRAVENOUS EVERY 30 MIN PRN
Status: DISCONTINUED | OUTPATIENT
Start: 2022-09-15 | End: 2022-09-15 | Stop reason: HOSPADM

## 2022-09-15 RX ORDER — METHADONE HYDROCHLORIDE 10 MG/ML
2 INJECTION, SOLUTION INTRAMUSCULAR; INTRAVENOUS; SUBCUTANEOUS 3 TIMES DAILY PRN
Status: DISCONTINUED | OUTPATIENT
Start: 2022-09-15 | End: 2022-09-15 | Stop reason: HOSPADM

## 2022-09-15 RX ORDER — KETOROLAC TROMETHAMINE 30 MG/ML
INJECTION, SOLUTION INTRAMUSCULAR; INTRAVENOUS PRN
Status: DISCONTINUED | OUTPATIENT
Start: 2022-09-15 | End: 2022-09-15

## 2022-09-15 RX ORDER — CEFAZOLIN SODIUM/WATER 2 G/20 ML
2 SYRINGE (ML) INTRAVENOUS SEE ADMIN INSTRUCTIONS
Status: DISCONTINUED | OUTPATIENT
Start: 2022-09-15 | End: 2022-09-15 | Stop reason: HOSPADM

## 2022-09-15 RX ORDER — ONDANSETRON 2 MG/ML
INJECTION INTRAMUSCULAR; INTRAVENOUS PRN
Status: DISCONTINUED | OUTPATIENT
Start: 2022-09-15 | End: 2022-09-15

## 2022-09-15 RX ORDER — LIDOCAINE 40 MG/G
CREAM TOPICAL
Status: DISCONTINUED | OUTPATIENT
Start: 2022-09-15 | End: 2022-09-15 | Stop reason: HOSPADM

## 2022-09-15 RX ORDER — FENTANYL CITRATE 50 UG/ML
25 INJECTION, SOLUTION INTRAMUSCULAR; INTRAVENOUS EVERY 5 MIN PRN
Status: DISCONTINUED | OUTPATIENT
Start: 2022-09-15 | End: 2022-09-15 | Stop reason: HOSPADM

## 2022-09-15 RX ORDER — GLYCOPYRROLATE 0.2 MG/ML
INJECTION, SOLUTION INTRAMUSCULAR; INTRAVENOUS PRN
Status: DISCONTINUED | OUTPATIENT
Start: 2022-09-15 | End: 2022-09-15

## 2022-09-15 RX ORDER — OXYCODONE HYDROCHLORIDE 5 MG/1
5-10 TABLET ORAL EVERY 4 HOURS PRN
Qty: 12 TABLET | Refills: 0 | Status: SHIPPED | OUTPATIENT
Start: 2022-09-15 | End: 2023-02-10

## 2022-09-15 RX ORDER — HYDRALAZINE HYDROCHLORIDE 20 MG/ML
10 INJECTION INTRAMUSCULAR; INTRAVENOUS EVERY 10 MIN PRN
Status: DISCONTINUED | OUTPATIENT
Start: 2022-09-15 | End: 2022-09-15 | Stop reason: HOSPADM

## 2022-09-15 RX ORDER — ONDANSETRON 4 MG/1
4 TABLET, ORALLY DISINTEGRATING ORAL EVERY 30 MIN PRN
Status: DISCONTINUED | OUTPATIENT
Start: 2022-09-15 | End: 2022-09-15 | Stop reason: HOSPADM

## 2022-09-15 RX ORDER — DEXAMETHASONE SODIUM PHOSPHATE 4 MG/ML
INJECTION, SOLUTION INTRA-ARTICULAR; INTRALESIONAL; INTRAMUSCULAR; INTRAVENOUS; SOFT TISSUE PRN
Status: DISCONTINUED | OUTPATIENT
Start: 2022-09-15 | End: 2022-09-15

## 2022-09-15 RX ORDER — TAMSULOSIN HYDROCHLORIDE 0.4 MG/1
0.4 CAPSULE ORAL DAILY
Qty: 10 CAPSULE | Refills: 0 | Status: SHIPPED | OUTPATIENT
Start: 2022-09-15 | End: 2022-09-25

## 2022-09-15 RX ORDER — IBUPROFEN 200 MG
600 TABLET ORAL EVERY 6 HOURS PRN
Status: ON HOLD | COMMUNITY
Start: 2022-09-15 | End: 2023-02-11

## 2022-09-15 RX ORDER — OXYCODONE HYDROCHLORIDE 5 MG/1
5 TABLET ORAL
Status: DISCONTINUED | OUTPATIENT
Start: 2022-09-15 | End: 2022-09-15 | Stop reason: HOSPADM

## 2022-09-15 RX ORDER — SODIUM CHLORIDE, SODIUM LACTATE, POTASSIUM CHLORIDE, CALCIUM CHLORIDE 600; 310; 30; 20 MG/100ML; MG/100ML; MG/100ML; MG/100ML
INJECTION, SOLUTION INTRAVENOUS CONTINUOUS PRN
Status: DISCONTINUED | OUTPATIENT
Start: 2022-09-15 | End: 2022-09-15

## 2022-09-15 RX ADMIN — PROPOFOL 150 MG: 10 INJECTION, EMULSION INTRAVENOUS at 07:40

## 2022-09-15 RX ADMIN — METHADONE HYDROCHLORIDE 10 MG: 10 INJECTION, SOLUTION INTRAMUSCULAR; INTRAVENOUS; SUBCUTANEOUS at 07:40

## 2022-09-15 RX ADMIN — GLYCOPYRROLATE 0.2 MG: 0.2 INJECTION, SOLUTION INTRAMUSCULAR; INTRAVENOUS at 07:56

## 2022-09-15 RX ADMIN — DEXMEDETOMIDINE HYDROCHLORIDE 0.8 MCG/KG/HR: 100 INJECTION, SOLUTION INTRAVENOUS at 07:40

## 2022-09-15 RX ADMIN — EPHEDRINE SULFATE 5 MG: 50 INJECTION, SOLUTION INTRAVENOUS at 08:00

## 2022-09-15 RX ADMIN — SODIUM CHLORIDE, POTASSIUM CHLORIDE, SODIUM LACTATE AND CALCIUM CHLORIDE: 600; 310; 30; 20 INJECTION, SOLUTION INTRAVENOUS at 06:50

## 2022-09-15 RX ADMIN — ONDANSETRON HYDROCHLORIDE 4 MG: 2 INJECTION, SOLUTION INTRAVENOUS at 07:56

## 2022-09-15 RX ADMIN — MIDAZOLAM 2 MG: 1 INJECTION INTRAMUSCULAR; INTRAVENOUS at 07:30

## 2022-09-15 RX ADMIN — LIDOCAINE HYDROCHLORIDE 50 MG: 10 INJECTION, SOLUTION INFILTRATION; PERINEURAL at 07:40

## 2022-09-15 RX ADMIN — ACETAMINOPHEN 975 MG: 325 TABLET, FILM COATED ORAL at 07:19

## 2022-09-15 RX ADMIN — NEOSTIGMINE METHYLSULFATE 4 MG: 1 INJECTION, SOLUTION INTRAVENOUS at 08:26

## 2022-09-15 RX ADMIN — ROCURONIUM BROMIDE 50 MG: 50 INJECTION, SOLUTION INTRAVENOUS at 07:40

## 2022-09-15 RX ADMIN — SODIUM CHLORIDE, POTASSIUM CHLORIDE, SODIUM LACTATE AND CALCIUM CHLORIDE: 600; 310; 30; 20 INJECTION, SOLUTION INTRAVENOUS at 06:19

## 2022-09-15 RX ADMIN — KETOROLAC TROMETHAMINE 15 MG: 30 INJECTION, SOLUTION INTRAMUSCULAR at 07:40

## 2022-09-15 RX ADMIN — GLYCOPYRROLATE 0.8 MG: 0.2 INJECTION, SOLUTION INTRAMUSCULAR; INTRAVENOUS at 08:26

## 2022-09-15 RX ADMIN — Medication 2 G: at 07:32

## 2022-09-15 RX ADMIN — DEXAMETHASONE SODIUM PHOSPHATE 8 MG: 4 INJECTION, SOLUTION INTRA-ARTICULAR; INTRALESIONAL; INTRAMUSCULAR; INTRAVENOUS; SOFT TISSUE at 07:40

## 2022-09-15 RX ADMIN — PHENYLEPHRINE HYDROCHLORIDE 100 MCG: 10 INJECTION INTRAVENOUS at 08:01

## 2022-09-15 ASSESSMENT — ACTIVITIES OF DAILY LIVING (ADL)
ADLS_ACUITY_SCORE: 35

## 2022-09-15 ASSESSMENT — ENCOUNTER SYMPTOMS: DIFFICULTY URINATING: 1

## 2022-09-15 NOTE — TELEPHONE ENCOUNTER
Hermelindo had umbilical hernia repair this morning by Dr Eldridge.  He reports he feels the urge to void but is unable to urinate.  Patient advised he needs to go to ER for evaluation with bladder scan and possible escobar placement.    Pt expressed understanding and will plan on coming into [facility] ER.    Marlene Fay PA-C

## 2022-09-15 NOTE — ED TRIAGE NOTES
Pt had hernia repair surgery this morning and has not been able to void since. Denies pain. Surgery was without complications.      Triage Assessment     Row Name 09/15/22 4476       Triage Assessment (Adult)    Airway WDL WDL       Respiratory WDL    Respiratory WDL WDL       Skin Circulation/Temperature WDL    Skin Circulation/Temperature WDL WDL       Cardiac WDL    Cardiac WDL WDL       Peripheral/Neurovascular WDL    Peripheral Neurovascular WDL WDL       Cognitive/Neuro/Behavioral WDL    Cognitive/Neuro/Behavioral WDL WDL

## 2022-09-15 NOTE — ANESTHESIA POSTPROCEDURE EVALUATION
Patient: Hermelindo Duffy    Procedure: Procedure(s):  OPEN UMBILICAL HERNIA REPAIR       Anesthesia Type:  General    Note:  Disposition: Outpatient   Postop Pain Control: Uneventful            Sign Out: Well controlled pain   PONV: No   Neuro/Psych: Uneventful            Sign Out: Acceptable/Baseline neuro status   Airway/Respiratory: Uneventful            Sign Out: Acceptable/Baseline resp. status   CV/Hemodynamics: Uneventful            Sign Out: Acceptable CV status   Other NRE: NONE   DID A NON-ROUTINE EVENT OCCUR? No           Last vitals:  Vitals Value Taken Time   /75 09/15/22 0920   Temp 97.4  F (36.3  C) 09/15/22 0925   Pulse 81 09/15/22 0931   Resp 11 09/15/22 0931   SpO2 95 % 09/15/22 0933   Vitals shown include unvalidated device data.    Electronically Signed By: Phoenix Joy MD  September 15, 2022  4:07 PM

## 2022-09-15 NOTE — OP NOTE
[facility]   Operative Note    Pre-operative diagnosis: Umbilical hernia without obstruction and without gangrene [K42.9]   Post-operative diagnosis Same   Procedure: Procedure(s):  OPEN UMBILICAL HERNIA REPAIR   Surgeon(s): Surgeon(s) and Role:     * Violetta Eldridge MD - Primary   Assistant:        Estimated blood loss: * Jovita Ng PA-C Olivia Gray, MS3  *The Physician Assistant was medically necessary for their expertise in prepping, camera management, suctioning, suturing and retraction.  5 ml    Specimens: None   Findings: 1.5 cm hernia defect     Indication for procedure:   This is a 62 year old male who presented to clinic for a symptomatic umbilical hernia and desired repair. We discussed hernia repair with mesh and the patient agreed to proceed.    Description of procedure:   Description of procedure:   Patient was brought to the operating room, placed on the operating table in supine position. Anesthesia was induced. Her pressure points were padded and she was secured with a safety strap. A timeout was called to verify the patient, site of procedure and procedure to be performed.    After injecting 0.5% marcaine, a 4 cm cm curvilinear infraumbilical incision was created. The subcutaneous tissue was dissected, down to the fascia with cautery. The umbilical stalk was encircled bluntly and then transected with cautery. The hernia was identified and was about 1.5 cm diameter. A 6.4 cm diameter circular piece of polypropylene mesh was then opened.     The fascia around the hernia was defined circumferentially and the hernia sac was reduced. The mesh was placed subfascial, in a preperitoneal location. Interrupted 0 vicryl was used to secure the mesh at four corners. 0 vicryl was used to close the fascia over the mesh.    Local was injected along the fascia, subcutaneous tissues and skin.   The umbilicus was reinverted using a 3-0 vicryl stitch. The skin was closed with  running 4-0 vicryl and the skin was covered with dermabond.    Sponge and needle counts were correct prior to case completion.      Violetta Eldridge MD

## 2022-09-15 NOTE — ED PROVIDER NOTES
History     Chief Complaint:  Urinary Retention      HPI   Hermelindo Duffy is a 62 year old male who presents with acute urinary retention.  Patient underwent an umbilical hernia repair this morning.  Last time he urinated was at 730 this morning.  He notes some mild lower abdominal discomfort, but nothing severe.  He denies any fever, flank pain, vomiting, other symptoms.  He is only taken Tylenol for his postsurgery discomfort.  He has never had problems with urinary retention or BPH in the past.  He does not take Flomax.  He denies any recent urinary symptoms leading up to this.    Review of Systems   Genitourinary: Positive for difficulty urinating.   All other systems reviewed and are negative.        Allergies:  Atorvastatin  Questran [Cholestyramine]    Medications:    acetaminophen (TYLENOL) 500 MG tablet  aspirin 81 MG tablet  buPROPion (WELLBUTRIN XL) 150 MG 24 hr tablet  cetirizine (ZYRTEC) 10 MG tablet  citalopram (CELEXA) 20 MG tablet  doxycycline monohydrate (MONODOX) 100 MG capsule  Dupilumab (DUPIXENT) 300 MG/2ML syringe  ezetimibe (ZETIA) 10 MG tablet  fluticasone (FLONASE) 50 MCG/ACT spray  ibuprofen (ADVIL/MOTRIN) 200 MG tablet  omeprazole (PRILOSEC) 20 MG DR capsule  oxyCODONE (ROXICODONE) 5 MG tablet  rosuvastatin (CRESTOR) 20 MG tablet         Past Medical History:   Past Surgical History:     Past Medical History:   Diagnosis Date     Acute duodenal ulcer with bleeding 1980s     Coronary artery disease      Eczema      History of angina      Hyperlipidemia LDL goal < 70      Mild major depression (H)      Sleep apnea      Stented coronary artery     Past Surgical History:   Procedure Laterality Date     ANGIOPLASTY  08/2010    LAD PTCA/JIA     HERNIORRHAPHY INGUINAL      right, x2      Patient Active Problem List    Diagnosis Date Noted     Diabetes mellitus, type 2 (H) 09/13/2022     Priority: Medium     Coronary artery disease s/p JIA to LAD 2010 10/06/2015     Priority: Medium      Hyperlipidemia LDL goal <70 08/07/2015     Priority: Medium     Mild major depression (H)      Priority: Medium     KIMBERLY (obstructive sleep apnea)- mild 01/04/2013     Priority: Medium          Family History  Family History   Problem Relation Age of Onset     Heart Disease Father         MI approx age 63     Cerebrovascular Disease Paternal Uncle      Diabetes No family hx of      Hypertension No family hx of        Social History:  PCP: Tha Ortega  Presents to the ED with wife by private vehicle    Physical Exam     Patient Vitals for the past 24 hrs:   BP Temp Temp src Pulse Resp SpO2   09/15/22 2005 -- -- -- -- -- 94 %   09/15/22 2000 (!) 151/92 -- -- 77 -- 94 %   09/15/22 1935 -- -- -- -- -- 95 %   09/15/22 1930 130/87 -- -- 69 -- 94 %   09/15/22 1925 -- -- -- -- -- 94 %   09/15/22 1920 -- -- -- 72 -- 93 %   09/15/22 1915 (!) 140/87 -- -- -- -- --   09/15/22 1613 131/82 97.1  F (36.2  C) Temporal 72 16 93 %       Physical Exam  Vitals and nursing note reviewed.   Constitutional:       General: He is not in acute distress.     Appearance: Normal appearance. He is not ill-appearing.   HENT:      Head: Normocephalic and atraumatic.      Right Ear: External ear normal.      Left Ear: External ear normal.   Eyes:      Conjunctiva/sclera: Conjunctivae normal.   Cardiovascular:      Rate and Rhythm: Normal rate and regular rhythm.      Heart sounds: No murmur heard.  Pulmonary:      Effort: Pulmonary effort is normal. No respiratory distress.      Breath sounds: No wheezing, rhonchi or rales.   Abdominal:      General: Abdomen is flat. There is no distension.      Palpations: Abdomen is soft.      Tenderness: There is abdominal tenderness (mild) in the suprapubic area. There is no guarding or rebound.       Musculoskeletal:         General: No swelling or deformity.      Cervical back: Normal range of motion and neck supple.   Skin:     General: Skin is warm and dry.      Findings: No rash.   Neurological:       Mental Status: He is alert and oriented to person, place, and time.   Psychiatric:         Behavior: Behavior normal.         Emergency Department Course     Imaging:  No orders to display       Laboratory:  Labs Ordered and Resulted from Time of ED Arrival to Time of ED Departure   ROUTINE UA WITH MICROSCOPIC REFLEX TO CULTURE - Abnormal       Result Value    Color Urine Yellow      Appearance Urine Clear      Glucose Urine Negative      Bilirubin Urine Negative      Ketones Urine Negative      Specific Gravity Urine 1.024      Blood Urine Negative      pH Urine 6.0      Protein Albumin Urine Negative      Urobilinogen Urine Normal      Nitrite Urine Negative      Leukocyte Esterase Urine Negative      Mucus Urine Present (*)     RBC Urine 1      WBC Urine 1      Squamous Epithelials Urine <1           Emergency Department Course:           Reviewed:  I reviewed nursing notes, vitals, past history and care everywhere    Interventions:  Medications - No data to display    Disposition:  The patient was discharged to home.    Impression & Plan        Medical Decision Makin-year-old male presenting with acute urinary retention.  Likely related to his anesthetic agents from the surgery.  No previous history of urinary tension in the past.  We will bladder scan to confirm retention and place Null if he is retaining.  Will start on Flomax and have him follow-up with primary care within the next week for removal of the catheter and voiding trial.    Null was placed and patient had 600 mL of urine drained.  No signs of urinary tract infection.  Will start Flomax and discharged with Null and leg bag and recommend he follow-up with his primary care physician early next week for attempted removal and voiding trial.  Discussed return precautions.    Covid-19  Hermelindo Duffy was evaluated during a global COVID-19 pandemic, which necessitated consideration that the patient might be at risk for infection with the  SARS-CoV-2 virus that causes COVID-19.  Applicable protocols for evaluation were followed during the patient's care. COVID-19 was considered as part of the patient's evaluation.       Diagnosis:    ICD-10-CM    1. Acute urinary retention  R33.8        Discharge Medications:  Discharge Medication List as of 9/15/2022  7:51 PM      START taking these medications    Details   tamsulosin (FLOMAX) 0.4 MG capsule Take 1 capsule (0.4 mg) by mouth daily for 10 doses, Disp-10 capsule, R-0, E-Prescribe                    Lloyd Gomez MD  09/16/22 3688

## 2022-09-15 NOTE — ANESTHESIA PREPROCEDURE EVALUATION
Anesthesia Pre-Procedure Evaluation    Patient: Hermelindo Duffy   MRN: [id number] : 1960        Procedure : Procedure(s):  OPEN UMBILICAL HERNIA REPAIR          Past Medical History:   Diagnosis Date     Acute duodenal ulcer with bleeding 1980s     Coronary artery disease      Eczema      History of angina      Hyperlipidemia LDL goal < 70     intolerant to high dose statins     Mild major depression (H)      Sleep apnea      Stented coronary artery       Past Surgical History:   Procedure Laterality Date     ANGIOPLASTY  2010    LAD PTCA/JIA     HERNIORRHAPHY INGUINAL      right, x2      Allergies   Allergen Reactions     Atorvastatin      Muscle aches     Questran [Cholestyramine]      Nausea      Social History     Tobacco Use     Smoking status: Never Smoker     Smokeless tobacco: Never Used   Substance Use Topics     Alcohol use: Yes     Alcohol/week: 7.0 standard drinks     Types: 7 Glasses of wine per week     Comment: 1-2 glasses wine day      Wt Readings from Last 1 Encounters:   09/15/22 89.8 kg (198 lb)        Anesthesia Evaluation            ROS/MED HX  ENT/Pulmonary:     (+) sleep apnea,     Neurologic:  - neg neurologic ROS     Cardiovascular:     (+) Dyslipidemia --CAD angina--stent-Previous cardiac testing   Echo: Date: Results:    Stress Test: Date:  Results:  Exercise was stopped due to fatigue.  There was a normal BP response to exercise.  The patient exhibited no chest pain during exercise.  The Duke treadmill score was low risk ( >5 Galvan score).  Target Heart Rate was achieved.  Arrhythmia induced during stress: occasional PAC's.  There was a maximum 0.5mm ST segment depression in the inferior lead(s).  There was a maximum 0.5mm ST segment depression in the lateral lead(s).  This was a normal stress echocardiogram with no evidence of stress-induced  ischemia.  The visual ejection fraction is estimated at 65-70%.  Normal resting wall motion and no stress-induced wall motion  abnormality.     Baseline  The patient is in normal sinus rhythm.  Normal left ventricular function and wall motion at rest.  ECG Reviewed: Date: Results:    Cath: Date: Results:      METS/Exercise Tolerance:     Hematologic:  - neg hematologic  ROS     Musculoskeletal:  - neg musculoskeletal ROS     GI/Hepatic:     (+) GERD, Asymptomatic on medication,     Renal/Genitourinary:  - neg Renal ROS     Endo:     (+) type II DM, Not using insulin, Obesity,     Psychiatric/Substance Use:     (+) psychiatric history depression     Infectious Disease:  - neg infectious disease ROS     Malignancy:       Other:            Physical Exam    Airway        Mallampati: II   TM distance: > 3 FB   Neck ROM: full   Mouth opening: > 3 cm    Respiratory Devices and Support         Dental  no notable dental history         Cardiovascular   cardiovascular exam normal          Pulmonary   pulmonary exam normal                OUTSIDE LABS:  CBC:   Lab Results   Component Value Date    WBC 10.9 08/09/2021    WBC 6.8 08/07/2015    HGB 15.5 08/09/2021    HGB 15.1 08/07/2015    HCT 45.8 08/09/2021    HCT 43.6 08/07/2015     08/09/2021     08/07/2015     BMP:   Lab Results   Component Value Date     04/22/2022     08/09/2021    POTASSIUM 4.2 04/22/2022    POTASSIUM 4.4 08/09/2021    CHLORIDE 108 04/22/2022    CHLORIDE 108 08/09/2021    CO2 26 04/22/2022    CO2 33 (H) 08/09/2021    BUN 15 04/22/2022    BUN 15 08/09/2021    CR 1.21 04/22/2022    CR 1.21 08/09/2021     (H) 08/08/2022     (H) 04/22/2022     COAGS:   Lab Results   Component Value Date    PTT 35 08/16/2010    INR 0.91 08/15/2010     POC: No results found for: BGM, HCG, HCGS  HEPATIC:   Lab Results   Component Value Date    ALBUMIN 3.7 08/09/2021    PROTTOTAL 6.9 08/09/2021    ALT 57 04/22/2022    AST 28 08/09/2021    ALKPHOS 95 08/09/2021    BILITOTAL 0.7 08/09/2021     OTHER:   Lab Results   Component Value Date    A1C 6.6 (H) 08/08/2022     FABRICIO 8.7 04/22/2022    LIPASE 119 08/09/2021    TSH 1.59 08/07/2015       Anesthesia Plan    ASA Status:  3   NPO Status:  NPO Appropriate    Anesthesia Type: General.     - Airway: ETT   Induction: Intravenous.   Maintenance: Balanced.   Techniques and Equipment:       - Drips/Meds: Dexmed. infusion     Consents    Anesthesia Plan(s) and associated risks, benefits, and realistic alternatives discussed. Questions answered and patient/representative(s) expressed understanding.    - Discussed:     - Discussed with:  Patient      - Extended Intubation/Ventilatory Support Discussed: No.      - Patient is DNR/DNI Status: No    Use of blood products discussed: No .     Postoperative Care    Pain management: IV analgesics, Oral pain medications, Multi-modal analgesia.     - Plan for long acting post-op opioid use   PONV prophylaxis: Ondansetron (or other 5HT-3), Dexamethasone or Solumedrol     Comments:                Phoenix Joy MD

## 2022-09-15 NOTE — TELEPHONE ENCOUNTER
Name of caller:Chelsie Lunsford, spouse, she is with pt.    Reason for Call:  Pt unable to urinate, feels the urge to but cannot    Surgeon:  Dr. Eldridge    Recent Surgery:  Yes.    If yes, when & what type:  9/15, OPEN UMBILICAL HERNIA REPAIR      Best phone number to reach pt at is: [phone]  Ok to leave a message with medical info? Yes.    Pharmacy preferred (if calling for a refill): NA

## 2022-09-15 NOTE — ANESTHESIA CARE TRANSFER NOTE
Patient: Hermelindo Duffy    Procedure: Procedure(s):  OPEN UMBILICAL HERNIA REPAIR       Diagnosis: Umbilical hernia without obstruction and without gangrene [K42.9]  Diagnosis Additional Information: No value filed.    Anesthesia Type:   General     Note:    Oropharynx: oropharynx clear of all foreign objects and spontaneously breathing  Level of Consciousness: awake  Oxygen Supplementation: face mask  Level of Supplemental Oxygen (L/min / FiO2): 6  Independent Airway: airway patency satisfactory and stable  Dentition: dentition unchanged  Vital Signs Stable: post-procedure vital signs reviewed and stable  Report to RN Given: handoff report given  Patient transferred to: PACU  Comments: Robust cough, no pain   Handoff Report: Identifed the Patient, Identified the Reponsible Provider, Reviewed the pertinent medical history, Discussed the surgical course, Reviewed Intra-OP anesthesia mangement and issues during anesthesia and Allowed opportunity for questions and acknowledgement of understanding      Vitals:  Vitals Value Taken Time   /90 09/15/22 0835   Temp     Pulse 85 09/15/22 0838   Resp 13 09/15/22 0838   SpO2 99 % 09/15/22 0838   Vitals shown include unvalidated device data.    Electronically Signed By: JEFFREY Chun CRNA  September 15, 2022  8:40 AM

## 2022-09-15 NOTE — DISCHARGE INSTRUCTIONS
GENERAL ANESTHESIA OR SEDATION ADULT DISCHARGE INSTRUCTIONS   SPECIAL PRECAUTIONS FOR 24 HOURS AFTER SURGERY    IT IS NOT UNUSUAL TO FEEL LIGHT-HEADED OR FAINT, UP TO 24 HOURS AFTER SURGERY OR WHILE TAKING PAIN MEDICATION.  IF YOU HAVE THESE SYMPTOMS; SIT FOR A FEW MINUTES BEFORE STANDING AND HAVE SOMEONE ASSIST YOU WHEN YOU GET UP TO WALK OR USE THE BATHROOM.    YOU SHOULD REST AND RELAX FOR THE NEXT 24 HOURS AND YOU MUST MAKE ARRANGEMENTS TO HAVE SOMEONE STAY WITH YOU FOR AT LEAST 24 HOURS AFTER YOUR DISCHARGE.  AVOID HAZARDOUS AND STRENUOUS ACTIVITIES.  DO NOT MAKE IMPORTANT DECISIONS FOR 24 HOURS.    DO NOT DRIVE ANY VEHICLE OR OPERATE MECHANICAL EQUIPMENT FOR 24 HOURS FOLLOWING THE END OF YOUR SURGERY.  EVEN THOUGH YOU MAY FEEL NORMAL, YOUR REACTIONS MAY BE AFFECTED BY THE MEDICATION YOU HAVE RECEIVED.    DO NOT DRINK ALCOHOLIC BEVERAGES FOR 24 HOURS FOLLOWING YOUR SURGERY.    DRINK CLEAR LIQUIDS (APPLE JUICE, GINGER ALE, 7-UP, BROTH, ETC.).  PROGRESS TO YOUR REGULAR DIET AS YOU FEEL ABLE.    YOU MAY HAVE A DRY MOUTH, A SORE THROAT, MUSCLES ACHES OR TROUBLE SLEEPING.  THESE SHOULD GO AWAY AFTER 24 HOURS.    CALL YOUR DOCTOR FOR ANY OF THE FOLLOWING:  SIGNS OF INFECTION (FEVER, GROWING TENDERNESS AT THE SURGERY SITE, A LARGE AMOUNT OF DRAINAGE OR BLEEDING, SEVERE PAIN, FOUL-SMELLING DRAINAGE, REDNESS OR SWELLING.    IT HAS BEEN OVER 8 TO 10 HOURS SINCE SURGERY AND YOU ARE STILL NOT ABLE TO URINATE (PASS WATER).     Maximum acetaminophen (Tylenol) dose from all sources should not exceed 4 grams (4000 mg) per day.  You had 975mg at 7:15am.  Do not take any tylenol until after 1:15pm.    HOME CARE FOLLOWING INGUINAL/FEMORAL HERNIA REPAIR  CMAILA Mendieta, STEPHANI Anand R. O'Donnell & SVITLANA Clancy    DIET:  Start with liquids and gradually resume your regular diet as tolerated.  Drink plenty of fluids.  While taking pain medications, consider use of a stool softener, increase your fiber in your  diet, or add a fiber supplement (like Metamucil, Citrucel) to help prevent constipation - a possible side effect of pain medications.    NAUSEA:  If nauseated from the anesthetic/pain meds; rest in bed, get up cautiously with assistance, and drink clear liquids (juice, tea, broth).    ACTIVITY:  Light Activity -- you may immediately be up and about as tolerated.  Walking is encouraged, increase as tolerated.  Driving/Light Work-- when comfortable and off narcotic pain medications.  Strenuous Work/Activity -- limit lifting to 20 pounds for 3 weeks.  Active Sports (running, biking, etc.) -- cautiously resume after 4 weeks.    INCISIONAL CARE:  If you have a dressing in place, keep clean and dry for 48 hours; you may replace the gauze if it becomes soiled.  After 48 hours you may remove the dressing and shower.  Do not submerse incision in water for 1 week.  If you have a Dermabond dressing (a type of skin glue), you may shower immediately.  Sutures will absorb and need not be removed.  If present, leave the steri-strips (white paper tapes) in place for 14 days after surgery.  If present, leave Dermabond glue in place until it wears/flakes off.  Do not apply lotions, creams, or ointments to incisions.  Expect a variable amount of swelling/black and blue discoloration that may involve the penis/scrotum or labia.  Some numbness around the incision is common.  A lump/ridge under the incision is normal and will gradually resolve.    DISCOMFORT:  Local anesthetic placed at surgery should provide relief for 4-8 hours.  Begin taking pain pills before discomfort is severe.  Take the pain medication with some food, when possible, to minimize side effects.  Intermittent use of ice packs to the hernia repair site may help during the first 1-3 weeks after surgery.  Expect gradual improvement.    Over-the-counter anti-inflammatory medications (i.e. Ibuprofen/Advil/Motrin or Naprosyn/Aleve) may be used per package instructions in  addition to or while tapering off the narcotic pain medications to decrease swelling and sensitivity at the repair site.  DO NOT TAKE these Anti-inflammatory medications if your primary physician has advised against doing so, or if you have acid reflux, ulcer, or bleeding disorder, or take blood-thinner medications.  Call your primary physician or the surgery office if you have medication questions.    FOLLOW-UP AFTER SURGERY:  -Our office will contact you approximately 2-3 weeks after surgery to check on your progress and answer any questions you may have.  If you are doing well, you will not need to return for an office appointment.  If any concerns are identified over the phone, we will help you make an appointment to see a provider.    -If you have not received a phone call, have any questions or concerns, or would like to be seen, please call us at [phone].  We are located at: [address], [address]; [place] [address]    -CONTACT US IF THE FOLLOWING DEVELOPS:   1. A fever that is above 101     2. Increased redness, warmth, drainage, bleeding, or swelling.   3. Pain that is not relieved by rest/ice and your prescription.   4.  Increasing pain after 48 hours.   5. Drainage that is thick, cloudy, yellow, green or white.   6. Any other questions or concerns.

## 2022-09-16 ENCOUNTER — TELEPHONE (OUTPATIENT)
Dept: UROLOGY | Facility: CLINIC | Age: 62
End: 2022-09-16

## 2022-09-16 ENCOUNTER — TELEPHONE (OUTPATIENT)
Dept: INTERNAL MEDICINE | Facility: CLINIC | Age: 62
End: 2022-09-16

## 2022-09-16 DIAGNOSIS — R33.9 URINARY RETENTION: Primary | ICD-10-CM

## 2022-09-16 NOTE — TELEPHONE ENCOUNTER
Health Call Center    Phone Message    May a detailed message be left on voicemail: yes     Reason for Call: Appointment Intake    Referring Provider Name: Tha Ortega MD  Diagnosis and/or Symptoms: urinary retention- catheter placed in ER. needs removal + PVR eval etc.    Patient is being referred for retention. Urgent appointment needed in 3-5 days.  Sending encounter per guidelines. Please review and follow-up with patient for scheduling.     Action Taken: Message routed to:  [facility] ([facility] Urology    Travel Screening: Not Applicable

## 2022-09-16 NOTE — TELEPHONE ENCOUNTER
Pt who had a urinary catherer placed at ER since unable to void post surgery. Pt questions how long he should have it and where he needs to have it removed.    Per 09/15/2022 ER notes:    Pt will start on Flomax and have him follow-up with primary care within the next week for removal of the catheter and voiding trial.    We can't remove catherer at clinic. Can provider advice if urology referral is appropriate?     Please advice. Pt would need a call back with providers response.     Ok to leave a detailed message.

## 2022-09-19 NOTE — TELEPHONE ENCOUNTER
Patient states that he did not have any appointments Friday. Just can't wait 7-10 days for catheter removal. States that he tried both [facility] and [facility]. No one can get him in to remove the catheter.    Patient requests message be sent to Dr. Ortega to see if any way can be removed at primary care clinic?  OK to use same day slot or send to ?    Can we leave a detailed message on this number? YES  Phone number patient can be reached at: Cell number on file:    Telephone Information:   Mobile [phone]       Jovita Felipe, RN  [facility] Triage

## 2022-09-19 NOTE — TELEPHONE ENCOUNTER
Pt called in regards to below - pt is wanting cath removed today - please call pt to further discuss, thanks!

## 2022-09-19 NOTE — TELEPHONE ENCOUNTER
Neither- needs to be removed by urology.  Per urology RN needs to stay in for the 7-10 days then be removed. Would have him f/u with them at that 1-2 wk period.

## 2022-09-19 NOTE — TELEPHONE ENCOUNTER
Spoke to pt. Informed pt that catheter should be left in at least 7-10 days and can be left in place up to 30 days. Unable to get pt scheduled in time frame he prefers. He denied any issues or concerns with the catheter, just that it is inconvenient to work with. Advised pt to reach out to [facility] and provided [facility] phone number. Pt verbalized understanding.      Kristy Phillips RN MSN

## 2022-09-21 ENCOUNTER — VIRTUAL VISIT (OUTPATIENT)
Dept: UROLOGY | Facility: CLINIC | Age: 62
End: 2022-09-21
Payer: COMMERCIAL

## 2022-09-21 VITALS — BODY MASS INDEX: 29.55 KG/M2 | HEIGHT: 68 IN | WEIGHT: 195 LBS

## 2022-09-21 DIAGNOSIS — R33.9 URINARY RETENTION: Primary | ICD-10-CM

## 2022-09-21 PROCEDURE — 99203 OFFICE O/P NEW LOW 30 MIN: CPT | Mod: GT | Performed by: PHYSICIAN ASSISTANT

## 2022-09-21 RX ORDER — TAMSULOSIN HYDROCHLORIDE 0.4 MG/1
0.4 CAPSULE ORAL DAILY
Qty: 14 CAPSULE | Refills: 0 | Status: SHIPPED | OUTPATIENT
Start: 2022-09-21 | End: 2023-02-10

## 2022-09-21 NOTE — PATIENT INSTRUCTIONS
Continue Flomax for 14 additional days.     Trial of void with nurse today or tomorrow. If fails, will replace Null and attempt another trial of void 7 days later. If fails again, will need cystoscopy (small camera) to evaluate the prostate.

## 2022-09-21 NOTE — PROGRESS NOTES
Pt will meet you in [place] [facility]    Hermelindo is a 62 year old who is being evaluated via a billable video visit.      How would you like to obtain your AVS? [employer]  If the video visit is dropped, the invitation should be resent by: Text to cell phone: [phone]  Will anyone else be joining your video visit? No        Video-Visit Details    Video Start Time: 12:38 PM    Type of service:  Video Visit    Video End Time:12:47 PM    Originating Location (pt. Location): Home    Distant Location (provider location):  [facility]     Platform used for Video Visit: [employer]    CC: Urinary retention.    HPI: It is a pleasure to see . Hermelindo Duffy, a pleasant 62 year old male seen today in the urology clinic in hospital follow up of urinary retention. This developed acutely requiring Null catheter placement on 9/16/22 (vol not recorded). This has been draining well with pale, yellow urine. The patient is tolerating the catheter without issue. No clots of hematuria noted. He had hernia surgery that AM and was not required to void prior to discharge.     The patient has no prior history of AUR or similar symptoms. At baseline, patient has no BPH w/ LUTS. Currently denies fevers, chills, N/V, abdominal/back pain. On Flomax x 6 days.     Past Medical History:   Diagnosis Date     Acute duodenal ulcer with bleeding 1980s     Coronary artery disease      Eczema      Hernia, abdominal      History of angina      Hyperlipidemia LDL goal < 70     intolerant to high dose statins     Mild major depression (H)      Mumps      Sleep apnea      Stented coronary artery      Past Surgical History:   Procedure Laterality Date     ANGIOPLASTY  08/01/2010    LAD PTCA/JIA     CYSTOSCOPY       HERNIORRHAPHY INGUINAL      right, x2     HERNIORRHAPHY UMBILICAL N/A 09/15/2022    Procedure: OPEN UMBILICAL HERNIA REPAIR;  Surgeon: Violetta Eldridge MD;  Location: [facility] OR     TESTICLE SURGERY       Current Outpatient  Medications   Medication Sig Dispense Refill     acetaminophen (TYLENOL) 500 MG tablet Take 2 tablets (1,000 mg) by mouth every 6 hours as needed for pain       aspirin 81 MG tablet Take  by mouth daily.       buPROPion (WELLBUTRIN XL) 150 MG 24 hr tablet Take 1 tablet (150 mg) by mouth every morning 90 tablet 3     cetirizine (ZYRTEC) 10 MG tablet daily as needed       citalopram (CELEXA) 20 MG tablet Take 1 tablet (20 mg) by mouth daily 90 tablet 3     doxycycline monohydrate (MONODOX) 100 MG capsule daily       Dupilumab (DUPIXENT) 300 MG/2ML syringe Inject 300 mg Subcutaneous every 28 (twenty-eight) days       ezetimibe (ZETIA) 10 MG tablet Take 1 tablet (10 mg) by mouth daily Appointment required for further refills 90 tablet 3     fluticasone (FLONASE) 50 MCG/ACT spray Spray 2 sprays into both nostrils daily 1 Bottle 11     ibuprofen (ADVIL/MOTRIN) 200 MG tablet Take 3 tablets (600 mg) by mouth every 6 hours as needed for moderate pain       omeprazole (PRILOSEC) 20 MG DR capsule TAKE 1 CAPSULE (20 MG) BY MOUTH DAILY FOR 28 DAYS 84 capsule 0     oxyCODONE (ROXICODONE) 5 MG tablet Take 1-2 tablets (5-10 mg) by mouth every 4 hours as needed for moderate to severe pain 12 tablet 0     rosuvastatin (CRESTOR) 20 MG tablet Take 1 tablet (20 mg) by mouth daily 90 tablet 3     tamsulosin (FLOMAX) 0.4 MG capsule Take 1 capsule (0.4 mg) by mouth daily for 10 doses 10 capsule 0     Allergies   Allergen Reactions     Atorvastatin      Muscle aches     Questran [Cholestyramine]      Nausea     Family History: There is no h/o  malignancy.  There is no h/o urolithiasis.     Social History     Socioeconomic History     Marital status:      Spouse name: Not on file     Number of children: Not on file     Years of education: Not on file     Highest education level: Not on file   Occupational History     Occupation: [employer]      Comment: [employer]    Tobacco Use     Smoking status: Never Smoker  "    Smokeless tobacco: Never Used   Vaping Use     Vaping Use: Never used   Substance and Sexual Activity     Alcohol use: Yes     Alcohol/week: 7.0 standard drinks     Types: 7 Glasses of wine per week     Comment: 1-2 glasses wine day     Drug use: No     Sexual activity: Yes     Partners: Female   Other Topics Concern      Service Not Asked     Blood Transfusions Not Asked     Caffeine Concern No     Occupational Exposure Not Asked     Hobby Hazards Not Asked     Sleep Concern Yes     Comment: snoring     Stress Concern Not Asked     Weight Concern No     Special Diet No     Back Care Not Asked     Exercise Yes     Comment: Active     Bike Helmet Not Asked     Seat Belt Yes     Self-Exams Not Asked     Parent/sibling w/ CABG, MI or angioplasty before 65F 55M? No   Social History Narrative     Not on file     Social Determinants of Health     Financial Resource Strain: Not on file   Food Insecurity: Not on file   Transportation Needs: Not on file   Physical Activity: Not on file   Stress: Not on file   Social Connections: Not on file   Intimate Partner Violence: Not on file   Housing Stability: Not on file       ROS: A comprehensive 14 point ROS was obtained and was  otherwise negative except for that outlined above in the HPI.    PHYSICAL EXAM:   Vitals:    09/21/22 1222   Weight: 88.5 kg (195 lb)   Height: 1.727 m (5' 8\")     GEN: NAD  EYES: EOMI  NEURO: AAO      REVIEW OF OUTSIDE RECORDS: 5 minutes spent reviewing previous/outside records.      ASSESSMENT/PLAN:  62 year old male who developed acute urinary retention requiring Null catheter placement post op. Has been taking tamsulosin daily.   -Continue Flomax x 14 more days  -TOV with nurse today or tomorrow. If fails, replace Null and attempt TOV 7 days later. If fails again, will need cystoscopy.   -UA, PVR 4 wks.   -Call if any issues voiding after catheter is removed.     Shellie Davis PA-C  [facility]      20 minutes spent on the date of " the encounter doing chart review, review of outside records, review of test results, interpretation of tests, patient visit and documentation

## 2022-09-21 NOTE — LETTER
9/21/2022       RE: Hermelindo Duffy  [address]  [place] [address]     Dear Colleague,    Thank you for referring your patient, Hermelindo Duffy, to the [facility] [place] [facility]. Please see a copy of my visit note below.    [facility]    Hermelindo is a 62 year old who is being evaluated via a billable video visit.      How would you like to obtain your AVS? [employer]  If the video visit is dropped, the invitation should be resent by: Text to cell phone: [phone]  Will anyone else be joining your video visit? No        Video-Visit Details    Video Start Time: 12:38 PM    Type of service:  Video Visit    Video End Time:12:47 PM    Originating Location (pt. Location): Home    Distant Location (provider location):  [facility] [place]     Platform used for Video Visit: [employer]    CC: Urinary retention.    HPI: It is a pleasure to see Mr. Hermelindo Duffy, a pleasant 62 year old male seen today in the urology clinic in hospital follow up of urinary retention. This developed acutely requiring Null catheter placement on 9/16/22 (vol not recorded). This has been draining well with pale, yellow urine. The patient is tolerating the catheter without issue. No clots of hematuria noted. He had hernia surgery that AM and was not required to void prior to discharge.     The patient has no prior history of AUR or similar symptoms. At baseline, patient has no BPH w/ LUTS. Currently denies fevers, chills, N/V, abdominal/back pain. On Flomax x 6 days.     Past Medical History:   Diagnosis Date     Acute duodenal ulcer with bleeding 1980s     Coronary artery disease      Eczema      Hernia, abdominal      History of angina      Hyperlipidemia LDL goal < 70     intolerant to high dose statins     Mild major depression (H)      Mumps      Sleep apnea      Stented coronary artery      Past Surgical History:    Procedure Laterality Date     ANGIOPLASTY  08/01/2010    LAD PTCA/JIA     CYSTOSCOPY       HERNIORRHAPHY INGUINAL      right, x2     HERNIORRHAPHY UMBILICAL N/A 09/15/2022    Procedure: OPEN UMBILICAL HERNIA REPAIR;  Surgeon: Violetta Eldridge MD;  Location: [facility] OR     TESTICLE SURGERY       Current Outpatient Medications   Medication Sig Dispense Refill     acetaminophen (TYLENOL) 500 MG tablet Take 2 tablets (1,000 mg) by mouth every 6 hours as needed for pain       aspirin 81 MG tablet Take  by mouth daily.       buPROPion (WELLBUTRIN XL) 150 MG 24 hr tablet Take 1 tablet (150 mg) by mouth every morning 90 tablet 3     cetirizine (ZYRTEC) 10 MG tablet daily as needed       citalopram (CELEXA) 20 MG tablet Take 1 tablet (20 mg) by mouth daily 90 tablet 3     doxycycline monohydrate (MONODOX) 100 MG capsule daily       Dupilumab (DUPIXENT) 300 MG/2ML syringe Inject 300 mg Subcutaneous every 28 (twenty-eight) days       ezetimibe (ZETIA) 10 MG tablet Take 1 tablet (10 mg) by mouth daily Appointment required for further refills 90 tablet 3     fluticasone (FLONASE) 50 MCG/ACT spray Spray 2 sprays into both nostrils daily 1 Bottle 11     ibuprofen (ADVIL/MOTRIN) 200 MG tablet Take 3 tablets (600 mg) by mouth every 6 hours as needed for moderate pain       omeprazole (PRILOSEC) 20 MG DR capsule TAKE 1 CAPSULE (20 MG) BY MOUTH DAILY FOR 28 DAYS 84 capsule 0     oxyCODONE (ROXICODONE) 5 MG tablet Take 1-2 tablets (5-10 mg) by mouth every 4 hours as needed for moderate to severe pain 12 tablet 0     rosuvastatin (CRESTOR) 20 MG tablet Take 1 tablet (20 mg) by mouth daily 90 tablet 3     tamsulosin (FLOMAX) 0.4 MG capsule Take 1 capsule (0.4 mg) by mouth daily for 10 doses 10 capsule 0     Allergies   Allergen Reactions     Atorvastatin      Muscle aches     Questran [Cholestyramine]      Nausea     Family History: There is no h/o  malignancy.  There is no h/o urolithiasis.     Social History     Socioeconomic  "History     Marital status:      Spouse name: Not on file     Number of children: Not on file     Years of education: Not on file     Highest education level: Not on file   Occupational History     Occupation: [employer]      Comment: [employer]    Tobacco Use     Smoking status: Never Smoker     Smokeless tobacco: Never Used   Vaping Use     Vaping Use: Never used   Substance and Sexual Activity     Alcohol use: Yes     Alcohol/week: 7.0 standard drinks     Types: 7 Glasses of wine per week     Comment: 1-2 glasses wine day     Drug use: No     Sexual activity: Yes     Partners: Female   Other Topics Concern      Service Not Asked     Blood Transfusions Not Asked     Caffeine Concern No     Occupational Exposure Not Asked     Hobby Hazards Not Asked     Sleep Concern Yes     Comment: snoring     Stress Concern Not Asked     Weight Concern No     Special Diet No     Back Care Not Asked     Exercise Yes     Comment: Active     Bike Helmet Not Asked     Seat Belt Yes     Self-Exams Not Asked     Parent/sibling w/ CABG, MI or angioplasty before 65F 55M? No   Social History Narrative     Not on file     Social Determinants of Health     Financial Resource Strain: Not on file   Food Insecurity: Not on file   Transportation Needs: Not on file   Physical Activity: Not on file   Stress: Not on file   Social Connections: Not on file   Intimate Partner Violence: Not on file   Housing Stability: Not on file       ROS: A comprehensive 14 point ROS was obtained and was  otherwise negative except for that outlined above in the HPI.    PHYSICAL EXAM:   Vitals:    09/21/22 1222   Weight: 88.5 kg (195 lb)   Height: 1.727 m (5' 8\")     GEN: NAD  EYES: EOMI  NEURO: AAO      REVIEW OF OUTSIDE RECORDS: 5 minutes spent reviewing previous/outside records.      ASSESSMENT/PLAN:  62 year old male who developed acute urinary retention requiring Null catheter placement post op. Has been taking tamsulosin " daily.   -Continue Flomax x 14 more days  -TOV with nurse today or tomorrow. If fails, replace Null and attempt TOV 7 days later. If fails again, will need cystoscopy.   -UA, PVR 4 wks.   -Call if any issues voiding after catheter is removed.     Shellie Davis PA-C  [facility]      20 minutes spent on the date of the encounter doing chart review, review of outside records, review of test results, interpretation of tests, patient visit and documentation

## 2022-09-22 ENCOUNTER — ALLIED HEALTH/NURSE VISIT (OUTPATIENT)
Dept: UROLOGY | Facility: CLINIC | Age: 62
End: 2022-09-22
Payer: COMMERCIAL

## 2022-09-22 DIAGNOSIS — R33.9 URINARY RETENTION: Primary | ICD-10-CM

## 2022-09-22 PROCEDURE — 51700 IRRIGATION OF BLADDER: CPT

## 2022-09-22 NOTE — PROGRESS NOTES
Hermelindo Wisdoming comes into clinic today at the request of JEN Vieyra, Ordering Provider for Trial of Void.    Patient's catheter was disconnected from leg bag, a sterile tip syringe was attached to catheter end, and 225 mL of sterile water was instilled via gravity into the bladder.  Removed Null catheter after deflating balloon completely.  Patient voided 200 mL     Patient allowed to go home without catheter.    Patient was instructed to call our office during office hours if unable to urinate, or to go to the ER if not during office hours.    Patient will return in 4 weeks to see nurse for UA/PVR.    This service provided today was under the supervising provider of the day, Dr. Covarrubias, who was available if needed.    Selam Shook, EMT

## 2022-09-29 ENCOUNTER — VIRTUAL VISIT (OUTPATIENT)
Dept: EDUCATION SERVICES | Facility: CLINIC | Age: 62
End: 2022-09-29
Payer: COMMERCIAL

## 2022-09-29 DIAGNOSIS — E11.9 TYPE 2 DIABETES MELLITUS WITHOUT COMPLICATION, WITHOUT LONG-TERM CURRENT USE OF INSULIN (H): ICD-10-CM

## 2022-09-29 PROCEDURE — G0108 DIAB MANAGE TRN  PER INDIV: HCPCS | Mod: AE

## 2022-09-29 NOTE — LETTER
9/29/2022         RE: Hermelindo Duffy  [address]  [place] [address]        Dear Colleague,    Thank you for referring your patient, Hermelindo Duffy, to the [facility] [place]. Please see a copy of my visit note below.    Diabetes Self-Management Education & Support    Presents for: Initial Assessment for new diagnosis    Type of Visit: Telephone Visit: 60 minutes    How would patient like to obtain AVS? MyChart    ASSESSMENT:    Telephone visit with Hermelindo today for new Dx of DM Type 2.  He would prefer to manage without medications.  Discussed benefits of testing BG, at this time he has decided not to test BG with meter, but is aware this can be ordered at any time.    Wt Readings from Last 5 Encounters:   09/21/22 88.5 kg (195 lb)   09/15/22 89.8 kg (198 lb)   09/13/22 90.7 kg (200 lb)   09/09/22 88.5 kg (195 lb)   08/16/22 89.4 kg (197 lb)     Typically eats 2-3 meals per day.  Does eat some fast food throughout the week.  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  Spends some time driving daily and does some sitting.  Does not have a regular exercise routine outside of work.    Patient's most recent   Lab Results   Component Value Date    A1C 6.6 08/08/2022    is not meeting goal of <8.0    Diabetes knowledge and skills assessment:   Patient is knowledgeable in diabetes management concepts related to: Healthy Eating    Continue education with the following diabetes management concepts: Healthy Eating, Being Active, Reducing Risks and Healthy Coping    Based on learning assessment above, most appropriate setting for further diabetes education would be: Individual setting.    INTERVENTIONS:    Education provided today on:  AADE Self-Care Behaviors:  Care Plan: Diabetes   Updates made by Anat Yarbrough since 9/29/2022 12:00 AM      Problem: HbA1C Not In Goal       Goal: Establish Regular Follow-Ups with PCP       Task:  Discuss with PCP the recommended timing for patient's next follow up visit(s)    Responsible User: Anat Yarbrough      Task: Discuss schedule for PCP visits with patient    Responsible User: Anat Yarbrough      Goal: Get HbA1C Level in Goal       Task: Educate patient on diabetes education self-management topics    Responsible User: Anat Yarbrough      Task: Educate patient on benefits of regular glucose monitoring    Responsible User: Anat Yarbrough      Task: Refer patient to appropriate extended care team member, as needed (Medication Therapy Management, Behavioral Health, Physical Therapy, etc.)    Responsible User: Anat Yarbrough      Task: Discuss diabetes treatment plan with patient Completed 9/29/2022   Responsible User: Anat Yarbrough      Problem: Diabetes Self-Management Education Needed to Optimize Self-Care Behaviors       Goal: Understand diabetes pathophysiology and disease progression       Task: Provide education on diabetes pathophysiology and disease progression specfic to patient's diabetes type Completed 9/29/2022   Responsible User: Anat Yarbrough      Goal: Healthy Eating - follow a healthy eating pattern for diabetes    This Visit's Progress: 0%   Note:    My Goal: I will aim for 60 grams or less of carbohydrates per meal     What I need to meet my goal: carbohydrate portion references and meal planning    I plan to meet my goal by this date: 3 months       Task: Provide education on portion control and consistency in amount, composition and timing of food intake Completed 9/29/2022   Responsible User: Anat Yarbrough      Task: Provide education on managing carbohydrate intake (carbohydrate counting, plate planning method, etc.) Completed 9/29/2022   Responsible User: Anat Yarbrough      Task: Provide education on weight management    Responsible User: Anat Yarbrough      Task: Provide  education on heart healthy eating    Responsible User: Anat Yarbrough      Task: Provide education on eating out Completed 9/29/2022   Responsible User: Anat Yarbrough      Task: Develop individualized healthy eating plan with patient    Responsible User: Anat Yarbrough      Goal: Being Active - get regular physical activity, working up to at least 150 minutes per week       Task: Provide education on relationship of activity to glucose and precautions to take if at risk for low glucose Completed 9/29/2022   Responsible User: Anat Yarbrough      Task: Discuss barriers to physical activity with patient    Responsible User: Anat Yarbrough      Task: Develop physical activity plan with patient    Responsible User: Anat Yarbrough      Task: Explore community resources including walking groups, assistance programs, and home videos    Responsible User: Anat Yarbrough      Goal: Monitoring - monitor glucose and ketones as directed    Note:    Pt prefers not to test BG at home at this time.  Will monitor progress with A1C.     Task: Provide education on blood glucose monitoring (purpose, proper technique, frequency, glucose targets, interpreting results, when to use glucose control solution, sharps disposal)    Responsible User: Anat Yarbrough      Task: Provide education on continuous glucose monitoring (sensor placement, use of luma or /reader, understanding glucose trends, alerts and alarms, differences between sensor glucose and blood glucose)    Responsible User: Anat Yarbrough      Task: Provide education on ketone monitoring (when to monitor, frequency, etc.)    Responsible User: Anat Yarbrough      Goal: Taking Medication - patient is consistently taking medications as directed       Task: Provide education on action of prescribed medication, including when to take and possible side effects    Responsible User:  Anat Yarbrough      Task: Provide education on insulin and injectable diabetes medications, including administration, storage, site selection and rotation for injection sites    Responsible User: Anat Yarbrough      Task: Discuss barriers to medication adherence with patient and provide management technique ideas as appropriate    Responsible User: Anat Yarbrough      Task: Provide education on frequency and refill details of medications    Responsible User: Anat Yarbrough      Goal: Problem Solving - know how to prevent and manage short-term diabetes complications       Task: Provide education on high blood glucose - causes, signs/symptoms, prevention and treatment    Responsible User: Anat Yarbrough      Task: Provide education on low blood glucose - causes, signs/symptoms, prevention, treatment, carrying a carbohydrate source at all times, and medical identification    Responsible User: Anat Yarbrough      Task: Provide education on safe travel with diabetes    Responsible User: Anat Yarbrough      Task: Provide education on how to care for diabetes on sick days    Responsible User: Anat Yarbrough      Task: Provide education on when to call a health care provider    Responsible User: Anat Yarbrough      Goal: Reducing Risks - know how to prevent and treat long-term diabetes complications       Task: Provide education on major complications of diabetes, prevention, early diagnostic measures and treatment of complications    Responsible User: Anat Yarbrough      Task: Provide education on recommended care for dental, eye and foot health    Responsible User: Anat Yarbrough      Task: Provide education on Hemoglobin A1c - goals and relationship to blood glucose levels Completed 9/29/2022   Responsible User: Anat Yarbrough      Task: Provide education on recommendations for heart health - lipid  levels and goals, blood pressure and goals, and aspirin therapy, if indicated    Responsible User: Anat Yarbrough      Task: Provide education on tobacco cessation    Responsible User: Anat Yarbrough      Goal: Healthy Coping - use available resources to cope with the challenges of managing diabetes       Task: Discuss recognizing feelings about having diabetes    Responsible User: Anat Yarbrough      Task: Provide education on the benefits of making appropriate lifestyle changes    Responsible User: Anat Yarbrough      Task: Provide education on benefits of utilizing support systems    Responsible User: Anat Yarbrough      Task: Discuss methods for coping with stress    Responsible User: Anat Yarbrough      Task: Provide education on when to seek professional counseling    Responsible User: Anat Yarbrough          Opportunities for ongoing education and support in diabetes-self management were discussed. Pt verbalized understanding of concepts discussed and recommendations provided today.       Education Materials Provided:  [employer] Healthy Living with Diabetes Book and My Plate Planner      PLAN    1) 1 serving of carbohydrate = 15 grams  Aim for 4 servings or 60 grams per meal and 15-20 grams of carbs per snack + a serving of protein    2) Increase physical activity as able.  Walking is a great option.  Think about how you will stay active during the winter months.    3) www.[employer] is a great resource for restaurant food choices.     4) Can have A1C re-checked every 3 months.    Topics to cover at upcoming visits: Healthy Eating, Being Active, Reducing Risks and Healthy Coping  Follow-up: November 3rd, 1:30p- telephone visit    See Goals Section for co-developed, patient-stated behavior change goals.  AVS provided to patient today.          SUBJECTIVE / OBJECTIVE:  Presents for: Initial Assessment for new  "diagnosis  Accompanied by: Self  Diabetes education in the past 24mo: No  Diabetes type: Type 2  Date of diagnosis: August 2022  Disease course: Improving  Difficulty affording diabetes medication?: No  Difficulty affording diabetes testing supplies?: No  Cultural Influences/Ethnic Background:  Not  or       Diabetes Symptoms & Complications:  Fatigue: No  Neuropathy: No  Polydipsia: No  Polyphagia: No  Polyuria: No  Visual change: Sometimes  Slow healing wounds: No  Symptom course: Stable  Weight trend: Stable  Complications assessed today?: No    Patient Problem List and Family Medical History reviewed for relevant medical history, current medical status, and diabetes risk factors.    Vitals:  There were no vitals taken for this visit.  Estimated body mass index is 29.65 kg/m  as calculated from the following:    Height as of 9/21/22: 1.727 m (5' 8\").    Weight as of 9/21/22: 88.5 kg (195 lb).   Last 3 BP:   BP Readings from Last 3 Encounters:   09/15/22 (!) 151/92   09/15/22 117/74   09/13/22 118/64       History   Smoking Status     Never Smoker   Smokeless Tobacco     Never Used       Labs:  Lab Results   Component Value Date    A1C 6.6 08/08/2022     Lab Results   Component Value Date     08/08/2022     01/29/2021     Lab Results   Component Value Date    LDL 78 04/22/2022    LDL 62 01/29/2021     HDL Cholesterol   Date Value Ref Range Status   01/29/2021 44 >39 mg/dL Final     Direct Measure HDL   Date Value Ref Range Status   04/22/2022 46 >=40 mg/dL Final   ]  GFR Estimate   Date Value Ref Range Status   04/22/2022 67 >60 mL/min/1.73m2 Final     Comment:     Effective December 21, 2021 eGFRcr in adults is calculated using the 2021 CKD-EPI creatinine equation which includes age and gender (Ligia orozco al., NEJM, DOI: 10.1056/YIOWvx9588843)   01/29/2021 77 >60 mL/min/[1.73_m2] Final     Comment:     Non  GFR Calc  Starting 12/18/2018, serum creatinine based estimated " GFR (eGFR) will be   calculated using the Chronic Kidney Disease Epidemiology Collaboration   (CKD-EPI) equation.       GFR Estimate If Black   Date Value Ref Range Status   01/29/2021 90 >60 mL/min/[1.73_m2] Final     Comment:      GFR Calc  Starting 12/18/2018, serum creatinine based estimated GFR (eGFR) will be   calculated using the Chronic Kidney Disease Epidemiology Collaboration   (CKD-EPI) equation.       Lab Results   Component Value Date    CR 1.21 04/22/2022    CR 1.03 01/29/2021     No results found for: MICROALBUMIN    Healthy Eating:  Healthy Eating Assessed Today: Yes  How many times a week on average do you eat food made away from home (restaurant/take-out)?: 3  Meals include: Evening Snack, Lunch, Dinner, Afternoon Snack  Lunch: [employer] roast beef sandwich OR sandwiches (wheat bread)  Dinner: Beef roast with carrots, onions and potatoes OR seafood ravioli (restaurant) OR mexican type food- beef burrito with beans and rice, lettuce and tomato  Snacks: italian lite bread 2 slices with raspberry preserves, handful M&M's and chocolate cupcake OR shoestring potato OR chips  Other: fast food during the week when out and about  Beverages: Coffee, Water, Other, Diet soda, Alcohol (sugar-free creamer in coffee, coke zero or water with [place] OR red wine)  Has patient met with a dietitian in the past?: Yes    Being Active:  Being Active Assessed Today: Yes  Exercise:: Yes  On average, minutes per day of exercise at this level: 10  How intense was your typical exercise? : Light (like stretching or slow walking)    Monitoring:  Monitoring Assessed Today: No      Taking Medications:    Current Treatments: None    Problem Solving:     Reducing Risks:  Reducing Risks Assessed Today: Yes  Diabetes Risks: Age over 45 years  Has dilated eye exam at least once a year?: Yes  Sees dentist every 6 months?: No    Healthy Coping:  Healthy Coping Assessed Today: No  Stage of change: ACTION (Actively working  towards change)  Patient Activation Measure Survey Score:  No flowsheet data found.          Unique Yarbrough RDN, LD  Outpatient Diabetes Education  Adult Diabetes Education Triage [phone]    Time Spent: 60 minutes  Encounter Type: Individual        Any diabetes medication dose changes were made via the Certified Diabetes Care & Education Protocol in collaboration with the patient's referring provider. A copy of this encounter was shared with the provider.

## 2022-09-29 NOTE — PROGRESS NOTES
Diabetes Self-Management Education & Support    Presents for: Initial Assessment for new diagnosis    Type of Visit: Telephone Visit: 60 minutes    How would patient like to obtain AVS? David    ASSESSMENT:    Telephone visit with Hermelindo today for new Dx of DM Type 2.  He would prefer to manage without medications.  Discussed benefits of testing BG, at this time he has decided not to test BG with meter, but is aware this can be ordered at any time.    Wt Readings from Last 5 Encounters:   09/21/22 88.5 kg (195 lb)   09/15/22 89.8 kg (198 lb)   09/13/22 90.7 kg (200 lb)   09/09/22 88.5 kg (195 lb)   08/16/22 89.4 kg (197 lb)     Typically eats 2-3 meals per day.  Does eat some fast food throughout the week.  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  Spends some time driving daily and does some sitting.  Does not have a regular exercise routine outside of work.    Patient's most recent   Lab Results   Component Value Date    A1C 6.6 08/08/2022    is not meeting goal of <8.0    Diabetes knowledge and skills assessment:   Patient is knowledgeable in diabetes management concepts related to: Healthy Eating    Continue education with the following diabetes management concepts: Healthy Eating, Being Active, Reducing Risks and Healthy Coping    Based on learning assessment above, most appropriate setting for further diabetes education would be: Individual setting.    INTERVENTIONS:    Education provided today on:  AADE Self-Care Behaviors:  Care Plan: Diabetes   Updates made by Anat Yarbrough since 9/29/2022 12:00 AM      Problem: HbA1C Not In Goal       Goal: Establish Regular Follow-Ups with PCP       Task: Discuss with PCP the recommended timing for patient's next follow up visit(s)    Responsible User: Anat Yarbrough      Task: Discuss schedule for PCP visits with patient    Responsible User: Anat Yarbrough      Goal: Get HbA1C Level in Goal       Task:  Educate patient on diabetes education self-management topics    Responsible User: Anat Yarbrough      Task: Educate patient on benefits of regular glucose monitoring    Responsible User: Anat Yarbrough      Task: Refer patient to appropriate extended care team member, as needed (Medication Therapy Management, Behavioral Health, Physical Therapy, etc.)    Responsible User: Anat Yarbrough      Task: Discuss diabetes treatment plan with patient Completed 9/29/2022   Responsible User: Anat Yarbrough      Problem: Diabetes Self-Management Education Needed to Optimize Self-Care Behaviors       Goal: Understand diabetes pathophysiology and disease progression       Task: Provide education on diabetes pathophysiology and disease progression specfic to patient's diabetes type Completed 9/29/2022   Responsible User: Anat Yarbrough      Goal: Healthy Eating - follow a healthy eating pattern for diabetes    This Visit's Progress: 0%   Note:    My Goal: I will aim for 60 grams or less of carbohydrates per meal     What I need to meet my goal: carbohydrate portion references and meal planning    I plan to meet my goal by this date: 3 months       Task: Provide education on portion control and consistency in amount, composition and timing of food intake Completed 9/29/2022   Responsible User: Anat Yarbrough      Task: Provide education on managing carbohydrate intake (carbohydrate counting, plate planning method, etc.) Completed 9/29/2022   Responsible User: Anat Yarbrough      Task: Provide education on weight management    Responsible User: Anat Yarbrough      Task: Provide education on heart healthy eating    Responsible User: Anat Yarbrough      Task: Provide education on eating out Completed 9/29/2022   Responsible User: Anat Yarbrough      Task: Develop individualized healthy eating plan with patient    Responsible User:  Anat Yarbrough      Goal: Being Active - get regular physical activity, working up to at least 150 minutes per week       Task: Provide education on relationship of activity to glucose and precautions to take if at risk for low glucose Completed 9/29/2022   Responsible User: Anat Yarbrough      Task: Discuss barriers to physical activity with patient    Responsible User: Anat Yarbrough      Task: Develop physical activity plan with patient    Responsible User: Anat Yarbrough      Task: Explore community resources including walking groups, assistance programs, and home videos    Responsible User: Anat Yarbrough      Goal: Monitoring - monitor glucose and ketones as directed    Note:    Pt prefers not to test BG at home at this time.  Will monitor progress with A1C.     Task: Provide education on blood glucose monitoring (purpose, proper technique, frequency, glucose targets, interpreting results, when to use glucose control solution, sharps disposal)    Responsible User: Anat Yarbrough      Task: Provide education on continuous glucose monitoring (sensor placement, use of luma or /reader, understanding glucose trends, alerts and alarms, differences between sensor glucose and blood glucose)    Responsible User: Anat Yarbrough      Task: Provide education on ketone monitoring (when to monitor, frequency, etc.)    Responsible User: Anat Yarbrough      Goal: Taking Medication - patient is consistently taking medications as directed       Task: Provide education on action of prescribed medication, including when to take and possible side effects    Responsible User: Anat Yarbrough      Task: Provide education on insulin and injectable diabetes medications, including administration, storage, site selection and rotation for injection sites    Responsible User: Anat Yarbrough      Task: Discuss barriers to medication  adherence with patient and provide management technique ideas as appropriate    Responsible User: Anat Yarbrough      Task: Provide education on frequency and refill details of medications    Responsible User: Anat Yarbrough      Goal: Problem Solving - know how to prevent and manage short-term diabetes complications       Task: Provide education on high blood glucose - causes, signs/symptoms, prevention and treatment    Responsible User: Anat Yarbrough      Task: Provide education on low blood glucose - causes, signs/symptoms, prevention, treatment, carrying a carbohydrate source at all times, and medical identification    Responsible User: Anat Yarbrough      Task: Provide education on safe travel with diabetes    Responsible User: Anat Yarbrough      Task: Provide education on how to care for diabetes on sick days    Responsible User: Anat Yarbrough      Task: Provide education on when to call a health care provider    Responsible User: Anat Yarbrough      Goal: Reducing Risks - know how to prevent and treat long-term diabetes complications       Task: Provide education on major complications of diabetes, prevention, early diagnostic measures and treatment of complications    Responsible User: Anat Yarbrough      Task: Provide education on recommended care for dental, eye and foot health    Responsible User: Anat Yarbrough      Task: Provide education on Hemoglobin A1c - goals and relationship to blood glucose levels Completed 9/29/2022   Responsible User: Anat Yarbrough      Task: Provide education on recommendations for heart health - lipid levels and goals, blood pressure and goals, and aspirin therapy, if indicated    Responsible User: Anat Yarbrough      Task: Provide education on tobacco cessation    Responsible User: Anat Yarbrough      Goal: Healthy Coping - use available resources to  cope with the challenges of managing diabetes       Task: Discuss recognizing feelings about having diabetes    Responsible User: Anat Yarbrough      Task: Provide education on the benefits of making appropriate lifestyle changes    Responsible User: Anat Yarbrough      Task: Provide education on benefits of utilizing support systems    Responsible User: Anat Yarbrough      Task: Discuss methods for coping with stress    Responsible User: Anat Yarbrough      Task: Provide education on when to seek professional counseling    Responsible User: Anat Yarbrough          Opportunities for ongoing education and support in diabetes-self management were discussed. Pt verbalized understanding of concepts discussed and recommendations provided today.       Education Materials Provided:  bSafe Healthy Living with Diabetes Book and My Plate Planner      PLAN    1) 1 serving of carbohydrate = 15 grams  Aim for 4 servings or 60 grams per meal and 15-20 grams of carbs per snack + a serving of protein    2) Increase physical activity as able.  Walking is a great option.  Think about how you will stay active during the winter months.    3) wwwmakerSQR is a great resource for restaurant food choices.     4) Can have A1C re-checked every 3 months.    Topics to cover at upcoming visits: Healthy Eating, Being Active, Reducing Risks and Healthy Coping  Follow-up: November 3rd, 1:30p- telephone visit    See Goals Section for co-developed, patient-stated behavior change goals.  AVS provided to patient today.          SUBJECTIVE / OBJECTIVE:  Presents for: Initial Assessment for new diagnosis  Accompanied by: Self  Diabetes education in the past 24mo: No  Diabetes type: Type 2  Date of diagnosis: August 2022  Disease course: Improving  Difficulty affording diabetes medication?: No  Difficulty affording diabetes testing supplies?: No  Cultural Influences/Ethnic  "Background:  Not  or       Diabetes Symptoms & Complications:  Fatigue: No  Neuropathy: No  Polydipsia: No  Polyphagia: No  Polyuria: No  Visual change: Sometimes  Slow healing wounds: No  Symptom course: Stable  Weight trend: Stable  Complications assessed today?: No    Patient Problem List and Family Medical History reviewed for relevant medical history, current medical status, and diabetes risk factors.    Vitals:  There were no vitals taken for this visit.  Estimated body mass index is 29.65 kg/m  as calculated from the following:    Height as of 9/21/22: 1.727 m (5' 8\").    Weight as of 9/21/22: 88.5 kg (195 lb).   Last 3 BP:   BP Readings from Last 3 Encounters:   09/15/22 (!) 151/92   09/15/22 117/74   09/13/22 118/64       History   Smoking Status     Never Smoker   Smokeless Tobacco     Never Used       Labs:  Lab Results   Component Value Date    A1C 6.6 08/08/2022     Lab Results   Component Value Date     08/08/2022     01/29/2021     Lab Results   Component Value Date    LDL 78 04/22/2022    LDL 62 01/29/2021     HDL Cholesterol   Date Value Ref Range Status   01/29/2021 44 >39 mg/dL Final     Direct Measure HDL   Date Value Ref Range Status   04/22/2022 46 >=40 mg/dL Final   ]  GFR Estimate   Date Value Ref Range Status   04/22/2022 67 >60 mL/min/1.73m2 Final     Comment:     Effective December 21, 2021 eGFRcr in adults is calculated using the 2021 CKD-EPI creatinine equation which includes age and gender (Ligia et al., [place], DOI: 10.1056/HAPGru5462438)   01/29/2021 77 >60 mL/min/[1.73_m2] Final     Comment:     Non  GFR Calc  Starting 12/18/2018, serum creatinine based estimated GFR (eGFR) will be   calculated using the Chronic Kidney Disease Epidemiology Collaboration   (CKD-EPI) equation.       GFR Estimate If Black   Date Value Ref Range Status   01/29/2021 90 >60 mL/min/[1.73_m2] Final     Comment:      GFR Calc  Starting 12/18/2018, " serum creatinine based estimated GFR (eGFR) will be   calculated using the Chronic Kidney Disease Epidemiology Collaboration   (CKD-EPI) equation.       Lab Results   Component Value Date    CR 1.21 04/22/2022    CR 1.03 01/29/2021     No results found for: MICROALBUMIN    Healthy Eating:  Healthy Eating Assessed Today: Yes  How many times a week on average do you eat food made away from home (restaurant/take-out)?: 3  Meals include: Evening Snack, Lunch, Dinner, Afternoon Snack  Lunch: [employer] roast beef sandwich OR sandwiches (wheat bread)  Dinner: Beef roast with carrots, onions and potatoes OR seafood ravioli (restaurant) OR mexican type food- beef burrito with beans and rice, lettuce and tomato  Snacks: italian lite bread 2 slices with raspberry preserves, handful M&M's and chocolate cupcake OR shoestring potato OR chips  Other: fast food during the week when out and about  Beverages: Coffee, Water, Other, Diet soda, Alcohol (sugar-free creamer in coffee, coke zero or water with Dawn OR red wine)  Has patient met with a dietitian in the past?: Yes    Being Active:  Being Active Assessed Today: Yes  Exercise:: Yes  On average, minutes per day of exercise at this level: 10  How intense was your typical exercise? : Light (like stretching or slow walking)    Monitoring:  Monitoring Assessed Today: No      Taking Medications:    Current Treatments: None    Problem Solving:     Reducing Risks:  Reducing Risks Assessed Today: Yes  Diabetes Risks: Age over 45 years  Has dilated eye exam at least once a year?: Yes  Sees dentist every 6 months?: No    Healthy Coping:  Healthy Coping Assessed Today: No  Stage of change: ACTION (Actively working towards change)  Patient Activation Measure Survey Score:  No flowsheet data found.          Unique Yarbrough RDN, LD  Outpatient Diabetes Education  Adult Diabetes Education Triage [phone]    Time Spent: 60 minutes  Encounter Type: Individual        Any diabetes medication  dose changes were made via the Certified Diabetes Care & Education Protocol in collaboration with the patient's referring provider. A copy of this encounter was shared with the provider.

## 2022-09-29 NOTE — PATIENT INSTRUCTIONS
1) 1 serving of carbohydrate = 15 grams  Aim for 4 servings or 60 grams per meal and 15-20 grams of carbs per snack + a serving of protein    2) Increase physical activity as able.  Walking is a great option.  Think about how you will stay active during the winter months.    3) www.[employer] is a great resource for restaurant food choices.     4) Can have A1C re-checked every 3 months.

## 2022-10-06 ENCOUNTER — TELEPHONE (OUTPATIENT)
Dept: SURGERY | Facility: CLINIC | Age: 62
End: 2022-10-06

## 2022-10-06 NOTE — TELEPHONE ENCOUNTER
[facility]  Post op call note     Hermelindo Wisdoming was called for an update regarding his recovery.  He underwent open umbilical hernia repair with mesh by Dr. Eldridge on 9/15/2022. Today he tells me he is doing well and denies any complaints. He is eating a normal diet and his bowels are regular. He states his wounds are healing well.    He was instructed to slowly and gradually resume all normal activities. He states all of his questions were answered.  He understands our discussion.  He agrees to follow up as needed or to call our office with any concerns.    Jovita Ng PA-C

## 2022-10-23 ENCOUNTER — HEALTH MAINTENANCE LETTER (OUTPATIENT)
Age: 62
End: 2022-10-23

## 2022-11-03 ENCOUNTER — VIRTUAL VISIT (OUTPATIENT)
Dept: EDUCATION SERVICES | Facility: CLINIC | Age: 62
End: 2022-11-03
Payer: COMMERCIAL

## 2022-11-03 DIAGNOSIS — E11.9 TYPE 2 DIABETES MELLITUS WITHOUT COMPLICATION, WITHOUT LONG-TERM CURRENT USE OF INSULIN (H): Primary | ICD-10-CM

## 2022-11-03 PROCEDURE — G0108 DIAB MANAGE TRN  PER INDIV: HCPCS | Mod: AE

## 2022-11-03 NOTE — PROGRESS NOTES
"Diabetes Self-Management Education & Support    Presents for: Follow-up    Type of Visit: Telephone Visit   Provider location: Home  Patient location: Home  Call time: 1:32-2:02pm    How would patient like to obtain AVS? David    ASSESSMENT:    Follow up with Hermelindo for new Dx of DM type 2.  He has been doing well with making healthy changes.  Reviewed materials that were sent via mail following last visit.  Has been drinking only zero sugar beverages.  When he eats bread, uses 100% whole wheat.  Has been avoiding pasta in general.  Picked up a flyer from [employer] regarding diabetes which helped with label reading.  Has been enjoying sugar-free cookies as snacks.  Does not consider himself to be an \"overeater\" generally does not eat large portions.  Usually shares meals when out with his wife.  Typically does not eat breakfast, worries he is not eating enough, but is unsure if he is losing weight.  Personal goal is to eat more fruits and vegetables.    Has been walking more with his wife and has been helping care for his grandson which keeps him active.  Encouraged him to make appointment with PCP to follow up and have A1C rechecked.      Patient's most recent   Lab Results   Component Value Date    A1C 6.6 08/08/2022    is not meeting goal of <8% per MD    Diabetes knowledge and skills assessment:   Patient is knowledgeable in diabetes management concepts related to: Healthy Eating, Reducing Risks and Healthy Coping    Continue education with the following diabetes management concepts: Healthy Eating, Being Active and Reducing Risks    Based on learning assessment above, most appropriate setting for further diabetes education would be: Individual setting.    INTERVENTIONS:    Education provided today on:  AADE Self-Care Behaviors:  Care Plan: Diabetes   Updates made by Anat Yarbrough since 11/3/2022 12:00 AM      Problem: Diabetes Self-Management Education Needed to Optimize Self-Care Behaviors       Goal: " Healthy Eating - follow a healthy eating pattern for diabetes    This Visit's Progress: On track   Recent Progress: 0%   Note:    My Goal: I will aim for 60 grams or less of carbohydrates per meal     What I need to meet my goal: carbohydrate portion references and meal planning    I plan to meet my goal by this date: 3 months       Goal: Being Active - get regular physical activity, working up to at least 150 minutes per week    Start Date: 11/3/2022   This Visit's Progress: 30%   Note:    My Goal: I will walk more with wife    What I need to meet my goal: a plan- location and time    I plan to meet my goal by this date: 3 months      Task: Discuss barriers to physical activity with patient Completed 11/3/2022   Responsible User: Anat Yarbrough      Task: Develop physical activity plan with patient Completed 11/3/2022   Responsible User: Anat Yarbrough      Goal: Healthy Coping - use available resources to cope with the challenges of managing diabetes       Task: Discuss recognizing feelings about having diabetes Completed 11/3/2022   Responsible User: Anat Yarbrough      Task: Provide education on the benefits of making appropriate lifestyle changes Completed 11/3/2022   Responsible User: Anat Yarbrough          Opportunities for ongoing education and support in diabetes-self management were discussed. Pt verbalized understanding of concepts discussed and recommendations provided today.       Education Materials Provided:  No new materials provided today          PLAN    1) Continue to avoid sugar-sweetened beverages    2) You stated a personal goal of including more fruits and vegetables.  Try www.eatright.org OR www.choosemyplate.gov for more ideas of how to include fruits and vegetables in your meals and snacks    3) Continue walking- establishing a routine with this is beneficial.  Make a plan for where you can continue to walk during the winter months.    Topics to  "cover at upcoming visits: Reducing Risks  Follow-up: Pt prefers to schedule after the 1st of the year    See Goals Section for co-developed, patient-stated behavior change goals.  AVS provided to patient today.          SUBJECTIVE / OBJECTIVE:  Presents for: Follow-up  Accompanied by: Self  Diabetes education in the past 24mo: No  Diabetes type: Type 2  Date of diagnosis: August 2022  Disease course: Improving  Difficulty affording diabetes medication?: No  Difficulty affording diabetes testing supplies?: No  Cultural Influences/Ethnic Background:  Not  or       Diabetes Symptoms & Complications:  Fatigue: No  Neuropathy: No  Polydipsia: No  Polyphagia: No  Polyuria: No  Visual change: Sometimes  Slow healing wounds: No  Symptom course: Stable  Weight trend: Stable  Complications assessed today?: No    Patient Problem List and Family Medical History reviewed for relevant medical history, current medical status, and diabetes risk factors.    Vitals:  There were no vitals taken for this visit.  Estimated body mass index is 29.65 kg/m  as calculated from the following:    Height as of 9/21/22: 1.727 m (5' 8\").    Weight as of 9/21/22: 88.5 kg (195 lb).   Last 3 BP:   BP Readings from Last 3 Encounters:   09/15/22 (!) 151/92   09/15/22 117/74   09/13/22 118/64       History   Smoking Status     Never   Smokeless Tobacco     Never       Labs:  Lab Results   Component Value Date    A1C 6.6 08/08/2022     Lab Results   Component Value Date     08/08/2022     01/29/2021     Lab Results   Component Value Date    LDL 78 04/22/2022    LDL 62 01/29/2021     HDL Cholesterol   Date Value Ref Range Status   01/29/2021 44 >39 mg/dL Final     Direct Measure HDL   Date Value Ref Range Status   04/22/2022 46 >=40 mg/dL Final   ]  GFR Estimate   Date Value Ref Range Status   04/22/2022 67 >60 mL/min/1.73m2 Final     Comment:     Effective December 21, 2021 eGFRcr in adults is calculated using the 2021 " CKD-EPI creatinine equation which includes age and gender (Ligia orozco al., NEJM, DOI: 10.1056/NUNYlq1915443)   01/29/2021 77 >60 mL/min/[1.73_m2] Final     Comment:     Non  GFR Calc  Starting 12/18/2018, serum creatinine based estimated GFR (eGFR) will be   calculated using the Chronic Kidney Disease Epidemiology Collaboration   (CKD-EPI) equation.       GFR Estimate If Black   Date Value Ref Range Status   01/29/2021 90 >60 mL/min/[1.73_m2] Final     Comment:      GFR Calc  Starting 12/18/2018, serum creatinine based estimated GFR (eGFR) will be   calculated using the Chronic Kidney Disease Epidemiology Collaboration   (CKD-EPI) equation.       Lab Results   Component Value Date    CR 1.21 04/22/2022    CR 1.03 01/29/2021     No results found for: MICROALBUMIN    Healthy Eating:  Healthy Eating Assessed Today: Yes  How many times a week on average do you eat food made away from home (restaurant/take-out)?: 3  Meals include: Evening Snack, Lunch, Dinner, Afternoon Snack  Lunch: [employer] roast beef sandwich OR sandwiches (wheat bread)  Dinner: Beef roast with carrots, onions and potatoes OR seafood ravioli (restaurant) OR mexican type food- beef burrito with beans and rice, lettuce and tomato  Snacks: pocorn, small bowl of chips OR shoestring potatoes, sugar-free cookies, 1/2 PB sandwich or raspberry preserves  Other: fast food during the week when out and about  Beverages: Coffee, Water, Other, Diet soda, Alcohol (sugar-free creamer in coffee, coke zero or water with Hollis OR red wine)  Has patient met with a dietitian in the past?: Yes    Being Active:  Being Active Assessed Today: Yes  Exercise:: Yes  Days per week of moderate to strenuous exercise (like a brisk walk): 5  On average, minutes per day of exercise at this level: 20  How intense was your typical exercise? : Light (like stretching or slow walking)  Exercise Minutes per Week: 100    Monitoring:  Monitoring Assessed Today:  No    Glucose data:  Is not monitoring BG      Taking Medications:      Current Treatments: None    Problem Solving:         Reducing Risks:  Reducing Risks Assessed Today: Yes  Diabetes Risks: Age over 45 years  Has dilated eye exam at least once a year?: Yes  Sees dentist every 6 months?: No    Healthy Coping:  Healthy Coping Assessed Today: No  Emotional response to diabetes: Ready to learn  Stage of change: ACTION (Actively working towards change)  Patient Activation Measure Survey Score:  No flowsheet data found.          Unique Yarbrough RDN, LD  Outpatient Diabetes Education  Adult Diabetes Education Triage [phone]    Time Spent: 30 minutes  Encounter Type: Individual        Any diabetes medication dose changes were made via the Certified Diabetes Care & Education Protocol in collaboration with the patient's referring provider. A copy of this encounter was shared with the provider.

## 2022-11-03 NOTE — LETTER
"    11/3/2022         RE: Hermelindo Duffy  [address]  [place] [address]        Dear Colleague,    Thank you for referring your patient, Hermelindo Duffy, to the [facility] DIABETES EDUC[facility] [place]. Please see a copy of my visit note below.    Diabetes Self-Management Education & Support    Presents for: Follow-up    Type of Visit: Telephone Visit   Provider location: Home  Patient location: Home  Call time: 1:32-2:02pm    How would patient like to obtain AVS? MyChart    ASSESSMENT:    Follow up with Hermelindo for new Dx of DM type 2.  He has been doing well with making healthy changes.  Reviewed materials that were sent via mail following last visit.  Has been drinking only zero sugar beverages.  When he eats bread, uses 100% whole wheat.  Has been avoiding pasta in general.  Picked up a flyer from [employer] regarding diabetes which helped with label reading.  Has been enjoying sugar-free cookies as snacks.  Does not consider himself to be an \"overeater\" generally does not eat large portions.  Usually shares meals when out with his wife.  Typically does not eat breakfast, worries he is not eating enough, but is unsure if he is losing weight.  Personal goal is to eat more fruits and vegetables.    Has been walking more with his wife and has been helping care for his grandson which keeps him active.  Encouraged him to make appointment with PCP to follow up and have A1C rechecked.      Patient's most recent   Lab Results   Component Value Date    A1C 6.6 08/08/2022    is not meeting goal of <8% per MD    Diabetes knowledge and skills assessment:   Patient is knowledgeable in diabetes management concepts related to: Healthy Eating, Reducing Risks and Healthy Coping    Continue education with the following diabetes management concepts: Healthy Eating, Being Active and Reducing Risks    Based on learning assessment above, most appropriate setting for further diabetes education would be: Individual " setting.    INTERVENTIONS:    Education provided today on:  AADE Self-Care Behaviors:  Care Plan: Diabetes   Updates made by Anat Yarbrough since 11/3/2022 12:00 AM      Problem: Diabetes Self-Management Education Needed to Optimize Self-Care Behaviors       Goal: Healthy Eating - follow a healthy eating pattern for diabetes    This Visit's Progress: On track   Recent Progress: 0%   Note:    My Goal: I will aim for 60 grams or less of carbohydrates per meal     What I need to meet my goal: carbohydrate portion references and meal planning    I plan to meet my goal by this date: 3 months       Goal: Being Active - get regular physical activity, working up to at least 150 minutes per week    Start Date: 11/3/2022   This Visit's Progress: 30%   Note:    My Goal: I will walk more with wife    What I need to meet my goal: a plan- location and time    I plan to meet my goal by this date: 3 months      Task: Discuss barriers to physical activity with patient Completed 11/3/2022   Responsible User: Anat Yarbrough      Task: Develop physical activity plan with patient Completed 11/3/2022   Responsible User: Anat Yarbrough      Goal: Healthy Coping - use available resources to cope with the challenges of managing diabetes       Task: Discuss recognizing feelings about having diabetes Completed 11/3/2022   Responsible User: Anat Yarbrough      Task: Provide education on the benefits of making appropriate lifestyle changes Completed 11/3/2022   Responsible User: Anat Yarbrough          Opportunities for ongoing education and support in diabetes-self management were discussed. Pt verbalized understanding of concepts discussed and recommendations provided today.       Education Materials Provided:  No new materials provided today          PLAN    1) Continue to avoid sugar-sweetened beverages    2) You stated a personal goal of including more fruits and vegetables.  Try  "www.eatright.org OR www.choosemyplate.gov for more ideas of how to include fruits and vegetables in your meals and snacks    3) Continue walking- establishing a routine with this is beneficial.  Make a plan for where you can continue to walk during the winter months.    Topics to cover at upcoming visits: Reducing Risks  Follow-up: Pt prefers to schedule after the 1st of the year    See Goals Section for co-developed, patient-stated behavior change goals.  AVS provided to patient today.          SUBJECTIVE / OBJECTIVE:  Presents for: Follow-up  Accompanied by: Self  Diabetes education in the past 24mo: No  Diabetes type: Type 2  Date of diagnosis: August 2022  Disease course: Improving  Difficulty affording diabetes medication?: No  Difficulty affording diabetes testing supplies?: No  Cultural Influences/Ethnic Background:  Not  or       Diabetes Symptoms & Complications:  Fatigue: No  Neuropathy: No  Polydipsia: No  Polyphagia: No  Polyuria: No  Visual change: Sometimes  Slow healing wounds: No  Symptom course: Stable  Weight trend: Stable  Complications assessed today?: No    Patient Problem List and Family Medical History reviewed for relevant medical history, current medical status, and diabetes risk factors.    Vitals:  There were no vitals taken for this visit.  Estimated body mass index is 29.65 kg/m  as calculated from the following:    Height as of 9/21/22: 1.727 m (5' 8\").    Weight as of 9/21/22: 88.5 kg (195 lb).   Last 3 BP:   BP Readings from Last 3 Encounters:   09/15/22 (!) 151/92   09/15/22 117/74   09/13/22 118/64       History   Smoking Status     Never   Smokeless Tobacco     Never       Labs:  Lab Results   Component Value Date    A1C 6.6 08/08/2022     Lab Results   Component Value Date     08/08/2022     01/29/2021     Lab Results   Component Value Date    LDL 78 04/22/2022    LDL 62 01/29/2021     HDL Cholesterol   Date Value Ref Range Status   01/29/2021 44 >39 " mg/dL Final     Direct Measure HDL   Date Value Ref Range Status   04/22/2022 46 >=40 mg/dL Final   ]  GFR Estimate   Date Value Ref Range Status   04/22/2022 67 >60 mL/min/1.73m2 Final     Comment:     Effective December 21, 2021 eGFRcr in adults is calculated using the 2021 CKD-EPI creatinine equation which includes age and gender (Ligia et al., [facility], DOI: 10.1056/EXPNmu5994463)   01/29/2021 77 >60 mL/min/[1.73_m2] Final     Comment:     Non  GFR Calc  Starting 12/18/2018, serum creatinine based estimated GFR (eGFR) will be   calculated using the Chronic Kidney Disease Epidemiology Collaboration   (CKD-EPI) equation.       GFR Estimate If Black   Date Value Ref Range Status   01/29/2021 90 >60 mL/min/[1.73_m2] Final     Comment:      GFR Calc  Starting 12/18/2018, serum creatinine based estimated GFR (eGFR) will be   calculated using the Chronic Kidney Disease Epidemiology Collaboration   (CKD-EPI) equation.       Lab Results   Component Value Date    CR 1.21 04/22/2022    CR 1.03 01/29/2021     No results found for: MICROALBUMIN    Healthy Eating:  Healthy Eating Assessed Today: Yes  How many times a week on average do you eat food made away from home (restaurant/take-out)?: 3  Meals include: Evening Snack, Lunch, Dinner, Afternoon Snack  Lunch: [employer] roast beef sandwich OR sandwiches (wheat bread)  Dinner: Beef roast with carrots, onions and potatoes OR seafood ravioli (restaurant) OR mexican type food- beef burrito with beans and rice, lettuce and tomato  Snacks: pocorn, small bowl of chips OR shoestring potatoes, sugar-free cookies, 1/2 PB sandwich or raspberry preserves  Other: fast food during the week when out and about  Beverages: Coffee, Water, Other, Diet soda, Alcohol (sugar-free creamer in coffee, coke zero or water with [place] OR red wine)  Has patient met with a dietitian in the past?: Yes    Being Active:  Being Active Assessed Today: Yes  Exercise:: Yes  Days per week  of moderate to strenuous exercise (like a brisk walk): 5  On average, minutes per day of exercise at this level: 20  How intense was your typical exercise? : Light (like stretching or slow walking)  Exercise Minutes per Week: 100    Monitoring:  Monitoring Assessed Today: No    Glucose data:  Is not monitoring BG      Taking Medications:      Current Treatments: None    Problem Solving:         Reducing Risks:  Reducing Risks Assessed Today: Yes  Diabetes Risks: Age over 45 years  Has dilated eye exam at least once a year?: Yes  Sees dentist every 6 months?: No    Healthy Coping:  Healthy Coping Assessed Today: No  Emotional response to diabetes: Ready to learn  Stage of change: ACTION (Actively working towards change)  Patient Activation Measure Survey Score:  No flowsheet data found.          Unique Yarbrough RDN, LD  Outpatient Diabetes Education  Adult Diabetes Education Triage [phone]    Time Spent: 30 minutes  Encounter Type: Individual        Any diabetes medication dose changes were made via the Certified Diabetes Care & Education Protocol in collaboration with the patient's referring provider. A copy of this encounter was shared with the provider.

## 2022-11-23 DIAGNOSIS — R10.13 EPIGASTRIC PAIN: ICD-10-CM

## 2022-11-28 NOTE — TELEPHONE ENCOUNTER
Routing refill request to provider for review/approval because:  Passes protocol but last sig states to take for a specific amount of days. Does provider want to continue refilling this medication at this time?  Brittany KLEIN RN  [facility]

## 2022-12-10 ENCOUNTER — HEALTH MAINTENANCE LETTER (OUTPATIENT)
Age: 62
End: 2022-12-10

## 2023-02-10 ENCOUNTER — HOSPITAL ENCOUNTER (OUTPATIENT)
Facility: CLINIC | Age: 63
Setting detail: OBSERVATION
Discharge: HOME | End: 2023-02-11
Attending: EMERGENCY MEDICINE | Admitting: INTERNAL MEDICINE
Payer: COMMERCIAL

## 2023-02-10 ENCOUNTER — APPOINTMENT (OUTPATIENT)
Dept: GENERAL RADIOLOGY | Facility: CLINIC | Age: 63
End: 2023-02-10
Attending: EMERGENCY MEDICINE
Payer: COMMERCIAL

## 2023-02-10 DIAGNOSIS — I48.91 NEW ONSET ATRIAL FIBRILLATION (H): Primary | ICD-10-CM

## 2023-02-10 LAB
ANION GAP SERPL CALCULATED.3IONS-SCNC: 10 MMOL/L (ref 7–15)
BASOPHILS # BLD AUTO: 0 10E3/UL (ref 0–0.2)
BASOPHILS NFR BLD AUTO: 0 %
BUN SERPL-MCNC: 19.6 MG/DL (ref 8–23)
CALCIUM SERPL-MCNC: 9.4 MG/DL (ref 8.8–10.2)
CHLORIDE SERPL-SCNC: 105 MMOL/L (ref 98–107)
CREAT SERPL-MCNC: 1.04 MG/DL (ref 0.67–1.17)
DEPRECATED HCO3 PLAS-SCNC: 23 MMOL/L (ref 22–29)
EOSINOPHIL # BLD AUTO: 0 10E3/UL (ref 0–0.7)
EOSINOPHIL NFR BLD AUTO: 0 %
ERYTHROCYTE [DISTWIDTH] IN BLOOD BY AUTOMATED COUNT: 12.6 % (ref 10–15)
ERYTHROCYTE [DISTWIDTH] IN BLOOD BY AUTOMATED COUNT: 12.6 % (ref 10–15)
GFR SERPL CREATININE-BSD FRML MDRD: 80 ML/MIN/1.73M2
GLUCOSE SERPL-MCNC: 142 MG/DL (ref 70–99)
HCT VFR BLD AUTO: 40.7 % (ref 40–53)
HCT VFR BLD AUTO: 44.1 % (ref 40–53)
HGB BLD-MCNC: 14.1 G/DL (ref 13.3–17.7)
HGB BLD-MCNC: 14.8 G/DL (ref 13.3–17.7)
HOLD SPECIMEN: NORMAL
IMM GRANULOCYTES # BLD: 0.1 10E3/UL
IMM GRANULOCYTES NFR BLD: 1 %
LYMPHOCYTES # BLD AUTO: 1 10E3/UL (ref 0.8–5.3)
LYMPHOCYTES NFR BLD AUTO: 8 %
MAGNESIUM SERPL-MCNC: 2.2 MG/DL (ref 1.7–2.3)
MCH RBC QN AUTO: 31.9 PG (ref 26.5–33)
MCH RBC QN AUTO: 32.1 PG (ref 26.5–33)
MCHC RBC AUTO-ENTMCNC: 33.6 G/DL (ref 31.5–36.5)
MCHC RBC AUTO-ENTMCNC: 34.6 G/DL (ref 31.5–36.5)
MCV RBC AUTO: 92 FL (ref 78–100)
MCV RBC AUTO: 96 FL (ref 78–100)
MONOCYTES # BLD AUTO: 1 10E3/UL (ref 0–1.3)
MONOCYTES NFR BLD AUTO: 8 %
NEUTROPHILS # BLD AUTO: 10.5 10E3/UL (ref 1.6–8.3)
NEUTROPHILS NFR BLD AUTO: 83 %
NRBC # BLD AUTO: 0 10E3/UL
NRBC BLD AUTO-RTO: 0 /100
PLATELET # BLD AUTO: 178 10E3/UL (ref 150–450)
PLATELET # BLD AUTO: 200 10E3/UL (ref 150–450)
POTASSIUM SERPL-SCNC: 4.4 MMOL/L (ref 3.4–5.3)
RBC # BLD AUTO: 4.42 10E6/UL (ref 4.4–5.9)
RBC # BLD AUTO: 4.61 10E6/UL (ref 4.4–5.9)
SODIUM SERPL-SCNC: 138 MMOL/L (ref 136–145)
TROPONIN T SERPL HS-MCNC: 23 NG/L
TROPONIN T SERPL HS-MCNC: 32 NG/L
TROPONIN T SERPL HS-MCNC: 9 NG/L
TSH SERPL DL<=0.005 MIU/L-ACNC: 2.48 UIU/ML (ref 0.3–4.2)
UFH PPP CHRO-ACNC: 0.71 IU/ML
WBC # BLD AUTO: 12.6 10E3/UL (ref 4–11)
WBC # BLD AUTO: 13.9 10E3/UL (ref 4–11)

## 2023-02-10 PROCEDURE — 96365 THER/PROPH/DIAG IV INF INIT: CPT

## 2023-02-10 PROCEDURE — 85025 COMPLETE CBC W/AUTO DIFF WBC: CPT | Performed by: EMERGENCY MEDICINE

## 2023-02-10 PROCEDURE — 99214 OFFICE O/P EST MOD 30 MIN: CPT | Performed by: PHYSICIAN ASSISTANT

## 2023-02-10 PROCEDURE — 36415 COLL VENOUS BLD VENIPUNCTURE: CPT | Performed by: NURSE PRACTITIONER

## 2023-02-10 PROCEDURE — 80048 BASIC METABOLIC PNL TOTAL CA: CPT | Performed by: EMERGENCY MEDICINE

## 2023-02-10 PROCEDURE — 96366 THER/PROPH/DIAG IV INF ADDON: CPT

## 2023-02-10 PROCEDURE — 85520 HEPARIN ASSAY: CPT | Performed by: NURSE PRACTITIONER

## 2023-02-10 PROCEDURE — 99291 CRITICAL CARE FIRST HOUR: CPT | Mod: 25

## 2023-02-10 PROCEDURE — 83735 ASSAY OF MAGNESIUM: CPT | Performed by: EMERGENCY MEDICINE

## 2023-02-10 PROCEDURE — 84484 ASSAY OF TROPONIN QUANT: CPT | Performed by: EMERGENCY MEDICINE

## 2023-02-10 PROCEDURE — 84484 ASSAY OF TROPONIN QUANT: CPT | Performed by: NURSE PRACTITIONER

## 2023-02-10 PROCEDURE — G0378 HOSPITAL OBSERVATION PER HR: HCPCS

## 2023-02-10 PROCEDURE — 71046 X-RAY EXAM CHEST 2 VIEWS: CPT

## 2023-02-10 PROCEDURE — 250N000011 HC RX IP 250 OP 636: Performed by: EMERGENCY MEDICINE

## 2023-02-10 PROCEDURE — 250N000009 HC RX 250: Performed by: NURSE PRACTITIONER

## 2023-02-10 PROCEDURE — 85027 COMPLETE CBC AUTOMATED: CPT | Performed by: NURSE PRACTITIONER

## 2023-02-10 PROCEDURE — 250N000013 HC RX MED GY IP 250 OP 250 PS 637: Performed by: NURSE PRACTITIONER

## 2023-02-10 PROCEDURE — 93005 ELECTROCARDIOGRAM TRACING: CPT

## 2023-02-10 PROCEDURE — 258N000003 HC RX IP 258 OP 636: Performed by: EMERGENCY MEDICINE

## 2023-02-10 PROCEDURE — 96375 TX/PRO/DX INJ NEW DRUG ADDON: CPT

## 2023-02-10 PROCEDURE — 250N000011 HC RX IP 250 OP 636: Performed by: NURSE PRACTITIONER

## 2023-02-10 PROCEDURE — 96361 HYDRATE IV INFUSION ADD-ON: CPT

## 2023-02-10 PROCEDURE — 96374 THER/PROPH/DIAG INJ IV PUSH: CPT

## 2023-02-10 PROCEDURE — 84443 ASSAY THYROID STIM HORMONE: CPT | Performed by: NURSE PRACTITIONER

## 2023-02-10 PROCEDURE — 99292 CRITICAL CARE ADDL 30 MIN: CPT

## 2023-02-10 PROCEDURE — 36415 COLL VENOUS BLD VENIPUNCTURE: CPT | Performed by: EMERGENCY MEDICINE

## 2023-02-10 PROCEDURE — 99222 1ST HOSP IP/OBS MODERATE 55: CPT | Mod: AI | Performed by: NURSE PRACTITIONER

## 2023-02-10 RX ORDER — AMOXICILLIN 250 MG
2 CAPSULE ORAL 2 TIMES DAILY PRN
Status: DISCONTINUED | OUTPATIENT
Start: 2023-02-10 | End: 2023-02-11 | Stop reason: HOSPADM

## 2023-02-10 RX ORDER — ONDANSETRON 2 MG/ML
4 INJECTION INTRAMUSCULAR; INTRAVENOUS EVERY 6 HOURS PRN
Status: DISCONTINUED | OUTPATIENT
Start: 2023-02-10 | End: 2023-02-11 | Stop reason: HOSPADM

## 2023-02-10 RX ORDER — CITALOPRAM HYDROBROMIDE 20 MG/1
20 TABLET ORAL DAILY
Status: DISCONTINUED | OUTPATIENT
Start: 2023-02-10 | End: 2023-02-11 | Stop reason: HOSPADM

## 2023-02-10 RX ORDER — AMOXICILLIN 250 MG
1 CAPSULE ORAL 2 TIMES DAILY PRN
Status: DISCONTINUED | OUTPATIENT
Start: 2023-02-10 | End: 2023-02-11 | Stop reason: HOSPADM

## 2023-02-10 RX ORDER — DOXYCYCLINE HYCLATE 50 MG/1
50 CAPSULE ORAL DAILY
COMMUNITY
End: 2023-09-08 | Stop reason: ALTCHOICE

## 2023-02-10 RX ORDER — ASPIRIN 81 MG/1
81 TABLET ORAL DAILY
Status: DISCONTINUED | OUTPATIENT
Start: 2023-02-11 | End: 2023-02-11 | Stop reason: HOSPADM

## 2023-02-10 RX ORDER — DILTIAZEM HYDROCHLORIDE 5 MG/ML
25 INJECTION INTRAVENOUS ONCE
Status: COMPLETED | OUTPATIENT
Start: 2023-02-10 | End: 2023-02-10

## 2023-02-10 RX ORDER — PANTOPRAZOLE SODIUM 40 MG/1
40 TABLET, DELAYED RELEASE ORAL
Status: DISCONTINUED | OUTPATIENT
Start: 2023-02-11 | End: 2023-02-11 | Stop reason: HOSPADM

## 2023-02-10 RX ORDER — METOPROLOL TARTRATE 25 MG/1
25 TABLET, FILM COATED ORAL 2 TIMES DAILY
Status: DISCONTINUED | OUTPATIENT
Start: 2023-02-10 | End: 2023-02-11 | Stop reason: HOSPADM

## 2023-02-10 RX ORDER — METOPROLOL TARTRATE 1 MG/ML
5 INJECTION, SOLUTION INTRAVENOUS EVERY 5 MIN PRN
Status: DISCONTINUED | OUTPATIENT
Start: 2023-02-10 | End: 2023-02-10

## 2023-02-10 RX ORDER — BUPROPION HYDROCHLORIDE 150 MG/1
150 TABLET ORAL EVERY MORNING
Status: DISCONTINUED | OUTPATIENT
Start: 2023-02-11 | End: 2023-02-11 | Stop reason: HOSPADM

## 2023-02-10 RX ORDER — ACETAMINOPHEN 325 MG/1
650 TABLET ORAL EVERY 6 HOURS PRN
Status: DISCONTINUED | OUTPATIENT
Start: 2023-02-10 | End: 2023-02-11 | Stop reason: HOSPADM

## 2023-02-10 RX ORDER — METOPROLOL TARTRATE 1 MG/ML
5 INJECTION, SOLUTION INTRAVENOUS EVERY 5 MIN PRN
Status: DISCONTINUED | OUTPATIENT
Start: 2023-02-10 | End: 2023-02-11 | Stop reason: HOSPADM

## 2023-02-10 RX ORDER — ENOXAPARIN SODIUM 100 MG/ML
1 INJECTION SUBCUTANEOUS EVERY 12 HOURS
Status: CANCELLED | OUTPATIENT
Start: 2023-02-10

## 2023-02-10 RX ORDER — ASPIRIN 81 MG/1
81 TABLET ORAL DAILY
COMMUNITY

## 2023-02-10 RX ORDER — HEPARIN SODIUM 10000 [USP'U]/100ML
0-5000 INJECTION, SOLUTION INTRAVENOUS CONTINUOUS
Status: DISCONTINUED | OUTPATIENT
Start: 2023-02-10 | End: 2023-02-11

## 2023-02-10 RX ORDER — ONDANSETRON 4 MG/1
4 TABLET, ORALLY DISINTEGRATING ORAL EVERY 6 HOURS PRN
Status: DISCONTINUED | OUTPATIENT
Start: 2023-02-10 | End: 2023-02-11 | Stop reason: HOSPADM

## 2023-02-10 RX ORDER — FAMOTIDINE 20 MG/1
20 TABLET, FILM COATED ORAL DAILY PRN
COMMUNITY

## 2023-02-10 RX ORDER — ACETAMINOPHEN 650 MG/1
650 SUPPOSITORY RECTAL EVERY 6 HOURS PRN
Status: DISCONTINUED | OUTPATIENT
Start: 2023-02-10 | End: 2023-02-11 | Stop reason: HOSPADM

## 2023-02-10 RX ADMIN — HEPARIN SODIUM 1050 UNITS/HR: 10000 INJECTION, SOLUTION INTRAVENOUS at 16:15

## 2023-02-10 RX ADMIN — METOPROLOL TARTRATE 25 MG: 25 TABLET, FILM COATED ORAL at 22:28

## 2023-02-10 RX ADMIN — METOPROLOL TARTRATE 25 MG: 25 TABLET, FILM COATED ORAL at 13:17

## 2023-02-10 RX ADMIN — SODIUM CHLORIDE 1000 ML: 9 INJECTION, SOLUTION INTRAVENOUS at 08:14

## 2023-02-10 RX ADMIN — METOPROLOL TARTRATE 5 MG: 5 INJECTION INTRAVENOUS at 13:34

## 2023-02-10 RX ADMIN — CITALOPRAM HYDROBROMIDE 20 MG: 20 TABLET ORAL at 13:13

## 2023-02-10 RX ADMIN — DILTIAZEM HYDROCHLORIDE 25 MG: 5 INJECTION INTRAVENOUS at 08:36

## 2023-02-10 RX ADMIN — SODIUM CHLORIDE 1000 ML: 9 INJECTION, SOLUTION INTRAVENOUS at 08:55

## 2023-02-10 ASSESSMENT — ACTIVITIES OF DAILY LIVING (ADL)
ADLS_ACUITY_SCORE: 31
ADLS_ACUITY_SCORE: 35
ADLS_ACUITY_SCORE: 35
ADLS_ACUITY_SCORE: 31
ADLS_ACUITY_SCORE: 35
ADLS_ACUITY_SCORE: 35
ADLS_ACUITY_SCORE: 31
ADLS_ACUITY_SCORE: 31

## 2023-02-10 ASSESSMENT — ENCOUNTER SYMPTOMS
DIZZINESS: 0
SHORTNESS OF BREATH: 1
LIGHT-HEADEDNESS: 0
NAUSEA: 0
VOMITING: 0

## 2023-02-10 NOTE — ED NOTES
Bed: ST02  Expected date: 2/10/23  Expected time: 7:45 AM  Means of arrival: Ambulance  Comments:  515 62m afib rvr, htn, chest pain ETA 0800

## 2023-02-10 NOTE — ED NOTES
Blood pressure has improved after 2nd litter NS started, patient was able to ambulate with stand by assist and go to bathroom. Chest pain and jaw pain free at this time.

## 2023-02-10 NOTE — H&P
[facility]    History and Physical - Hospitalist Service       Date of Admission:  2/10/2023    Assessment & Plan      Hermelindo Duffy is a 62 year old male admitted on 2/10/2023. He has a past medical history including CAD, GERD, ASCVD, and KIMBERLY who presents to the emergency department with chest pressure, palpitations, found to be in atrial fibrillation with RVR at 220 bpm.    Acute onset atrial fibrillation, RVR  Hemodynamically stable, but presented with chest pressure.  Rate controlled in the emergency department with diltiazem infusion. Pt did have some resultant hypotension, resolved with 2L crystalloid bolus.  CXR neg for acute abnormality.  No significant metabolic or hematologic abnormalities in lab work, patient has mild leukocytosis without any symptoms or signs to suggest acute infection.  - echo pending  - start rate control w/ Metoprolol 25 mg PO BID (given hypotension w/ dilt), with PRN Metoprolol IV available for breakthrough rate control   - TSH pending  - Cardiology consult (Pt of Dr. Carranza) - would appreciate recommendations regarding anticoagulation   - [id number] score 1 (DMII only).     NSTEMI  HX of CAD s/p PCI to LAD, 2010  Has followed w/ Dr. Carranza, seen in 04/2022. Noted to have chest pressure on arrival to ED. Initial high-sensitivity troponin negative. Initial EKG shows afib w/ RVR.   - Serial trops pending - Second trop returned elevated at 32, will discuss w/ Cardiology and start Heparin infusion while awaiting 3rd HS troponin  - continue ASA   - echo as above    Diabetes mellitus, type II, diet controlled  Hgb A1c 6.6% on 9/15/23  - recheck Hgb A1c     Unhealthy Alcohol Use (male w/ ETOH intake > 14 drinks/week)  Pt reports 2-3 glasses of wine per day, one bottle of wine lasting 1-2 days at most.   - counseled on need for reduction vs cessation    Mild, nonspecific leukocytosis  WBC 12.6.  Patient denies any fever/chills, infectious symptoms or  "signs.  -Monitor, recheck CBC tomorrow a.m.    Moderate Obstructive Sleep Apnea and Sleep Associated Hypoxemia, Compliant  Seen virtually by Sleep Medicine 05/2020, prescribed auto CPAP with pressure settings between 5-15 cm H2O. Followed up 04/2020 and noted to have poor sleep compliance and nasal congestion.   - pt has now been compliant w/ CPAP    Depression  Has had additional stress with family/friend deaths recently  - continue PTA celexa     Dyslipidemia  Lipid panel improved with rouvastatin and zetia, LDL significantly decreased. Had muscle aching w/ atorvastatin.   - hold PTA regimen in light of obs status     GERD, uncontrolled   Hx of Bleeding Duodenal Ulcer requiring Transfusion, 1983  Has had frequent indigestion requiring nearly daily Pepcid use. Denies any recent abnormal bleeding. Hgb stable at 14.8.   - start Protonix prophylactic     Post Operative Acute urinary retention, 09/2022  Requiring ED visit for escobar catheter placement postoperatively, patient was started on tamsulosin and retention resolved without further work-up.  Patient was evaluated by urology on 9/21/2022.  - no further retention or urinary symptoms     Plaque psoriasis  Examined by dermatology on 2/9/2023, patient was started on Dupixent injections and many of his psoriasis plaques were injected with steroids.     Diet:  Cardiac   DVT Prophylaxis: Enoxaparin (Lovenox) SQ  Escobar Catheter: Not present  Lines: None     Cardiac Monitoring: None  Code Status:   Full Code     Clinically Significant Risk Factors Present on Admission                       # Overweight: Estimated body mass index is 29.65 kg/m  as calculated from the following:    Height as of this encounter: 1.727 m (5' 8\").    Weight as of this encounter: 88.5 kg (195 lb).           Disposition Plan      Expected Discharge Date: 02/11/2023                The patient's care was discussed with the Attending Physician, Dr. Roberts, Patient and Patient's Family.    Keyla JENSEN" JEFFREY [facility]  Hospitalist Service  [facility]  Securely message with [employer] (more info)  Text page via [facility] Paging/Directory     ______________________________________________________________________    Chief Complaint    Chest pressure, indigestion     History is obtained from the patient    History of Present Illness   Hermelindo Duffy is a 62 year old male who has a past medical history including CAD, GERD, ASCVD, and KIMBERLY who presents to the emergency department with chest pressure, palpitations, found to be in atrial fibrillation with RVR at 220 bpm.    Patient awoke at approximately 3 AM with bad indigestion, he took some Pepcid, was sitting up in the living room and at approximately 5 AM, the indigestion worsened, and was accompanied by jaw pain at which point he woke his wife and summoned EMS.  EMS found the patient to be hemodynamically stable, with the exception of atrial fibrillation with a rate of 220.  He was treated with 3 nitroglycerin for his chest pressure, 12 mg adenosine IV without any change to the rhythm.  There was question of DCCV, however the patient remained hemodynamically stable was transported the emergency department for further work-up.     In the emergency department labs demonstrate unremarkable BMP, elevated glucose at 142, normal/negative high-sensitivity troponin, mild nonspecific leukocytosis with a white blood cell count of 12.6, otherwise unremarkable CBC.  TSH 2.48. EKG shows afib RVR, rate 200+.      I examined the patient emergency department with his wife at the bedside.  The patient states over the past several weeks he has had quite a bit of stress due to unexpected death in their family and his [employer] where he is a .  He denies any infectious symptoms including fever/chills, cough, congestion, nausea/vomiting, urinary issues.  He has had worsening plaque psoriasis treated with Dupixent and steroid injections by dermatology.      Past  Medical History    Past Medical History:   Diagnosis Date     Acute duodenal ulcer with bleeding 1980s     Coronary artery disease      Eczema      Hernia, abdominal      History of angina      Hyperlipidemia LDL goal < 70     intolerant to high dose statins     Mild major depression (H)      Mumps      Sleep apnea      Stented coronary artery        Past Surgical History   Past Surgical History:   Procedure Laterality Date     ANGIOPLASTY  08/01/2010    LAD PTCA/JIA     CYSTOSCOPY       HERNIORRHAPHY INGUINAL      right, x2     HERNIORRHAPHY UMBILICAL N/A 09/15/2022    Procedure: OPEN UMBILICAL HERNIA REPAIR;  Surgeon: Violetta Eldridge MD;  Location: [facility] OR     TESTICLE SURGERY         Prior to Admission Medications   Prior to Admission Medications   Prescriptions Last Dose Informant Patient Reported? Taking?   Dupilumab (DUPIXENT) 300 MG/2ML syringe 2/9/2023  Yes Yes   Sig: Inject 300 mg Subcutaneous every 14 days   aspirin 81 MG EC tablet 2/10/2023 at am  Yes Yes   Sig: Take 81 mg by mouth daily   buPROPion (WELLBUTRIN XL) 150 MG 24 hr tablet 2/9/2023 at am  No Yes   Sig: Take 1 tablet (150 mg) by mouth every morning   cetirizine (ZYRTEC) 10 MG tablet Past Month  Yes Yes   Sig: Take 10 mg by mouth daily as needed (For itching)   citalopram (CELEXA) 20 MG tablet 2/9/2023 at am  No Yes   Sig: Take 1 tablet (20 mg) by mouth daily   doxycycline hyclate (VIBRAMYCIN) 50 MG capsule 2/9/2023 at am  Yes Yes   Sig: Take 50 mg by mouth daily   ezetimibe (ZETIA) 10 MG tablet 2/9/2023 at am  No Yes   Sig: Take 1 tablet (10 mg) by mouth daily Appointment required for further refills   famotidine (PEPCID) 20 MG tablet 2/10/2023 at am  Yes Yes   Sig: Take 20 mg by mouth daily as needed   fluticasone (FLONASE) 50 MCG/ACT spray Past Week  No Yes   Sig: Spray 2 sprays into both nostrils daily   Patient taking differently: Spray 2 sprays into both nostrils daily as needed   ibuprofen (ADVIL/MOTRIN) 200 MG tablet   No No   Sig:  Take 3 tablets (600 mg) by mouth every 6 hours as needed for moderate pain   Patient taking differently: Take 400-600 mg by mouth every 6 hours as needed for moderate pain (4-6)   rosuvastatin (CRESTOR) 20 MG tablet 2/9/2023 at am  No Yes   Sig: Take 1 tablet (20 mg) by mouth daily      Facility-Administered Medications: None        Social History   I have reviewed this patient's social history and updated it with pertinent information if needed.  Social History     Tobacco Use     Smoking status: Never     Smokeless tobacco: Never   Vaping Use     Vaping Use: Never used   Substance Use Topics     Alcohol use: Yes     Alcohol/week: 7.0 standard drinks     Types: 7 Glasses of wine per week     Comment: 1-2 glasses wine day     Drug use: No       Family History   I have reviewed this patient's family history and updated it with pertinent information if needed.  Family History   Problem Relation Age of Onset     Heart Disease Father         MI approx age 63     Cerebrovascular Disease Paternal Uncle      Diabetes No family hx of      Hypertension No family hx of         Physical Exam   Vital Signs: Temp: 97  F (36.1  C) Temp src: Temporal BP: 105/73 Pulse: 82   Resp: 17 SpO2: 98 % O2 Device: None (Room air)    Weight: 195 lbs 0 oz    Physical Exam  Vitals and nursing note reviewed.   Constitutional:       General: He is not in acute distress.     Appearance: Normal appearance. He is not ill-appearing.   Eyes:      Pupils: Pupils are equal, round, and reactive to light.   Cardiovascular:      Rate and Rhythm: Tachycardia present. Rhythm irregularly irregular.      Heart sounds: Heart sounds not distant. No murmur heard.  Pulmonary:      Effort: Pulmonary effort is normal. No tachypnea or respiratory distress.      Breath sounds: Normal breath sounds.   Abdominal:      General: Abdomen is protuberant.      Palpations: Abdomen is soft.      Tenderness: There is no abdominal tenderness.   Musculoskeletal:      Right lower  leg: No edema.      Left lower leg: No edema.   Skin:     General: Skin is warm and dry.   Neurological:      General: No focal deficit present.      Mental Status: He is alert.      GCS: GCS eye subscore is 4. GCS verbal subscore is 5. GCS motor subscore is 6.   Psychiatric:         Attention and Perception: Attention normal.         Mood and Affect: Mood normal.         Behavior: Behavior is cooperative.       Medical Decision Making     70 MINUTES SPENT BY ME on the date of service doing chart review, history, exam, documentation & further activities per the note.      Data     I have personally reviewed the following data over the past 24 hrs:    12.6 (H)  \   14.8   / 200     138 105 19.6 /  142 (H)   4.4 23 1.04 \       Trop: 9 BNP: N/A       TSH: 2.48 T4: N/A A1C: N/A       Imaging results reviewed over the past 24 hrs:   Recent Results (from the past 24 hour(s))   XR Chest 2 Views    Narrative    XR CHEST 2 VIEWS 2/10/2023 8:26 AM    HISTORY: chest pain    COMPARISON: 10/7/2021      Impression    IMPRESSION: No acute cardiopulmonary process.    CELINA ROCA MD         SYSTEM ID:  [id number]

## 2023-02-10 NOTE — PROGRESS NOTES
RECEIVING UNIT ED HANDOFF REVIEW    ED Nurse Handoff Report was reviewed by: Jason Nicholas RN on February 10, 2023 at 2:35 PM

## 2023-02-10 NOTE — CONSULTS
Patient has through an employer with $3000 deductible.    Note: Eliquis and use of its associated copay card make it much more affordable than Xarelto for patients with high deductibles such as this.  Details below.     Xarelto:  $531/mo. Upon receipt of RX Discharge Pharmacy can provide copay savings card to reduce this to $10 per fill (up to a 90 day supply) for the first 90 days, and then $331/mo thereafter.      Eliquis:  $549/mo. Upon receipt of RX Discharge Pharmacy can provide copay savings card to reduce this to $10 per month ongoing  The card covers a maximum of $6,400 per year.          Marce Pulido  Pharmacy Technician/Liaison, Discharge Pharmacy   [phone] (voice or text)  [email or web address]

## 2023-02-10 NOTE — ED TRIAGE NOTES
Woke up around 0630 feeling indigestion, chest pressure. Nitroglycerin sl given x 3 per EMS helped with chest pressure. Patient took full dose ASA at home. Hx of cardiac stent placement 2010, having new onset of A fib. Adenosine 12 mg givne en route failed to convert.    Triage Assessment     Row Name 02/10/23 0810       Triage Assessment (Adult)    Airway WDL WDL       Respiratory WDL    Respiratory WDL WDL       Cardiac WDL    Cardiac WDL X;chest pain       Peripheral/Neurovascular WDL    Peripheral Neurovascular WDL WDL       Cognitive/Neuro/Behavioral WDL    Cognitive/Neuro/Behavioral WDL WDL

## 2023-02-10 NOTE — CONSULTS
[facility]    Cardiology Consultation     Date of Admission:  2/10/2023    Assessment & Plan   Hermelindo Duffy is a 62 year old male who was admitted on 2/10/2023.    1.  Atrial fibrillation with RVR.  New diagnosis. Suspect started this am due to abrupt onset symptoms. Symptomatic with palpitations and chest pain when tachycardic.  -  [id number] is 1 for DM but he will be 63 in September.   2.  CAD. With stenting of the LAD in 2010. He had minimal diease elsewhere at that time. He has done well since that time without recurrent angina.  -  Continue aspirin, rosuvastatin, zetia.  -  Initial troponin negative here - trend.   3.  Regular alcohol use. Drinking 2 - 3 glasses of wine per day.  4.  DM type II. Diet controlled. A1c 6.6 in September.   5.  Treated KIMBERLY.  6.  Hx remote duodenal ulcer in the 80s. No concerns for any recent bleeding.   -  Started on PPI.   7.   Psoriasis.     Plan:  1.  Discussed atrial fibrillation in detail. Continue metoprolol for rate control. Started on 25 mg BID - titrate as needed.  2.  Would recommend starting anticoagulation. We discussed this. Will consult pharmacy for DOAC cost.   3.  Echocardiogram ordered - f/u results.   4.  If improved and heart rates are controlled tomorrow, can likely discharge tomorrow with outpatient follow up. After several weeks or anticoagulation would recommend consideration of elective cardioversion if he remains in atrial fibrillation.   5.  Discussed significantly cutting back on alcohol intake.     Melany Daniel PA-C, MD    Primary Care Physician   Tha Ortega    Reason for Consult   Reason for consult: I was asked by Keyla Khalil NP to evaluate this patient for afib with RVR.    History of Present Illness   Hermelindo Duffy is a 62 year old male who presents with new atrial fibrillation with RVR. He has a history of CAD s/p stenting of the LAD in 2010 with minimal disease elsewhere, hyperlipidemia, remote  "duodenal ulcer, moderate KIMBERLY which is treated.     From a cardiac perspective, he has done well following his stenting in 2010 without recurrent anginal symptoms. He is maintained on aspirin.    Today, he awoke around 0630 this morning with sudden onset chest pressure. He otherwise has been in his usual state of health the past few days. He thought perhaps his symptoms were from indigestion. He took an antacid and when to sit in the chair. He then developed palpitations and his pain radiated to his jaw and teeth. He thus call 911. He was noted to be in rapid afib on EMS arrival. He was treated with nitroglycerin x 3 and 12 mg of adenosine. Cardioversion was considered however he reportedly improved with vasovagal maneuvers. Upon arrival in the ED, he had some mild residual chest pressure. No prior history of arrhythmias.     Work up in the ED has been relatively unremarkable other than mild WBC elevation of 12.  Renal function is normal. CXR unremarkable. TSH was normal. Initial troponin was negative. He was treated with IVF boluses as well as a dose of IV diltiazem with improvement in his heart rates. EKG initially at a rate of 150 showed some anterior and inferior ST depression. On subsequent EKG with a HR of 98 ST depression had resolved. He is being admitted for observation.     On my assessment, his heart rate was in the 100 - 110s on telemetry. He was feeling better but had some mild residual jaw pain and didn't feel \"quite right.\" His chest discomfort has resolved. He just received metoprolol tartrate 25 mg prior to my arrival. He has not been started on anticoagulation.    He has a remote duodenal ulcer with no concerns or history of any recent bleeding. He drinks wine (1-2 glasses) daily.     Past Medical History   Past Medical History:   Diagnosis Date     Acute duodenal ulcer with bleeding 1980s     Coronary artery disease      Eczema      Hernia, abdominal      History of angina      Hyperlipidemia LDL " goal < 70     intolerant to high dose statins     Mild major depression (H)      Mumps      Sleep apnea      Stented coronary artery          Past Surgical History   Past Surgical History:   Procedure Laterality Date     ANGIOPLASTY  08/01/2010    LAD PTCA/JIA     CYSTOSCOPY       HERNIORRHAPHY INGUINAL      right, x2     HERNIORRHAPHY UMBILICAL N/A 09/15/2022    Procedure: OPEN UMBILICAL HERNIA REPAIR;  Surgeon: Violetta Eldridge MD;  Location: [facility] OR     TESTICLE SURGERY         Prior to Admission Medications   Prior to Admission Medications   Prescriptions Last Dose Informant Patient Reported? Taking?   Dupilumab (DUPIXENT) 300 MG/2ML syringe 2/9/2023  Yes Yes   Sig: Inject 300 mg Subcutaneous every 14 days   aspirin 81 MG EC tablet 2/10/2023 at am  Yes Yes   Sig: Take 81 mg by mouth daily   buPROPion (WELLBUTRIN XL) 150 MG 24 hr tablet 2/9/2023 at am  No Yes   Sig: Take 1 tablet (150 mg) by mouth every morning   cetirizine (ZYRTEC) 10 MG tablet Past Month  Yes Yes   Sig: Take 10 mg by mouth daily as needed (For itching)   citalopram (CELEXA) 20 MG tablet 2/9/2023 at am  No Yes   Sig: Take 1 tablet (20 mg) by mouth daily   doxycycline hyclate (VIBRAMYCIN) 50 MG capsule 2/9/2023 at am  Yes Yes   Sig: Take 50 mg by mouth daily   ezetimibe (ZETIA) 10 MG tablet 2/9/2023 at am  No Yes   Sig: Take 1 tablet (10 mg) by mouth daily Appointment required for further refills   famotidine (PEPCID) 20 MG tablet 2/10/2023 at am  Yes Yes   Sig: Take 20 mg by mouth daily as needed   fluticasone (FLONASE) 50 MCG/ACT spray Past Week  No Yes   Sig: Spray 2 sprays into both nostrils daily   Patient taking differently: Spray 2 sprays into both nostrils daily as needed   ibuprofen (ADVIL/MOTRIN) 200 MG tablet   No No   Sig: Take 3 tablets (600 mg) by mouth every 6 hours as needed for moderate pain   Patient taking differently: Take 400-600 mg by mouth every 6 hours as needed for moderate pain (4-6)   rosuvastatin (CRESTOR) 20 MG  "tablet 2/9/2023 at am  No Yes   Sig: Take 1 tablet (20 mg) by mouth daily      Facility-Administered Medications: None     Current Facility-Administered Medications   Medication Dose Route Frequency     aspirin  81 mg Oral Daily     [START ON 2/11/2023] buPROPion  150 mg Oral QAM     citalopram  20 mg Oral Daily     metoprolol tartrate  25 mg Oral BID     [START ON 2/11/2023] pantoprazole  40 mg Oral QAM AC     Current Facility-Administered Medications   Medication Last Rate     Reason anticoagulant not prescribed for atrial fibrillation       Allergies   Allergies   Allergen Reactions     Atorvastatin      Muscle aches     Questran [Cholestyramine]      Nausea       Social History    reports that he has never smoked. He has never used smokeless tobacco. He reports current alcohol use of about 7.0 standard drinks per week. He reports that he does not use drugs.     Family History   I have reviewed this patient's family history and updated it with pertinent information if needed.  Family History   Problem Relation Age of Onset     Heart Disease Father         MI approx age 63     Cerebrovascular Disease Paternal Uncle      Diabetes No family hx of      Hypertension No family hx of           Review of Systems   A comprehensive review of system was performed and is negative other than that noted in the HPI or here.     Physical Exam   Vital Signs with Ranges  Temp:  [97  F (36.1  C)] 97  F (36.1  C)  Pulse:  [] 105  Resp:  [10-18] 18  BP: ()/(66-99) 112/71  SpO2:  [94 %-98 %] 97 %  Wt Readings from Last 4 Encounters:   02/10/23 88.5 kg (195 lb)   09/21/22 88.5 kg (195 lb)   09/15/22 89.8 kg (198 lb)   09/13/22 90.7 kg (200 lb)     No intake/output data recorded.      Vitals: /71   Pulse 105   Temp 97  F (36.1  C) (Temporal)   Resp 18   Ht 1.727 m (5' 8\")   Wt 88.5 kg (195 lb)   SpO2 97%   BMI 29.65 kg/m      Physical Exam:   General - Alert and oriented to time place and person in no acute " distress  Eyes - No scleral icterus  HEENT - Neck supple, moist mucous membranes  Cardiovascular - IRR, mildly tachycardic. No murmur  Extremities - There is no edema  Respiratory - CTAB  Skin - No pallor or cyanosis  Gastrointestinal - Non tender and non distended without rebound or guarding  Psych - Appropriate affect   Neurological - No gross motor neurological focal deficits    No lab results found in last 7 days.    Invalid input(s): TROPONINIES    Recent Labs   Lab 02/10/23  0813   WBC 12.6*   HGB 14.8   MCV 96         POTASSIUM 4.4   CHLORIDE 105   CO2 23   BUN 19.6   CR 1.04   GFRESTIMATED 80   ANIONGAP 10   FABRICIO 9.4   *     Recent Labs   Lab Test 04/22/22  0757 01/29/21  0955 10/06/15  1342 05/20/15  0904   CHOL 150 147   < > 224*   HDL 46 44   < > 46   LDL 78 62   < > 135   TRIG 128 207*   < > 215*   CHOLHDLRATIO  --   --   --  4.9*    < > = values in this interval not displayed.     Recent Labs   Lab 02/10/23  0813   WBC 12.6*   HGB 14.8   HCT 44.1   MCV 96        No results for input(s): PH, PHV, PO2, PO2V, SAT, PCO2, PCO2V, HCO3, HCO3V in the last 168 hours.  No results for input(s): NTBNPI, NTBNP in the last 168 hours.  No results for input(s): DD in the last 168 hours.  No results for input(s): SED, CRP in the last 168 hours.  Recent Labs   Lab 02/10/23  0813        Recent Labs   Lab 02/10/23  0813   TSH 2.48     No results for input(s): COLOR, APPEARANCE, URINEGLC, URINEBILI, URINEKETONE, SG, UBLD, URINEPH, PROTEIN, UROBILINOGEN, NITRITE, LEUKEST, RBCU, WBCU in the last 168 hours.    Imaging:  Recent Results (from the past 48 hour(s))   XR Chest 2 Views    Narrative    XR CHEST 2 VIEWS 2/10/2023 8:26 AM    HISTORY: chest pain    COMPARISON: 10/7/2021      Impression    IMPRESSION: No acute cardiopulmonary process.    CELINA ROCA MD         SYSTEM ID:  [id number]       Echo:  No results found for this or any previous visit (from the past 4320 hour(s)).    Clinically  "Significant Risk Factors Present on Admission                  # Overweight: Estimated body mass index is 29.65 kg/m  as calculated from the following:    Height as of this encounter: 1.727 m (5' 8\").    Weight as of this encounter: 88.5 kg (195 lb).    Cardiovascular: Cardiac Arrhythmia: Atrial fibrillation: Unspecified                 "

## 2023-02-10 NOTE — ED NOTES
[facility]  ED Nurse Handoff Report    ED Chief complaint: Chest Pain and Atrial Fib      ED Diagnosis:   Final diagnoses:   New onset atrial fibrillation (H)       Code Status: Full Code    Allergies:   Allergies   Allergen Reactions    Atorvastatin      Muscle aches    Questran [Cholestyramine]      Nausea       Patient Story: chest pain, A fib  Focused Assessment:  Patient with a history of MI with stent placement presents to ED via EMS for new onset of A fib.     Treatments and/or interventions provided: see MAR  Patient's response to treatments and/or interventions: TBD    To be done/followed up on inpatient unit:  close monitor    Does this patient have any cognitive concerns?:  na    Activity level - Baseline/Home:  Independent  Activity Level - Current:   Stand with Assist    Patient's Preferred language: English   Needed?: No    Isolation: None  Infection: Not Applicable  Patient tested for COVID 19 prior to admission: NO  Bariatric?: No    Vital Signs:   Vitals:    02/10/23 0842 02/10/23 0900 02/10/23 0910 02/10/23 0911   BP: 94/66 94/66 105/73    Pulse: 95 97 98    Resp: 17 17     Temp:       TempSrc:       SpO2: 98% 98%  97%   Weight:       Height:           Cardiac Rhythm:     Was the PSS-3 completed:   Yes  What interventions are required if any?               Family Comments: wife and son at bedside  OBS brochure/video discussed/provided to patient/family:YES              Name of person given brochure if not patient: na              Relationship to patient: na    For the majority of the shift this patient's behavior was Green.   Behavioral interventions performed were none.    ED NURSE PHONE NUMBER: *[id number]

## 2023-02-10 NOTE — ED PROVIDER NOTES
History     Chief Complaint:  Chest Pain and Atrial Fib       The history is provided by the patient and the EMS personnel.      Hermelindo Duffy is a 62 year old male with a history of arteriosclerotic heart disease, CAD, angina, and GERD who presents via EMS from home with sudden onset of 5/10 chest pressure when he woke up this morning at 0630, approximately 1.5 hours ago. He reports feeling well yesterday during the day and prior to going to bed. When he woke up, he initially believed this was consistent with indigestion, but he soon developed shortness of breath. He took a full dose of Aspirin prior to EMS arrival. Per EMS, he was found to be in atrial fibrillation with RVR at 220 bpm.  His blood sugar level was 162, per EMS. He was given 3 nitroglycerin and adenosine (12 mg). EMS considered need for a cardioversion, but the patient improved with vasovagal maneuvers. Patient reports improvement of shortness of breath and current 2/10 chest pressure here in the ED after interventions. He denies history of similar symptoms and history of atrial fibrillation and other arrhythmias. He did have a stent placed in 2010 but he denies use of blood thinners other than Aspirin (81 mg). He denies recent changes in medications but notes he started a new medication injection and is first dose of yesterday. He denies dizziness, lightheadedness, nausea, vomiting, leg swelling, and recent syncope.      Independent Historian:   EMS - They report initial history upon arrival in the ED room.    Review of External Notes: Prior stress echo results from 2018 reviewed    ROS:  Review of Systems   Respiratory: Positive for shortness of breath.    Cardiovascular: Positive for chest pain. Negative for leg swelling.   Gastrointestinal: Negative for nausea and vomiting.   Neurological: Negative for dizziness, syncope and light-headedness.   All other systems reviewed and are negative.      Allergies:  Atorvastatin  Questran  "[Cholestyramine]     Medications:    Aspirin  Bupropion  Celexa  Dupixent  Zetia  Omeprazole  Rosuvastatin  Flomax  Lisinopril     Past Medical History:    CAD  Angina  Hyperlipidemia  Depression  GERD  KIMBERLY  Type 2 diabetes   Arteriosclerotic heart disease    Past Surgical History:  Angioplasty   Cystoscopy  Herniorrhaphy inguinal x2, right  Herniorrhaphy umbilical  Testicle surgery  Coronary stent placement       Family History:    MI  COPD  CVA    Social History:  The patient presents via EMS  Patient reports drinking beer x1 daily  Patient denies cigarette or drug use  PCP: Tha Ortega     Physical Exam     Patient Vitals for the past 24 hrs:   BP Temp Temp src Pulse Resp SpO2 Height Weight   02/10/23 0927 -- -- -- 82 17 98 % -- --   02/10/23 0912 105/73 -- -- 98 -- -- -- --   02/10/23 0911 -- -- -- -- -- 97 % -- --   02/10/23 0910 105/73 -- -- 98 -- -- -- --   02/10/23 0900 94/66 -- -- 97 17 98 % -- --   02/10/23 0842 94/66 -- -- 95 17 98 % -- --   02/10/23 0840 -- -- -- 99 15 98 % -- --   02/10/23 0825 94/79 -- -- -- -- -- -- --   02/10/23 0810 (!) 158/95 -- -- (!) 149 13 97 % -- --   02/10/23 0808 (!) 164/99 97  F (36.1  C) Temporal (!) 138 15 96 % 1.727 m (5' 8\") 88.5 kg (195 lb)        Physical Exam  General: Nontoxic. Resting comfortably  Head:  Scalp, face, and head appear normal  Eyes:  Pupils are equal, round    Conjunctivae non-injected and sclerae white  ENT:    The external nose is normal    Pinnae are normal  Neck:  Normal range of motion    There is no rigidity noted    Trachea is in the midline  CV:  Rate tachycardic, rhythm irregularly irregular     Normal S1/S2, no S3/S4    No murmur or rub. Radial pulses 2+ bilaterally.  Resp:  Lungs are clear and equal bilaterally  There is no tachypnea    No increased work of breathing    No rales, wheezing, or rhonchi  GI:  Abdomen is soft, obese, no rigidity or guarding    No distension, or mass    No tenderness or rebound tenderness   MS:  Normal " muscular tone    Symmetric motor strength    No lower extremity edema. No calf swelling or tenderness.  Skin:  No rash or acute skin lesions noted  Neuro: Awake and alert  Speech is normal and fluent  Moves all extremities spontaneously  Psych:  Normal affect. Appropriate interactions.      Emergency Department Course   ECG  ECG taken at 0800, ECG read at 0807  Critical Test Result: High HR  Atrial fibrillation with rapid ventricular response  ST & T wave abnormality, consider inferior ischemia  ST & T wave abnormality, consider anterior ischemia  Abnormal ECG   Rate 150 bpm. OH interval * ms. QRS duration 74 ms. QT/QTc 278/439 ms. P-R-T axes * 86 -57.       Imaging:  XR Chest 2 Views   Final Result   IMPRESSION: No acute cardiopulmonary process.      CELINA ROCA MD            SYSTEM ID:  [id number]         Report per radiology    Laboratory:  Labs Ordered and Resulted from Time of ED Arrival to Time of ED Departure   BASIC METABOLIC PANEL - Abnormal       Result Value    Sodium 138      Potassium 4.4      Chloride 105      Carbon Dioxide (CO2) 23      Anion Gap 10      Urea Nitrogen 19.6      Creatinine 1.04      Calcium 9.4      Glucose 142 (*)     GFR Estimate 80     CBC WITH PLATELETS AND DIFFERENTIAL - Abnormal    WBC Count 12.6 (*)     RBC Count 4.61      Hemoglobin 14.8      Hematocrit 44.1      MCV 96      MCH 32.1      MCHC 33.6      RDW 12.6      Platelet Count 200      % Neutrophils 83      % Lymphocytes 8      % Monocytes 8      % Eosinophils 0      % Basophils 0      % Immature Granulocytes 1      NRBCs per 100 WBC 0      Absolute Neutrophils 10.5 (*)     Absolute Lymphocytes 1.0      Absolute Monocytes 1.0      Absolute Eosinophils 0.0      Absolute Basophils 0.0      Absolute Immature Granulocytes 0.1      Absolute NRBCs 0.0     MAGNESIUM - Normal    Magnesium 2.2     TROPONIN T, HIGH SENSITIVITY - Normal    Troponin T, High Sensitivity 9     TROPONIN T, HIGH SENSITIVITY        Emergency Department  Course & Assessments:    Interventions:  Medications   diltiazem (CARDIZEM) injection 25 mg (25 mg Intravenous Given 2/10/23 0836)   0.9% sodium chloride BOLUS (0 mLs Intravenous Stopped 2/10/23 0854)   0.9% sodium chloride BOLUS (1,000 mLs Intravenous New Bag 2/10/23 0855)        Independent Interpretation (X-rays, CTs, rhythm strip)/Assessments:  Consultations/Discussion of Management or Tests:     ED Course as of 02/10/23 1010   Fri Feb 10, 2023   0757 Patient arrived in the ED room with EMS. I gathered history and examined the patient as noted above.   0838 I performed an independent interpretation of the patient's chest radiograph.  No pneumothorax, pleural effusions or consolidation.   0849 I rechecked the patient and explained findings. We discussed plans for admission, and the patient and his family are in agreement.   1006 I consulted with Dr. Roberts, hospitalist, regarding the patient's history and presentation here in the emergency department who accepted the patient for admission.       Social Determinants of Health affecting care:   None    Disposition:  The patient was admitted to the hospital under the care of Dr. Roberts.     Impression & Plan      Medical Decision Making:  Hermelindo Duffy is a 62 year old male who presents for evaluation of sudden onset chest tightness and palpitations this morning.  On EMS arrival he was found to be in atrial fibrillation with RVR.  Patient was given 12 mg of adenosine prior to arrival with no change in rhythm.  On arrival to the emergency department he is hemodynamically stable otherwise but does present with new onset atrial fibrillation and rapid ventricular response.  Patient had significant chest tightness associated with this and has a known history of coronary artery disease with prior coronary angioplasty.  He took aspirin at home prior to arrival and received 3 sublingual nitroglycerin tablets by EMS.  This did improve his chest discomfort.  He is not  anticoagulated but takes a daily baby aspirin.  Patient was treated with IV fluids and diltiazem and had improvement in his heart rate but remained in atrial fibrillation.  Initial troponin is normal.  No significant metabolic or electrolyte disturbance.  No anemia.  Mild nonspecific leukocytosis.  No fever or other signs or symptoms to suggest infection.  Chest x-ray unremarkable without pulmonary edema, pleural effusions or evidence of CHF.  Given the findings patient be admitted to the hospital for ongoing monitoring evaluation and treatment.  The case was discussed with the hospitalist and the patient was admitted in stable condition.    Diagnosis:    ICD-10-CM    1. New onset atrial fibrillation (H)  I48.91              Scribe Disclosure:  Laury OLIVEROS, luna serving as a scribe at 8:16 AM on 2/10/2023 to document services personally performed by German Wagner MD based on my observations and the provider's statements to me.     2/10/2023   German Wagner MD Daro, Ryan Clay, MD  02/10/23 1011

## 2023-02-11 ENCOUNTER — APPOINTMENT (OUTPATIENT)
Dept: CARDIOLOGY | Facility: CLINIC | Age: 63
End: 2023-02-11
Attending: NURSE PRACTITIONER
Payer: COMMERCIAL

## 2023-02-11 VITALS
RESPIRATION RATE: 16 BRPM | SYSTOLIC BLOOD PRESSURE: 116 MMHG | OXYGEN SATURATION: 99 % | WEIGHT: 195 LBS | BODY MASS INDEX: 29.55 KG/M2 | DIASTOLIC BLOOD PRESSURE: 76 MMHG | TEMPERATURE: 98.3 F | HEIGHT: 68 IN | HEART RATE: 69 BPM

## 2023-02-11 LAB
ANION GAP SERPL CALCULATED.3IONS-SCNC: 9 MMOL/L (ref 7–15)
BUN SERPL-MCNC: 16.6 MG/DL (ref 8–23)
CALCIUM SERPL-MCNC: 9.3 MG/DL (ref 8.8–10.2)
CHLORIDE SERPL-SCNC: 102 MMOL/L (ref 98–107)
CREAT SERPL-MCNC: 1.21 MG/DL (ref 0.67–1.17)
DEPRECATED HCO3 PLAS-SCNC: 27 MMOL/L (ref 22–29)
ERYTHROCYTE [DISTWIDTH] IN BLOOD BY AUTOMATED COUNT: 12.8 % (ref 10–15)
GFR SERPL CREATININE-BSD FRML MDRD: 67 ML/MIN/1.73M2
GLUCOSE SERPL-MCNC: 126 MG/DL (ref 70–99)
HBA1C MFR BLD: 5.9 %
HCT VFR BLD AUTO: 44.5 % (ref 40–53)
HGB BLD-MCNC: 14.7 G/DL (ref 13.3–17.7)
LVEF ECHO: NORMAL
MCH RBC QN AUTO: 31.7 PG (ref 26.5–33)
MCHC RBC AUTO-ENTMCNC: 33 G/DL (ref 31.5–36.5)
MCV RBC AUTO: 96 FL (ref 78–100)
PLATELET # BLD AUTO: 188 10E3/UL (ref 150–450)
POTASSIUM SERPL-SCNC: 4.4 MMOL/L (ref 3.4–5.3)
RBC # BLD AUTO: 4.63 10E6/UL (ref 4.4–5.9)
SODIUM SERPL-SCNC: 138 MMOL/L (ref 136–145)
UFH PPP CHRO-ACNC: 0.24 IU/ML
WBC # BLD AUTO: 13.1 10E3/UL (ref 4–11)

## 2023-02-11 PROCEDURE — 85027 COMPLETE CBC AUTOMATED: CPT | Performed by: NURSE PRACTITIONER

## 2023-02-11 PROCEDURE — 93306 TTE W/DOPPLER COMPLETE: CPT

## 2023-02-11 PROCEDURE — G0378 HOSPITAL OBSERVATION PER HR: HCPCS

## 2023-02-11 PROCEDURE — 36415 COLL VENOUS BLD VENIPUNCTURE: CPT | Performed by: NURSE PRACTITIONER

## 2023-02-11 PROCEDURE — 80048 BASIC METABOLIC PNL TOTAL CA: CPT | Performed by: NURSE PRACTITIONER

## 2023-02-11 PROCEDURE — 99239 HOSP IP/OBS DSCHRG MGMT >30: CPT | Performed by: NURSE PRACTITIONER

## 2023-02-11 PROCEDURE — 93306 TTE W/DOPPLER COMPLETE: CPT | Mod: 26 | Performed by: INTERNAL MEDICINE

## 2023-02-11 PROCEDURE — 85520 HEPARIN ASSAY: CPT | Performed by: INTERNAL MEDICINE

## 2023-02-11 PROCEDURE — 250N000013 HC RX MED GY IP 250 OP 250 PS 637: Performed by: NURSE PRACTITIONER

## 2023-02-11 PROCEDURE — 96366 THER/PROPH/DIAG IV INF ADDON: CPT

## 2023-02-11 PROCEDURE — 83036 HEMOGLOBIN GLYCOSYLATED A1C: CPT | Performed by: NURSE PRACTITIONER

## 2023-02-11 RX ORDER — PANTOPRAZOLE SODIUM 40 MG/1
40 TABLET, DELAYED RELEASE ORAL
Qty: 30 TABLET | Refills: 0 | Status: SHIPPED | OUTPATIENT
Start: 2023-02-12 | End: 2023-03-14

## 2023-02-11 RX ORDER — METOPROLOL TARTRATE 25 MG/1
25 TABLET, FILM COATED ORAL 2 TIMES DAILY
Qty: 60 TABLET | Refills: 0 | Status: SHIPPED | OUTPATIENT
Start: 2023-02-11 | End: 2023-02-24

## 2023-02-11 RX ADMIN — BUPROPION HYDROCHLORIDE 150 MG: 150 TABLET, FILM COATED, EXTENDED RELEASE ORAL at 08:12

## 2023-02-11 RX ADMIN — CITALOPRAM HYDROBROMIDE 20 MG: 20 TABLET ORAL at 08:12

## 2023-02-11 RX ADMIN — METOPROLOL TARTRATE 25 MG: 25 TABLET, FILM COATED ORAL at 08:12

## 2023-02-11 RX ADMIN — PANTOPRAZOLE SODIUM 40 MG: 40 TABLET, DELAYED RELEASE ORAL at 08:12

## 2023-02-11 RX ADMIN — ASPIRIN 81 MG: 81 TABLET, COATED ORAL at 08:13

## 2023-02-11 ASSESSMENT — ACTIVITIES OF DAILY LIVING (ADL)
ADLS_ACUITY_SCORE: 31

## 2023-02-11 NOTE — DISCHARGE SUMMARY
[facility]  Hospitalist Discharge Summary      Date of Admission:  2/10/2023  Date of Discharge:  2/11/2023  Discharging Provider: JEFFREY Suggs CNP  Discharge Service: Hospitalist Service    Discharge Diagnoses   #1.  New onset atrial fibrillation with RVR -spontaneous self converted.  Echocardiogram recommended by cardiologist, however echo could not be done today.  Heparin drip discontinued and bridged to Eliquis.  Patient requested to have echocardiogram as outpatient and okay by cardiologist.  Echocardiogram ordered by cardiologist team to be done as outpatient.  #2.  CAD with history of status post previous PCI -  #3.  Diabetes type 2 -diet and exercise controlled  #4.  Obstructive sleep apnea -on treatment and compliant with treatment.      Follow-ups Needed After Discharge   Follow up with primary doctor and cardiology.    Unresulted Labs Ordered in the Past 30 Days of this Admission     No orders found for last 31 day(s).      These results will be followed up by primary doctor or cardiologist.    Discharge Disposition   Home with self-care  Condition at discharge: Stable      Hospital Course   62 years old male with reasonably good health and was admitted to the hospital with new onset atrial fibrillation that he was found to have RVR reportedly converted spontaneously, but patient was started on heparin drip.  Patient has a history of PCI and, diet-controlled diabetes type 2, obstructive sleep apnea compliant with CPAP and alcohol use unspecified.  He was wondering if echocardiogram cannot be done today, if he can do it as outpatient.  Patient denies any headache, visual disturbance, dizziness, nausea, vomiting, chest pain, heart palpitation, shortness of breath, fever, chills or fatigue.  Patient tolerated the heparin drip well.  According to cardiologist team, patient is safe to start on apixaban 5 mg twice a day for at least 4-weeks.  Patient currently is stable and  recommend for patient to follow-up with his primary doctor 1 to 2-week after discharge and cardiology as needed.  Encouraged him to take his medication as prescribed.  This morning seen patient in bed and he appears to be in no acute distress.    Consultations This Hospital Stay   CARDIOLOGY IP CONSULT  PHARMACY LIAISON FOR MEDICATION COVERAGE CONSULT  PHARMACY IP CONSULT  PHARMACY IP CONSULT    Code Status   Full Code    Time Spent on this Encounter   I, JEFFREY Suggs CNP, personally saw the patient today and spent greater than 30 minutes discharging this patient.  Discussed about discharge planning with patient, family member and nursing staff.       JEFFREY Suggs CNP  [facility] EXTENDED RECOVERY AND SHORT STAY  [address] [address]  Phone: [phone]  ______________________________________________________________________    Physical Exam   Vital Signs: Temp: 98.3  F (36.8  C) Temp src: Oral BP: 116/76 Pulse: 69   Resp: 16 SpO2: 99 % O2 Device: None (Room air)    Weight: 195 lbs 0 oz  Constitutional: awake, alert, cooperative, no apparent distress, and appears stated age  Eyes: Lids and lashes normal, pupils equal, round and reactive to light, extra ocular muscles intact, sclera clear, conjunctiva normal  ENT: Negative.  Respiratory: Clear to auscultation bilaterally.  No real or wheezing appreciated.  Respirations regular.  Cardiovascular: Normal apical impulse, regular rate and rhythm, normal S1 and S2, no S3 or S4, and no murmur noted  GI: Bowel sounds positive nontender nondistended.  Skin: no bruising or bleeding, normal skin color, texture, turgor and no rashes  Musculoskeletal: no lower extremity pitting edema present  there is no redness, warmth, or swelling of the joints  Neurologic: Alert and oriented x4.  No focal deficit present.       Primary Care Physician   Tha Ortega    Discharge Orders      Follow-Up with Cardiology MAGUI       Follow-Up with Cardiology MAGUI      Follow-Up with Cardiology      EKG 12-lead complete w/read  (to be scheduled)     Echocardiogram Complete    Administration of IV contrast will be tailored to this examination per the appropriate written protocol listed in the Echocardiography department Protocol Book, or by the supervising Cardiologist. This may result in an order change.    Use of contrast is at the discretion of the supervising Cardiologist.       Significant Results and Procedures   Most Recent 3 CBC's:Recent Labs   Lab Test 02/11/23  0713 02/10/23  1535 02/10/23  0813   WBC 13.1* 13.9* 12.6*   HGB 14.7 14.1 14.8   MCV 96 92 96    178 200     Most Recent 3 BMP's:Recent Labs   Lab Test 02/11/23  0713 02/10/23  0813 08/08/22  0909 04/22/22  0757    138  --  138   POTASSIUM 4.4 4.4  --  4.2   CHLORIDE 102 105  --  108   CO2 27 23  --  26   BUN 16.6 19.6  --  15   CR 1.21* 1.04  --  1.21   ANIONGAP 9 10  --  4   FABRICIO 9.3 9.4  --  8.7   * 142* 114* 127*     Most Recent 3 INR's:No lab results found.  Most Recent 3 Troponin's:No lab results found.    Discharge Medications   Current Discharge Medication List      START taking these medications    Details   apixaban ANTICOAGULANT (ELIQUIS) 5 MG tablet Take 1 tablet (5 mg) by mouth 2 times daily for 30 days  Qty: 60 tablet, Refills: 0    Comments: Follow-up with your primary doctor, after discharge for refill and medication adjustment.  Associated Diagnoses: New onset atrial fibrillation (H)      metoprolol tartrate (LOPRESSOR) 25 MG tablet Take 1 tablet (25 mg) by mouth 2 times daily for 30 days  Qty: 60 tablet, Refills: 0    Comments: Follow-up with your primary doctor, after discharge for medication adjustment and refill.  Associated Diagnoses: New onset atrial fibrillation (H)      pantoprazole (PROTONIX) 40 MG EC tablet Take 1 tablet (40 mg) by mouth every morning (before breakfast) for 30 days  Qty: 30 tablet, Refills: 0    Comments: Follow-up  with your primary doctor, after discharge for medication adjustment and refill.  Associated Diagnoses: New onset atrial fibrillation (H)         CONTINUE these medications which have NOT CHANGED    Details   aspirin 81 MG EC tablet Take 81 mg by mouth daily      buPROPion (WELLBUTRIN XL) 150 MG 24 hr tablet Take 1 tablet (150 mg) by mouth every morning  Qty: 90 tablet, Refills: 3    Associated Diagnoses: Major depressive disorder, recurrent episode, mild (H)      cetirizine (ZYRTEC) 10 MG tablet Take 10 mg by mouth daily as needed (For itching)      citalopram (CELEXA) 20 MG tablet Take 1 tablet (20 mg) by mouth daily  Qty: 90 tablet, Refills: 3    Associated Diagnoses: Major depressive disorder, recurrent episode, mild (H)      doxycycline hyclate (VIBRAMYCIN) 50 MG capsule Take 50 mg by mouth daily      Dupilumab (DUPIXENT) 300 MG/2ML syringe Inject 300 mg Subcutaneous every 14 days      ezetimibe (ZETIA) 10 MG tablet Take 1 tablet (10 mg) by mouth daily Appointment required for further refills  Qty: 90 tablet, Refills: 3    Associated Diagnoses: Coronary artery disease involving native coronary artery of native heart without angina pectoris; Hyperlipidemia LDL goal <70      famotidine (PEPCID) 20 MG tablet Take 20 mg by mouth daily as needed      fluticasone (FLONASE) 50 MCG/ACT spray Spray 2 sprays into both nostrils daily  Qty: 1 Bottle, Refills: 11    Associated Diagnoses: Post-nasal drainage      rosuvastatin (CRESTOR) 20 MG tablet Take 1 tablet (20 mg) by mouth daily  Qty: 90 tablet, Refills: 3    Associated Diagnoses: Hypercholesterolemia         STOP taking these medications       ibuprofen (ADVIL/MOTRIN) 200 MG tablet Comments:   Reason for Stopping:             Allergies   Allergies   Allergen Reactions     Atorvastatin      Muscle aches     Questran [Cholestyramine]      Nausea

## 2023-02-11 NOTE — PROVIDER NOTIFICATION
MD Notification    Notified Person: CNP    Notified Person Name: CNP Mesha    Notification Date/Time: 2/11/2023  1324 PM    Notification Interaction: text paged    Purpose of Notification: 2nd attempt: pt asking for discharge orders, cards signed off.     Orders Received: Pending    Comments:

## 2023-02-11 NOTE — PLAN OF CARE
PRIMARY DIAGNOSIS: CHEST PAIN  OUTPATIENT/OBSERVATION GOALS TO BE MET BEFORE DISCHARGE:  1. Ruled out acute coronary syndrome (negative or stable Troponin):  Yes  2. Pain Status: Pain free.  3. Appropriate provocative testing performed: No  - Stress Test Procedure: Echo  - Interpretation of cardiac rhythm per telemetry tech: Afib CVR- RVR, mostly CVR    4. Cleared by Consultants (if applicable):No  5. Return to near baseline physical activity: Yes  Discharge Planner Nurse   Safe discharge environment identified: Yes  Barriers to discharge: No       Entered by: Jason Nicholas RN 02/10/2023 6:26 PM     Please review provider order for any additional goals.   Nurse to notify provider when observation goals have been met and patient is ready for discharge.  Goal Outcome Evaluation:  Orientation/Cognitive: A&OX4  Observation Goals (Met/ Not Met):   Mobility Level/Assist Equipment: Up ad no  Fall Risk (Y/N): No  Behavior Concerns: None, pleasant  Pain Management: Denies pain  Tele/VS/O2:  VSS on RA, tele was afib CVR, occasionally tachy  ABNL Lab/BG:  Trop elevated but trending down now, WBC up at 13.9  Diet: Cardiac diet  Bowel/Bladder: Continent  Skin Concerns: None  Drains/Devices: PIV  Tests/Procedures for next shift: Hep gtt at 1050 units/hr, recheck at 2215 PM tonight, Echo pending  Anticipated DC date & active delays: Possibly tomorrow if HR continues to be controlled, cardiology following  Patient Stated Goal for Today: To go home

## 2023-02-11 NOTE — PLAN OF CARE
Observation Goals:    -diagnostic tests and consults completed and resulted: Not met  -vital signs normal or at patient baseline: Not met  -tolerating oral intake to maintain hydration: Met  -safe disposition plan has been identified: Met  Nurse to notify provider when observation goals have been met and patient is ready for discharge.

## 2023-02-11 NOTE — PROGRESS NOTES
PRIMARY DIAGNOSIS: CHEST PAIN  OUTPATIENT/OBSERVATION GOALS TO BE MET BEFORE DISCHARGE:  1. Ruled out acute coronary syndrome (negative or stable Troponin):  Yes  2. Pain Status: Pain free.  3. Appropriate provocative testing performed: N/A  - Stress Test Procedure: Echo  - Interpretation of cardiac rhythm per telemetry tech: SB    4. Cleared by Consultants (if applicable):Yes  5. Return to near baseline physical activity: Yes  Discharge Planner Nurse   Safe discharge environment identified: Yes  Barriers to discharge: No       Entered by: Jason Nicholas RN 02/11/2023 12:55 PM     Please review provider order for any additional goals.   Nurse to notify provider when observation goals have been met and patient is ready for discharge.

## 2023-02-11 NOTE — PROVIDER NOTIFICATION
MD Notification    Notified Person: MD    Notified Person Name: Jas baumearl MD    Notification Date/Time: 2/11/2023  11:43 AM    Notification Interaction: text paged    Purpose of Notification: Pt asking to be discharged today with outpt Echo. Echo might not be done today 2/2 staffing, needs anticoagulant recommendation as well. Please advise.    Orders Received:    Comments:

## 2023-02-11 NOTE — PROGRESS NOTES
[facility] Cardiology Progress Note      Subjective   62M presented with new onset AF w/ RVR in the context of CAD with PCI to the LAD in 2010 and EtOH use, T2DM, KIMBERLY on CPAP    He was commenced on metoprolol and heparin, self converted overnight, now feels quite well without any major concerns. He is interested in leaving the hospital today    Objective     VITAL SIGNS  Temp:  [97.6  F (36.4  C)-98.3  F (36.8  C)] 98.3  F (36.8  C)  Pulse:  [] 69  Resp:  [10-24] 16  BP: (103-128)/(67-88) 116/76  SpO2:  [91 %-99 %] 99 %  Admission Weight: 88.5 kg (195 lb)  Current Weight: 88.5 kg (195 lb)    PHYSICAL EXAMINATION  General:  Well-developed, well-nourished. No acute distress. Alert and oriented x 3.  Pleasant and cooperative.  HEENT:  Extraocular movements grossly intact. Sclera Anicteric. Oropharynx covered with a mask  Cardiovascular:  Regular rate and rhythm. Normal S1 & S2. No murmurs  Lungs:  Normal work of breathing. Clear to auscultation bilaterally.  Abdomen:  Soft, nontender, nondistended.  Positive bowel sounds.  Extremities:  Warm, well perfused. 2+ radial pulses bilaterally.  No significant edema.   Neuro: Moving all extremities, no gross deficits noted    DIAGNOSTICS    Most Recent 3 CBC's:  Recent Labs   Lab Test 02/11/23  0713 02/10/23  1535 02/10/23  0813   WBC 13.1* 13.9* 12.6*   HGB 14.7 14.1 14.8   MCV 96 92 96    178 200     Most Recent 3 BMP's:  Recent Labs   Lab Test 02/11/23  0713 02/10/23  0813 08/08/22  0909 04/22/22  0757    138  --  138   POTASSIUM 4.4 4.4  --  4.2   CHLORIDE 102 105  --  108   CO2 27 23  --  26   BUN 16.6 19.6  --  15   CR 1.21* 1.04  --  1.21   ANIONGAP 9 10  --  4   FABRICIO 9.3 9.4  --  8.7   * 142* 114* 127*     Most Recent 3 Troponin's:No lab results found.  Most Recent 3 BNP's:No lab results found.  Most Recent Cholesterol Panel:  Recent Labs   Lab Test 04/22/22  0757   CHOL 150   LDL 78   HDL 46    TRIG 128         Assessment & Plan     Mr. Duffy is a pleasant 62 year old male who presented with new onset AF with RVR in the context of previous remote PCI, EtOH use, T2DM, KIMBERLY on CPAP    He self converted overnight and is doing quite well. Scheduled for a TTE but with staffing limitations this will likely not get done today, he is quite interested in leaving the hospital, which I think is reasonable. We will transition to Apixaban for PO AC for at least 4 weeks, continue Metoprolol and set up an outpatient TTE, ECG, and f/u visit. Long term AC can be discussed as an outpatient    # New onset atrial fibrillation with RVR, self converted, CHADS-VASc: 1 (will soon be 63)  # CAD, s/p previous PCI  # T2DM  # KIMBERLY on treatment  # EtOH Use    Plan:  - Ok to discharge  - Continue Metoprolol  - Transition Heparin to Apixaban BID  - Outpatient TTE and f/u     Medical Decision Making       35 MINUTES SPENT BY ME on the date of service doing chart review, history, exam, documentation & further activities per the note.       Kwadwo Jimenez MD  2/11/2023

## 2023-02-11 NOTE — PLAN OF CARE
Goal Outcome Evaluation:  A&OX4, VSS on RA. Denies chest pain, n&V. Up ind. Tele has been SR. All discharge instructions and meds reviewed with pt and family, both verbalized understanding. Stable at time of discharge. All belongings returned to pt.

## 2023-02-11 NOTE — PLAN OF CARE
Shift: 7p-7:30a  Orientation/Cognitive: A&OX4  Observation Goals (Met/ Not Met): Not met  Mobility Level/Assist Equipment: Independent  Fall Risk (Y/N): No  Behavior Concerns: None  Pain Management: Denies pain  Tele/VS/O2:  VSS on RA, tele was afib CVR converted to NSB overnight  ABNL Lab/BG:  No new lab results on writers shift  Diet: Cardiac  Bowel/Bladder: Continent  Skin Concerns: None  Drains/Devices: IV: Heparin infusing at 750units/hr. Next Hep draw 0640am   Tests/Procedures for next shift: Labs, Echo bubble study   Anticipated DC date & active delays: 2/11, Pending Echo and Heart rhythm  Patient Stated Goal for Today: To go home

## 2023-02-14 ENCOUNTER — PATIENT OUTREACH (OUTPATIENT)
Dept: INTERNAL MEDICINE | Facility: CLINIC | Age: 63
End: 2023-02-14
Payer: COMMERCIAL

## 2023-02-14 NOTE — TELEPHONE ENCOUNTER
ED / Discharge Outreach Protocol    Patient Contact    Attempt # 1    Was call answered?  No.  Left message on voicemail with information to call me back.    Kyle Saldaña RN

## 2023-02-14 NOTE — TELEPHONE ENCOUNTER
What type of discharge? Observation  Risk of Hospital admission or ED visit: 25%  Is a TCM episode required? No  When should the patient follow up with PCP? 14 days of discharge.    Carmita Whiteside RN  [facility]

## 2023-02-15 NOTE — TELEPHONE ENCOUNTER
Attempted to contact pt to perform hospital follow up questions. No answer. Left VM.     Upon callback- please perform hospital follow up questions.     Since this attempt #2- will route to PCP if further action is needed.     Patient Contact    Attempt # 2    Was call answered?  No.  Left message on voicemail with information to call me back.

## 2023-02-15 NOTE — TELEPHONE ENCOUNTER
"  ED for acute condition Discharge Protocol    \"Hi, my name is Devang Zamarripa RN, a registered nurse, and I am calling from [facility].  I am calling to follow up and see how things are going for you after your recent emergency visit.\"    Tell me how you are doing now that you are home?\" doing well a little tired but feeling better      Discharge Instructions    \"Let's review your discharge instructions.  What is/are the follow-up recommendations?  Pt. Response: see PCP and cardiology    \"Has an appointment with your primary care provider been scheduled?\"  No (needed - schedule appointment and remind to bring meds)    Medications    \"Tell me what changed about your medicines when you discharged?\"    Started on eliqist, metoprolol and protonix     \"What questions do you have about your medications?\"   None        Call Summary    \"What questions or concerns do you have about your recent visit and your follow-up care?\"     none    \"If you have questions or things don't continue to improve, we encourage you contact us through the main clinic number (give number).  Even if the clinic is not open, triage nurses are available 24/7 to help you.     We would like you to know that our clinic has extended hours (provide information).  We also have urgent care (provide details on closest location and hours/contact info)\"    \"Thank you for your time and take care!\"                "

## 2023-02-24 ENCOUNTER — OFFICE VISIT (OUTPATIENT)
Dept: CARDIOLOGY | Facility: CLINIC | Age: 63
End: 2023-02-24
Payer: COMMERCIAL

## 2023-02-24 VITALS
WEIGHT: 198 LBS | SYSTOLIC BLOOD PRESSURE: 120 MMHG | HEART RATE: 55 BPM | HEIGHT: 68 IN | BODY MASS INDEX: 30.01 KG/M2 | DIASTOLIC BLOOD PRESSURE: 74 MMHG | OXYGEN SATURATION: 97 %

## 2023-02-24 DIAGNOSIS — F33.40 RECURRENT MAJOR DEPRESSIVE DISORDER, IN REMISSION (H): ICD-10-CM

## 2023-02-24 DIAGNOSIS — I25.10 CORONARY ARTERY DISEASE INVOLVING NATIVE CORONARY ARTERY OF NATIVE HEART WITHOUT ANGINA PECTORIS: ICD-10-CM

## 2023-02-24 DIAGNOSIS — E78.5 HYPERLIPIDEMIA LDL GOAL <70: ICD-10-CM

## 2023-02-24 DIAGNOSIS — E11.9 TYPE 2 DIABETES MELLITUS WITHOUT COMPLICATION, WITHOUT LONG-TERM CURRENT USE OF INSULIN (H): ICD-10-CM

## 2023-02-24 DIAGNOSIS — I48.91 NEW ONSET ATRIAL FIBRILLATION (H): Primary | ICD-10-CM

## 2023-02-24 PROCEDURE — 93000 ELECTROCARDIOGRAM COMPLETE: CPT | Performed by: NURSE PRACTITIONER

## 2023-02-24 PROCEDURE — 99215 OFFICE O/P EST HI 40 MIN: CPT | Performed by: NURSE PRACTITIONER

## 2023-02-24 RX ORDER — METOPROLOL TARTRATE 25 MG/1
25 TABLET, FILM COATED ORAL 2 TIMES DAILY
Qty: 60 TABLET | Refills: 1 | Status: SHIPPED | OUTPATIENT
Start: 2023-02-24 | End: 2023-04-20

## 2023-02-24 NOTE — LETTER
2/24/2023    Tha Ortega MD  [address]  [facility] [address]    RE: Hermelindo Duffy       Dear Colleague,     I had the pleasure of seeing Hermelindo Duffy in the [facility].              ~Cardiology Clinic Visit~    Primary Cardiologist: Dr. Carranza  Last Office Visit: 4/22/2022  Reason for visit: hospital follow for atrial fibrillation    History of Present Illness     Hermelindo Duffy is a very pleasant 62 year old male with a past medical history notable for CAD, s/p JIA to LAD 2010, HTN, DLD, hx of statin intolerance.  - Recent Hospitalization: 2/10/2023 - 2/11/2023 atrial fibrillation with RVR.    He has been seen in cardiology clinic for evaluation of coronary artery disease and dyslipidemia.  In 2010, he underwent stenting of the proximal LAD after for developing chest discomfort symptoms.  Fortunately, since then, he has not had any recurrent coronary artery disease complications or problems.      He did, however, developed some statin intolerance primarily with atorvastatin, mostly muscle pains.  He is able to tolerate a moderate dose of rosuvastatin 20 mg daily, as well as Zetia 10 mg daily for additional LDL control.    Over the years, he has maintained an active work life as a , but has not been doing much regular exercise.  His weight is stable and he has been feeling well without recurrent chest pains or shortness of breath symptoms.    Most recently, patient was admitted to [facility] on 2/10/2023 with new A-fib and found to be in RVR.  He did spontaneously convert.  Echocardiogram 02/11/2023 showed normal EF 55 to 60%, with normal LV systolic and diastolic function.    EKG performed in clinic was personally reviewed by me, and demonstrates sinus bradycardia.    Interval 02/24/23    Today, he is doing well.  He mentions that he has had absolutely no recurrence of atrial fibrillation.  He does have an Apple Watch and has sporadically been able to  check his heart rate there.  He is remained compliance with his current medications.  Denies bleeding concerns, lightheadedness or dizziness, palpitations, feeling faint.    The pathophysiology of atrial fibrillation (various causes, possibility of isolated atrial fibrillation, risk of thrombus and embolic stroke, need for anticoagulation depending on etiology and Chads-Vasc Score) was discussed with patient and/or support person(s). Rate vs rhythm control approach was discussed.  In some patients, afib ablation may be an option and was reviewed at this encounter.    Impression    Atrial fibrillation, new, single episode  -On apixaban, metoprolol.  -[id number] score 2  -Echocardiogram 2/11/23 showed normal left atrial size, right atrial size is normal.  -No recurrence.     Coronary artery disease  History of LAD stenting, 2010  -On aspirin, ezetimibe, Lopressor, rosuvastatin.  -Asymptomatic.    Hyperlipidemia with LDL goal < 70  -Recent LDL 78, on rosuvastatin and Zetia.  -Intolerant to higher doses of statins due to muscle aches.    Diabetes type 2, diet controlled.  KIMBERLY, on CPAP  Alcohol use, 2-3 glasses of wine daily.  __________________________________________________________________    Plan:  1. Continue current plan of care, no changes made today.  2. Educated patient on rationale for each medication and maintaining compliance for hospital discharge.  3. Educated patient on lifestyle approaches to reduce A-fib recurrence.  4. We will follow-up in approximately 4 to 6 weeks to reassess.  __________________________________________________________________    Thank you for the opportunity to participate in this pleasant patient's care.    We would be happy to see this patient sooner for any concerns in the meantime.    JEFFREY Garcia, CNP   Nurse Practitioner  [facility]    Today's clinic visit entailed:  Review of the result(s) of each unique test - ekg, hospital summary, echo,  labs  The following tests were independently interpreted by me as noted in my documentation: ekg  Prescription drug management  The level of medical decision making during this visit was of high complexity.    Orders this Visit:  Orders Placed This Encounter   Procedures     Follow-Up with Cardiology MAG[facility]     EKG 12-lead complete w/read - Clinics (performed today)     Orders Placed This Encounter   Medications     apixaban ANTICOAGULANT (ELIQUIS) 5 MG tablet     Sig: Take 1 tablet (5 mg) by mouth 2 times daily     Dispense:  180 tablet     Refill:  0     metoprolol tartrate (LOPRESSOR) 25 MG tablet     Sig: Take 1 tablet (25 mg) by mouth 2 times daily     Dispense:  60 tablet     Refill:  1     Medications Discontinued During This Encounter   Medication Reason     apixaban ANTICOAGULANT (ELIQUIS) 5 MG tablet Reorder (No AVS / No eCancel)     metoprolol tartrate (LOPRESSOR) 25 MG tablet Reorder (No AVS / No eCancel)     Encounter Diagnosis   Name Primary?     New onset atrial fibrillation (H)      CURRENT MEDICATIONS:  Current Outpatient Medications   Medication Sig Dispense Refill     apixaban ANTICOAGULANT (ELIQUIS) 5 MG tablet Take 1 tablet (5 mg) by mouth 2 times daily 180 tablet 0     aspirin 81 MG EC tablet Take 81 mg by mouth daily       buPROPion (WELLBUTRIN XL) 150 MG 24 hr tablet Take 1 tablet (150 mg) by mouth every morning 90 tablet 3     cetirizine (ZYRTEC) 10 MG tablet Take 10 mg by mouth daily as needed (For itching)       citalopram (CELEXA) 20 MG tablet Take 1 tablet (20 mg) by mouth daily 90 tablet 3     doxycycline hyclate (VIBRAMYCIN) 50 MG capsule Take 50 mg by mouth daily       Dupilumab (DUPIXENT) 300 MG/2ML syringe Inject 300 mg Subcutaneous every 14 days       ezetimibe (ZETIA) 10 MG tablet Take 1 tablet (10 mg) by mouth daily Appointment required for further refills 90 tablet 3     famotidine (PEPCID) 20 MG tablet Take 20 mg by mouth daily as needed       fluticasone (FLONASE) 50 MCG/ACT  "spray Spray 2 sprays into both nostrils daily 1 Bottle 11     metoprolol tartrate (LOPRESSOR) 25 MG tablet Take 1 tablet (25 mg) by mouth 2 times daily 60 tablet 1     pantoprazole (PROTONIX) 40 MG EC tablet Take 1 tablet (40 mg) by mouth every morning (before breakfast) for 30 days 30 tablet 0     rosuvastatin (CRESTOR) 20 MG tablet Take 1 tablet (20 mg) by mouth daily 90 tablet 3     ALLERGIES     Allergies   Allergen Reactions     Atorvastatin      Muscle aches     Questran [Cholestyramine]      Nausea     PAST MEDICAL, SURGICAL, FAMILY, SOCIAL HISTORY:  History was reviewed and updated as needed, see medical record.    Review of Systems:  Review Of Systems  Skin: negative  Eyes: negative  Ears/Nose/Throat: negative  Respiratory: No shortness of breath, dyspnea on exertion, cough, or hemoptysis  Cardiovascular: negative  Gastrointestinal: negative, melena and hematochezia  Genitourinary: negative and hematuria  Musculoskeletal: negative  Neurologic: negative  Psychiatric: positive for excessive stress and depression stable  Hematologic/Lymphatic/Immunologic: negative and bleeding disorder  Endocrine: positive for diabetes     Physical Exam:    Vitals: /74   Pulse 55   Ht 1.727 m (5' 8\")   Wt 89.8 kg (198 lb)   SpO2 97%   BMI 30.11 kg/m    Constitutional: Appears stated age, well nourished, NAD.  Eyes: Pupils equal, round. Sclerae anicteric.   HEENT: Normocephalic, atraumatic.   Neck: Supple. Carotid pulses full and equal.   Respiratory: Non-labored. Lungs CTAB.  Cardiovascular: RRR, normal S1 and S2. No M/G/R.  No edema.  GI: Soft, non-distended, non-tender.  Skin: Warm and dry. No rashes, cyanosis, edema.  Musculoskeletal/Extremities: Symmetrical movement to all extremities.  Neurologic: No gross focal deficits. Alert, awake, and oriented to all spheres.  Psychiatric: Affect appropriate. Mentation normal.    Recent Lab Results:  LIPID RESULTS:  Lab Results   Component Value Date    CHOL 150 04/22/2022 "    CHOL 147 01/29/2021    HDL 46 04/22/2022    HDL 44 01/29/2021    LDL 78 04/22/2022    LDL 62 01/29/2021    TRIG 128 04/22/2022    TRIG 207 (H) 01/29/2021    CHOLHDLRATIO 4.9 (H) 05/20/2015     LIVER ENZYME RESULTS:  Lab Results   Component Value Date    AST 28 08/09/2021    AST 36 07/03/2014    ALT 57 04/22/2022    ALT 60 01/29/2021     CBC RESULTS:  Lab Results   Component Value Date    WBC 13.1 (H) 02/11/2023    WBC 6.8 08/07/2015    RBC 4.63 02/11/2023    RBC 4.67 08/07/2015    HGB 14.7 02/11/2023    HGB 15.1 08/07/2015    HCT 44.5 02/11/2023    HCT 43.6 08/07/2015    MCV 96 02/11/2023    MCV 93 08/07/2015    MCH 31.7 02/11/2023    MCH 32.3 08/07/2015    MCHC 33.0 02/11/2023    MCHC 34.6 08/07/2015    RDW 12.8 02/11/2023    RDW 12.6 08/07/2015     02/11/2023     08/07/2015     BMP RESULTS:  Lab Results   Component Value Date     02/11/2023     01/29/2021    POTASSIUM 4.4 02/11/2023    POTASSIUM 4.2 04/22/2022    POTASSIUM 4.1 01/29/2021    CHLORIDE 102 02/11/2023    CHLORIDE 108 04/22/2022    CHLORIDE 110 (H) 01/29/2021    CO2 27 02/11/2023    CO2 26 04/22/2022    CO2 25 01/29/2021    ANIONGAP 9 02/11/2023    ANIONGAP 4 04/22/2022    ANIONGAP 6 01/29/2021     (H) 02/11/2023     (H) 08/08/2022     (H) 01/29/2021    BUN 16.6 02/11/2023    BUN 15 04/22/2022    BUN 14 01/29/2021    CR 1.21 (H) 02/11/2023    CR 1.03 01/29/2021    GFRESTIMATED 67 02/11/2023    GFRESTIMATED 77 01/29/2021    GFRESTBLACK 90 01/29/2021    FABRICIO 9.3 02/11/2023    FABRICIO 8.5 01/29/2021      A1C RESULTS:  Lab Results   Component Value Date    A1C 5.9 (H) 02/11/2023     INR RESULTS:  Lab Results   Component Value Date    INR 0.91 08/15/2010             Thank you for allowing me to participate in the care of your patient.      Sincerely,     JEFFREY Garcia [facility]  cc:   Melany Daniel PA-C  [address]  [place]  [address]  [place] [id number]

## 2023-02-24 NOTE — PROGRESS NOTES
~Cardiology Clinic Visit~    Primary Cardiologist: Dr. Carranza  Last Office Visit: 4/22/2022  Reason for visit: hospital follow for atrial fibrillation    History of Present Illness    jesus Duffy is a very pleasant 62 year old male with a past medical history notable for CAD, s/p JIA to LAD 2010, HTN, DLD, hx of statin intolerance.  - Recent Hospitalization: 2/10/2023 - 2/11/2023 atrial fibrillation with RVR.    He has been seen in cardiology clinic for evaluation of coronary artery disease and dyslipidemia.  In 2010, he underwent stenting of the proximal LAD after for developing chest discomfort symptoms.  Fortunately, since then, he has not had any recurrent coronary artery disease complications or problems.      He did, however, developed some statin intolerance primarily with atorvastatin, mostly muscle pains.  He is able to tolerate a moderate dose of rosuvastatin 20 mg daily, as well as Zetia 10 mg daily for additional LDL control.    Over the years, he has maintained an active work life as a , but has not been doing much regular exercise.  His weight is stable and he has been feeling well without recurrent chest pains or shortness of breath symptoms.    Most recently, patient was admitted to [facility] on 2/10/2023 with new A-fib and found to be in RVR.  He did spontaneously convert.  Echocardiogram 02/11/2023 showed normal EF 55 to 60%, with normal LV systolic and diastolic function.    EKG performed in clinic was personally reviewed by me, and demonstrates sinus bradycardia.    Interval 02/24/23    Today, he is doing well.  He mentions that he has had absolutely no recurrence of atrial fibrillation.  He does have an Apple Watch and has sporadically been able to check his heart rate there.  He is remained compliance with his current medications.  Denies bleeding concerns, lightheadedness or dizziness, palpitations, feeling faint.    The pathophysiology of atrial fibrillation (various  causes, possibility of isolated atrial fibrillation, risk of thrombus and embolic stroke, need for anticoagulation depending on etiology and Chads-Vasc Score) was discussed with patient and/or support person(s). Rate vs rhythm control approach was discussed.  In some patients, afib ablation may be an option and was reviewed at this encounter.    Impression    Atrial fibrillation, new, single episode  -On apixaban, metoprolol.  -[id number] score 2  -Echocardiogram 2/11/23 showed normal left atrial size, right atrial size is normal.  -No recurrence.     Coronary artery disease  History of LAD stenting, 2010  -On aspirin, ezetimibe, Lopressor, rosuvastatin.  -Asymptomatic.    Hyperlipidemia with LDL goal < 70  -Recent LDL 78, on rosuvastatin and Zetia.  -Intolerant to higher doses of statins due to muscle aches.    Diabetes type 2, diet controlled.  KIMBERLY, on CPAP  Alcohol use, 2-3 glasses of wine daily.  __________________________________________________________________    Plan:  1. Continue current plan of care, no changes made today.  2. Educated patient on rationale for each medication and maintaining compliance for hospital discharge.  3. Educated patient on lifestyle approaches to reduce A-fib recurrence.  4. We will follow-up in approximately 4 to 6 weeks to reassess.  __________________________________________________________________    Thank you for the opportunity to participate in this pleasant patient's care.    We would be happy to see this patient sooner for any concerns in the meantime.    JEFFREY Garcia, CNP   Nurse Practitioner  [facility]    Today's clinic visit entailed:  Review of the result(s) of each unique test - ekg, hospital summary, echo, labs  The following tests were independently interpreted by me as noted in my documentation: ekg  Prescription drug management  The level of medical decision making during this visit was of high complexity.    Orders this  Visit:  Orders Placed This Encounter   Procedures     Follow-Up with Cardiology [facility]     EKG 12-lead complete w/read - Clinics (performed today)     Orders Placed This Encounter   Medications     apixaban ANTICOAGULANT (ELIQUIS) 5 MG tablet     Sig: Take 1 tablet (5 mg) by mouth 2 times daily     Dispense:  180 tablet     Refill:  0     metoprolol tartrate (LOPRESSOR) 25 MG tablet     Sig: Take 1 tablet (25 mg) by mouth 2 times daily     Dispense:  60 tablet     Refill:  1     Medications Discontinued During This Encounter   Medication Reason     apixaban ANTICOAGULANT (ELIQUIS) 5 MG tablet Reorder (No AVS / No eCancel)     metoprolol tartrate (LOPRESSOR) 25 MG tablet Reorder (No AVS / No eCancel)     Encounter Diagnosis   Name Primary?     New onset atrial fibrillation (H)      CURRENT MEDICATIONS:  Current Outpatient Medications   Medication Sig Dispense Refill     apixaban ANTICOAGULANT (ELIQUIS) 5 MG tablet Take 1 tablet (5 mg) by mouth 2 times daily 180 tablet 0     aspirin 81 MG EC tablet Take 81 mg by mouth daily       buPROPion (WELLBUTRIN XL) 150 MG 24 hr tablet Take 1 tablet (150 mg) by mouth every morning 90 tablet 3     cetirizine (ZYRTEC) 10 MG tablet Take 10 mg by mouth daily as needed (For itching)       citalopram (CELEXA) 20 MG tablet Take 1 tablet (20 mg) by mouth daily 90 tablet 3     doxycycline hyclate (VIBRAMYCIN) 50 MG capsule Take 50 mg by mouth daily       Dupilumab (DUPIXENT) 300 MG/2ML syringe Inject 300 mg Subcutaneous every 14 days       ezetimibe (ZETIA) 10 MG tablet Take 1 tablet (10 mg) by mouth daily Appointment required for further refills 90 tablet 3     famotidine (PEPCID) 20 MG tablet Take 20 mg by mouth daily as needed       fluticasone (FLONASE) 50 MCG/ACT spray Spray 2 sprays into both nostrils daily 1 Bottle 11     metoprolol tartrate (LOPRESSOR) 25 MG tablet Take 1 tablet (25 mg) by mouth 2 times daily 60 tablet 1     pantoprazole (PROTONIX) 40 MG EC tablet Take 1 tablet (40  "mg) by mouth every morning (before breakfast) for 30 days 30 tablet 0     rosuvastatin (CRESTOR) 20 MG tablet Take 1 tablet (20 mg) by mouth daily 90 tablet 3     ALLERGIES     Allergies   Allergen Reactions     Atorvastatin      Muscle aches     Questran [Cholestyramine]      Nausea     PAST MEDICAL, SURGICAL, FAMILY, SOCIAL HISTORY:  History was reviewed and updated as needed, see medical record.    Review of Systems:  Review Of Systems  Skin: negative  Eyes: negative  Ears/Nose/Throat: negative  Respiratory: No shortness of breath, dyspnea on exertion, cough, or hemoptysis  Cardiovascular: negative  Gastrointestinal: negative, melena and hematochezia  Genitourinary: negative and hematuria  Musculoskeletal: negative  Neurologic: negative  Psychiatric: positive for excessive stress and depression stable  Hematologic/Lymphatic/Immunologic: negative and bleeding disorder  Endocrine: positive for diabetes     Physical Exam:    Vitals: /74   Pulse 55   Ht 1.727 m (5' 8\")   Wt 89.8 kg (198 lb)   SpO2 97%   BMI 30.11 kg/m    Constitutional: Appears stated age, well nourished, NAD.  Eyes: Pupils equal, round. Sclerae anicteric.   HEENT: Normocephalic, atraumatic.   Neck: Supple. Carotid pulses full and equal.   Respiratory: Non-labored. Lungs CTAB.  Cardiovascular: RRR, normal S1 and S2. No M/G/R.  No edema.  GI: Soft, non-distended, non-tender.  Skin: Warm and dry. No rashes, cyanosis, edema.  Musculoskeletal/Extremities: Symmetrical movement to all extremities.  Neurologic: No gross focal deficits. Alert, awake, and oriented to all spheres.  Psychiatric: Affect appropriate. Mentation normal.    Recent Lab Results:  LIPID RESULTS:  Lab Results   Component Value Date    CHOL 150 04/22/2022    CHOL 147 01/29/2021    HDL 46 04/22/2022    HDL 44 01/29/2021    LDL 78 04/22/2022    LDL 62 01/29/2021    TRIG 128 04/22/2022    TRIG 207 (H) 01/29/2021    CHOLHDLRATIO 4.9 (H) 05/20/2015     LIVER ENZYME RESULTS:  Lab " Results   Component Value Date    AST 28 08/09/2021    AST 36 07/03/2014    ALT 57 04/22/2022    ALT 60 01/29/2021     CBC RESULTS:  Lab Results   Component Value Date    WBC 13.1 (H) 02/11/2023    WBC 6.8 08/07/2015    RBC 4.63 02/11/2023    RBC 4.67 08/07/2015    HGB 14.7 02/11/2023    HGB 15.1 08/07/2015    HCT 44.5 02/11/2023    HCT 43.6 08/07/2015    MCV 96 02/11/2023    MCV 93 08/07/2015    MCH 31.7 02/11/2023    MCH 32.3 08/07/2015    MCHC 33.0 02/11/2023    MCHC 34.6 08/07/2015    RDW 12.8 02/11/2023    RDW 12.6 08/07/2015     02/11/2023     08/07/2015     BMP RESULTS:  Lab Results   Component Value Date     02/11/2023     01/29/2021    POTASSIUM 4.4 02/11/2023    POTASSIUM 4.2 04/22/2022    POTASSIUM 4.1 01/29/2021    CHLORIDE 102 02/11/2023    CHLORIDE 108 04/22/2022    CHLORIDE 110 (H) 01/29/2021    CO2 27 02/11/2023    CO2 26 04/22/2022    CO2 25 01/29/2021    ANIONGAP 9 02/11/2023    ANIONGAP 4 04/22/2022    ANIONGAP 6 01/29/2021     (H) 02/11/2023     (H) 08/08/2022     (H) 01/29/2021    BUN 16.6 02/11/2023    BUN 15 04/22/2022    BUN 14 01/29/2021    CR 1.21 (H) 02/11/2023    CR 1.03 01/29/2021    GFRESTIMATED 67 02/11/2023    GFRESTIMATED 77 01/29/2021    GFRESTBLACK 90 01/29/2021    FABRICIO 9.3 02/11/2023    FABRICIO 8.5 01/29/2021      A1C RESULTS:  Lab Results   Component Value Date    A1C 5.9 (H) 02/11/2023     INR RESULTS:  Lab Results   Component Value Date    INR 0.91 08/15/2010

## 2023-02-24 NOTE — PATIENT INSTRUCTIONS
Thank you for your visit with the [facility] today.    Today's Summary     Continue current medications, no changes today.    Follow up plan: 4-6 week clinic visit.    If you have questions or concerns, please do not hesitate to call my nursing support team at [phone].    Scheduling phone number: [phone]  [facility] Clinic Number: [phone]    It was a pleasure seeing you today.     JEFFREY Garcia, CNP  Nurse Practitioner  [facility]  February 24, 2023  ________________________________________________________

## 2023-02-27 ENCOUNTER — OFFICE VISIT (OUTPATIENT)
Dept: INTERNAL MEDICINE | Facility: CLINIC | Age: 63
End: 2023-02-27
Payer: COMMERCIAL

## 2023-02-27 VITALS
SYSTOLIC BLOOD PRESSURE: 119 MMHG | BODY MASS INDEX: 30.07 KG/M2 | HEIGHT: 68 IN | TEMPERATURE: 97.5 F | HEART RATE: 60 BPM | OXYGEN SATURATION: 96 % | WEIGHT: 198.4 LBS | DIASTOLIC BLOOD PRESSURE: 70 MMHG | RESPIRATION RATE: 18 BRPM

## 2023-02-27 DIAGNOSIS — E11.9 TYPE 2 DIABETES MELLITUS WITHOUT COMPLICATION, WITHOUT LONG-TERM CURRENT USE OF INSULIN (H): ICD-10-CM

## 2023-02-27 DIAGNOSIS — F33.41 RECURRENT MAJOR DEPRESSIVE DISORDER, IN PARTIAL REMISSION (H): ICD-10-CM

## 2023-02-27 DIAGNOSIS — Z23 HIGH PRIORITY FOR 2019-NCOV VACCINE: ICD-10-CM

## 2023-02-27 DIAGNOSIS — I48.91 ATRIAL FIBRILLATION, UNSPECIFIED TYPE (H): Primary | ICD-10-CM

## 2023-02-27 PROCEDURE — 91313 COVID-19 VACCINE BIVALENT BOOSTER 18+ (MODERNA): CPT | Performed by: INTERNAL MEDICINE

## 2023-02-27 PROCEDURE — 0134A COVID-19 VACCINE BIVALENT BOOSTER 18+ (MODERNA): CPT | Performed by: INTERNAL MEDICINE

## 2023-02-27 PROCEDURE — 99214 OFFICE O/P EST MOD 30 MIN: CPT | Mod: 25 | Performed by: INTERNAL MEDICINE

## 2023-02-27 ASSESSMENT — PATIENT HEALTH QUESTIONNAIRE - PHQ9
10. IF YOU CHECKED OFF ANY PROBLEMS, HOW DIFFICULT HAVE THESE PROBLEMS MADE IT FOR YOU TO DO YOUR WORK, TAKE CARE OF THINGS AT HOME, OR GET ALONG WITH OTHER PEOPLE: NOT DIFFICULT AT ALL
SUM OF ALL RESPONSES TO PHQ QUESTIONS 1-9: 2
SUM OF ALL RESPONSES TO PHQ QUESTIONS 1-9: 2

## 2023-02-27 NOTE — PROGRESS NOTES
"  Assessment & Plan     Atrial fibrillation, unspecified type (H)  Anticoagulation. Rate controlled     Type 2 diabetes mellitus without complication, without long-term current use of insulin (H)  - Adult Eye  Referral; Future    Recurrent major depressive disorder, in partial remission (H)  - Adult Mental Health  Referral; Future    High priority for 2019-nCoV vaccine  - COVID-19,PF,MODERNA BIVALENT 18+Yrs    Prescription drug management       MED REC REQUIRED  Post Medication Reconciliation Status:       BMI:   Estimated body mass index is 30.17 kg/m  as calculated from the following:    Height as of this encounter: 1.727 m (5' 8\").    Weight as of this encounter: 90 kg (198 lb 6.4 oz).       See Patient Instructions    Return in about 5 months (around 8/1/2023) for Preventative Visit.    Tha Ortega MD  [facility] LELO Casarez is a 62 year old, presenting for the following health issues:  Hospital F/U      [facility]     Hospital Follow-up Visit:    Hospital/Nursing Home/IP Rehab Facility: [facility]  Date of Admission: 02/10/2023  Date of Discharge: 02/11/2023  Reason(s) for Admission: New onset atrial fibrillation with RVR,  CAD with history of status post previous PCI -   Diabetes type 2 -Obstructive sleep apnea    Was your hospitalization related to COVID-19? No   Problems taking medications regularly:  None  Medication changes since discharge: None  Problems adhering to non-medication therapy:  None    Summary of hospitalization:  [facility] discharge summary reviewed  Diagnostic Tests/Treatments reviewed.  Follow up needed: none  Other Healthcare Providers Involved in Patient s Care:         Specialist appointment - cardiology  Update since discharge: improved.         Plan of care communicated with patient         62 years old male with reasonably good health and was admitted to the hospital with new " "onset atrial fibrillation that he was found to have RVR reportedly converted spontaneously, but patient was started on heparin drip.  Patient has a history of PCI and, diet-controlled diabetes type 2, obstructive sleep apnea compliant with CPAP and alcohol use unspecified.  He was wondering if echocardiogram cannot be done today, if he can do it as outpatient.  Patient denies any headache, visual disturbance, dizziness, nausea, vomiting, chest pain, heart palpitation, shortness of breath, fever, chills or fatigue.  Patient tolerated the heparin drip well.  According to cardiologist team, patient is safe to start on apixaban 5 mg twice a day for at least 4-weeks.  Patient currently is stable and recommend for patient to follow-up with his primary doctor 1 to 2-week after discharge and cardiology as needed.  Encouraged him to take his medication as prescribed.  This morning seen patient in bed and he appears to be in no acute distress.     #1.  New onset atrial fibrillation with RVR -spontaneous self converted.  Echocardiogram recommended by cardiologist, however echo could not be done today.  Heparin drip discontinued and bridged to Eliquis.  Patient requested to have echocardiogram as outpatient and okay by cardiologist.  Echocardiogram ordered by cardiologist team to be done as outpatient.  #2.  CAD with history of status post previous PCI -  #3.  Diabetes type 2 -diet and exercise controlled  #4.  Obstructive sleep apnea -on treatment and compliant with treatment.                 Review of Systems         Objective    /70   Pulse 60   Temp 97.5  F (36.4  C) (Temporal)   Resp 18   Ht 1.727 m (5' 8\")   Wt 90 kg (198 lb 6.4 oz)   SpO2 96%   BMI 30.17 kg/m    Body mass index is 30.17 kg/m .  Physical Exam   GENERAL: alert, no distress and over weight  HENT: normal cephalic/atraumatic  NECK: no adenopathy, no asymmetry, masses, or scars and thyroid normal to palpation  RESP: lungs clear to auscultation - " no rales, rhonchi or wheezes  CV: regular rate and rhythm, normal S1 S2, no S3 or S4, no murmur, click or rub, no peripheral edema and peripheral pulses strong  MS: no gross musculoskeletal defects noted, no edema

## 2023-04-18 NOTE — PROGRESS NOTES
~Cardiology Clinic Visit~    Primary Cardiologist: Dr. Carranza  Reason for visit: Afib follow up    History of Present Illness     Hermelindo Duffy is a very pleasant 62 year old male with a past medical history notable for CAD, s/p JIA to LAD 2010, HTN, DLD, hx of statin intolerance.  - Recent Hospitalization: 2/10/2023 - 2/11/2023 atrial fibrillation with RVR.     He has been seen in cardiology clinic for evaluation of coronary artery disease and dyslipidemia.  In 2010, he underwent stenting of the proximal LAD after for developing chest discomfort symptoms.  Fortunately, since then, he has not had any recurrent coronary artery disease complications or problems.       He did, however, developed some statin intolerance primarily with atorvastatin, mostly muscle pains.  He is able to tolerate a moderate dose of rosuvastatin 20 mg daily, as well as Zetia 10 mg daily for additional LDL control.     Over the years, he has maintained an active work life as a , but has not been doing much regular exercise.  His weight is stable and he has been feeling well without recurrent chest pains or shortness of breath symptoms.     Most recently, patient was admitted to [facility] on 2/10/2023 with new A-fib and found to be in RVR.  He did spontaneously convert.  Echocardiogram 02/11/2023 showed normal EF 55 to 60%, with normal LV systolic and diastolic function.   EKG performed in clinic was personally reviewed by me, and demonstrates sinus bradycardia.    When I saw him last he was doing well without any afib recurrence.    Interval 04/20/23    Again today, he is feeling well.  He continues to remain in a normal heart rhythm, and has had no symptoms concerning for afib recurrence.  Denies CP, LH or dizziness, SOB, palpitations, fast HR, etc.  __________________________________________________________________         Assessment and Impression:     Atrial fibrillation, new, single episode  -On apixaban,  metoprolol.  -[id number] score 2  -Echocardiogram 2/11/23 showed normal left atrial size, right atrial size is normal.  -No recurrence again today.  Of note, was symptomatic with initial afib episode.     Coronary artery disease  History of LAD stenting, 2010  -On aspirin, ezetimibe, Lopressor, rosuvastatin.  -Asymptomatic.     Hyperlipidemia with LDL goal < 70  -Recent LDL 78, on rosuvastatin and Zetia.  -Intolerant to higher doses of statins due to muscle aches.     Diabetes type 2, diet controlled.  KIMBERLY, on CPAP  Alcohol use, 2-3 glasses of wine daily.         Recommendations and Plan:     1. Today, I recommend we stop Eliquis as he has remained stable from a rhythm standpoint.  2. Start reducing Metoprolol by decreasing dose to 12.5 mg twice a day.  3. Counseled patient to continue tracking HR on smart-watch.  Counseled him on when to call clinic for new or concerning symptoms, as well as when to seek emergent care.  4. Follow up in clinic with MAGUI in about 3-4 months.  If he remains free from any arrhthymias at that time, we can discontinue Metoprolol all together.  __________________________________________________________________    Thank you for the opportunity to participate in this pleasant patient's care.    We would be happy to see this patient sooner for any concerns in the meantime.    JEFFREY Garcia, CNP   Nurse Practitioner  [facility]    Today's clinic visit entailed:  Review of the result(s) of each unique test - echo, hospital reports, ekg  Prescription drug management    The level of medical decision making during this visit was of moderate complexity.    Orders this Visit:  Orders Placed This Encounter   Procedures     Follow-Up with Cardiology- MAGUI     Orders Placed This Encounter   Medications     Guselkumab (TREMFYA SC)     Sig: Inject Subcutaneous every 2 months     pantoprazole (PROTONIX) 40 MG EC tablet     Sig: Take 1 tablet (40 mg) by mouth daily      Dispense:  90 tablet     Refill:  1     metoprolol tartrate (LOPRESSOR) 25 MG tablet     Sig: Take 0.5 tablets (12.5 mg) by mouth 2 times daily     Dispense:  60 tablet     Refill:  1     Medications Discontinued During This Encounter   Medication Reason     apixaban ANTICOAGULANT (ELIQUIS) 5 MG tablet      metoprolol tartrate (LOPRESSOR) 25 MG tablet      Encounter Diagnoses   Name Primary?     Coronary artery disease involving native coronary artery of native heart without angina pectoris      New onset atrial fibrillation (H) Yes     Hyperlipidemia LDL goal <70      Type 2 diabetes mellitus without complication, without long-term current use of insulin (H)      CURRENT MEDICATIONS:  Current Outpatient Medications   Medication Sig Dispense Refill     aspirin 81 MG EC tablet Take 81 mg by mouth daily       buPROPion (WELLBUTRIN XL) 150 MG 24 hr tablet Take 1 tablet (150 mg) by mouth every morning 90 tablet 3     cetirizine (ZYRTEC) 10 MG tablet Take 10 mg by mouth daily as needed (For itching)       citalopram (CELEXA) 20 MG tablet Take 1 tablet (20 mg) by mouth daily 90 tablet 3     doxycycline hyclate (VIBRAMYCIN) 50 MG capsule Take 50 mg by mouth daily       Dupilumab (DUPIXENT) 300 MG/2ML syringe Inject 300 mg Subcutaneous every 14 days       ezetimibe (ZETIA) 10 MG tablet Take 1 tablet (10 mg) by mouth daily Appointment required for further refills 90 tablet 3     famotidine (PEPCID) 20 MG tablet Take 20 mg by mouth daily as needed       fluticasone (FLONASE) 50 MCG/ACT spray Spray 2 sprays into both nostrils daily 1 Bottle 11     Guselkumab (TREMFYA SC) Inject Subcutaneous every 2 months       metoprolol tartrate (LOPRESSOR) 25 MG tablet Take 0.5 tablets (12.5 mg) by mouth 2 times daily 60 tablet 1     pantoprazole (PROTONIX) 40 MG EC tablet Take 1 tablet (40 mg) by mouth daily 90 tablet 1     rosuvastatin (CRESTOR) 20 MG tablet Take 1 tablet (20 mg) by mouth daily 90 tablet 3     ALLERGIES     Allergies  "  Allergen Reactions     Atorvastatin      Muscle aches     Questran [Cholestyramine]      Nausea     PAST MEDICAL, SURGICAL, FAMILY, SOCIAL HISTORY:  History was reviewed and updated as needed, see medical record.    Review of Systems:  A 10-point Review Of Systems is otherwise normal except for that which is noted in the HPI and interval summary.    Physical Exam:    Vitals: /72   Pulse 63   Ht 1.727 m (5' 8\")   Wt 89.8 kg (198 lb)   SpO2 96%   BMI 30.11 kg/m    Constitutional: Appears stated age, well nourished, NAD.  Eyes: Pupils equal, round. Sclerae anicteric.   HEENT: Normocephalic, atraumatic.   Neck: Supple. Carotid pulses full and equal. No carotid bruit.  No JVD appreciated.  Respiratory: Non-labored. Lungs CTAB.  Cardiovascular: RRR, normal S1 and S2. No M/G/R.  No edema.  GI: Soft, non-distended, non-tender.  Skin: Warm and dry. No rashes, cyanosis, edema.  Musculoskeletal/Extremities: Symmetrical movement to all extremities.  Neurologic: No gross focal deficits. Alert, awake, and oriented to all spheres.  Psychiatric: Affect appropriate. Mentation normal.    Recent Lab Results:  LIPID RESULTS:  Lab Results   Component Value Date    CHOL 150 04/22/2022    CHOL 147 01/29/2021    HDL 46 04/22/2022    HDL 44 01/29/2021    LDL 78 04/22/2022    LDL 62 01/29/2021    TRIG 128 04/22/2022    TRIG 207 (H) 01/29/2021    CHOLHDLRATIO 4.9 (H) 05/20/2015     LIVER ENZYME RESULTS:  Lab Results   Component Value Date    AST 28 08/09/2021    AST 36 07/03/2014    ALT 57 04/22/2022    ALT 60 01/29/2021     CBC RESULTS:  Lab Results   Component Value Date    WBC 13.1 (H) 02/11/2023    WBC 6.8 08/07/2015    RBC 4.63 02/11/2023    RBC 4.67 08/07/2015    HGB 14.7 02/11/2023    HGB 15.1 08/07/2015    HCT 44.5 02/11/2023    HCT 43.6 08/07/2015    MCV 96 02/11/2023    MCV 93 08/07/2015    MCH 31.7 02/11/2023    MCH 32.3 08/07/2015    MCHC 33.0 02/11/2023    MCHC 34.6 08/07/2015    RDW 12.8 02/11/2023    RDW 12.6 " 08/07/2015     02/11/2023     08/07/2015     BMP RESULTS:  Lab Results   Component Value Date     02/11/2023     01/29/2021    POTASSIUM 4.4 02/11/2023    POTASSIUM 4.2 04/22/2022    POTASSIUM 4.1 01/29/2021    CHLORIDE 102 02/11/2023    CHLORIDE 108 04/22/2022    CHLORIDE 110 (H) 01/29/2021    CO2 27 02/11/2023    CO2 26 04/22/2022    CO2 25 01/29/2021    ANIONGAP 9 02/11/2023    ANIONGAP 4 04/22/2022    ANIONGAP 6 01/29/2021     (H) 02/11/2023     (H) 08/08/2022     (H) 01/29/2021    BUN 16.6 02/11/2023    BUN 15 04/22/2022    BUN 14 01/29/2021    CR 1.21 (H) 02/11/2023    CR 1.03 01/29/2021    GFRESTIMATED 67 02/11/2023    GFRESTIMATED 77 01/29/2021    GFRESTBLACK 90 01/29/2021    FABRICIO 9.3 02/11/2023    FABRICIO 8.5 01/29/2021      A1C RESULTS:  Lab Results   Component Value Date    A1C 5.9 (H) 02/11/2023     INR RESULTS:  Lab Results   Component Value Date    INR 0.91 08/15/2010

## 2023-04-20 ENCOUNTER — OFFICE VISIT (OUTPATIENT)
Dept: CARDIOLOGY | Facility: CLINIC | Age: 63
End: 2023-04-20
Attending: NURSE PRACTITIONER
Payer: COMMERCIAL

## 2023-04-20 VITALS
HEIGHT: 68 IN | DIASTOLIC BLOOD PRESSURE: 72 MMHG | SYSTOLIC BLOOD PRESSURE: 116 MMHG | OXYGEN SATURATION: 96 % | BODY MASS INDEX: 30.01 KG/M2 | HEART RATE: 63 BPM | WEIGHT: 198 LBS

## 2023-04-20 DIAGNOSIS — I25.10 CORONARY ARTERY DISEASE INVOLVING NATIVE CORONARY ARTERY OF NATIVE HEART WITHOUT ANGINA PECTORIS: ICD-10-CM

## 2023-04-20 DIAGNOSIS — I48.91 NEW ONSET ATRIAL FIBRILLATION (H): Primary | ICD-10-CM

## 2023-04-20 DIAGNOSIS — E11.9 TYPE 2 DIABETES MELLITUS WITHOUT COMPLICATION, WITHOUT LONG-TERM CURRENT USE OF INSULIN (H): ICD-10-CM

## 2023-04-20 DIAGNOSIS — E78.5 HYPERLIPIDEMIA LDL GOAL <70: ICD-10-CM

## 2023-04-20 PROCEDURE — 99214 OFFICE O/P EST MOD 30 MIN: CPT | Performed by: NURSE PRACTITIONER

## 2023-04-20 RX ORDER — METOPROLOL TARTRATE 25 MG/1
12.5 TABLET, FILM COATED ORAL 2 TIMES DAILY
Qty: 60 TABLET | Refills: 1 | Status: SHIPPED | OUTPATIENT
Start: 2023-04-20 | End: 2023-06-12

## 2023-04-20 RX ORDER — PANTOPRAZOLE SODIUM 40 MG/1
40 TABLET, DELAYED RELEASE ORAL DAILY
Qty: 90 TABLET | Refills: 1 | Status: SHIPPED | OUTPATIENT
Start: 2023-04-20 | End: 2023-07-21

## 2023-04-20 NOTE — PATIENT INSTRUCTIONS
Thank you for your visit with the [facility] today.    Today's Summary     It sounds like you have maintained a normal heart rhythm without any recurrence of atrial fibrillation.  Today, stop the blood thinner, Eliquis.  Hold on to it in case you go back into afib.  Reduce Metoprolol to 12.5 mg twice a day.  Continue taking Pantoprazole (Protonix) for stomach protection.  Continue tracking your heart rate and rhythm at home  Notify the clinic of any new symptoms or resumption of afib.  Follow up in about 3-4 months with MAGUI (end of summer).    If you have questions or concerns, please do not hesitate to call my nursing support team at [phone].    Scheduling phone number: [phone]  [facility] Clinic Number: [phone]    It was a pleasure seeing you today.     JEFFREY Garcia, CNP  Nurse Practitioner  [facility]  April 20, 2023  ________________________________________________________

## 2023-04-20 NOTE — LETTER
4/20/2023    Tha Ortega MD  [address]  [facility] [address]    RE: Hermelindo Duffy       Dear Colleague,     I had the pleasure of seeing Hermelindo Duffy in the [facility].              ~Cardiology Clinic Visit~    Primary Cardiologist: Dr. Carranza  Reason for visit: Afib follow up    History of Present Illness     Hermelindo Duffy is a very pleasant 62 year old male with a past medical history notable for CAD, s/p JIA to LAD 2010, HTN, DLD, hx of statin intolerance.  - Recent Hospitalization: 2/10/2023 - 2/11/2023 atrial fibrillation with RVR.     He has been seen in cardiology clinic for evaluation of coronary artery disease and dyslipidemia.  In 2010, he underwent stenting of the proximal LAD after for developing chest discomfort symptoms.  Fortunately, since then, he has not had any recurrent coronary artery disease complications or problems.       He did, however, developed some statin intolerance primarily with atorvastatin, mostly muscle pains.  He is able to tolerate a moderate dose of rosuvastatin 20 mg daily, as well as Zetia 10 mg daily for additional LDL control.     Over the years, he has maintained an active work life as a , but has not been doing much regular exercise.  His weight is stable and he has been feeling well without recurrent chest pains or shortness of breath symptoms.     Most recently, patient was admitted to [facility] on 2/10/2023 with new A-fib and found to be in RVR.  He did spontaneously convert.  Echocardiogram 02/11/2023 showed normal EF 55 to 60%, with normal LV systolic and diastolic function.   EKG performed in clinic was personally reviewed by me, and demonstrates sinus bradycardia.    When I saw him last he was doing well without any afib recurrence.    Interval 04/20/23    Again today, he is feeling well.  He continues to remain in a normal heart rhythm, and has had no symptoms concerning for afib recurrence.  Denies CP,  LH or dizziness, SOB, palpitations, fast HR, etc.  __________________________________________________________________         Assessment and Impression:     Atrial fibrillation, new, single episode  -On apixaban, metoprolol.  -[id number] score 2  -Echocardiogram 2/11/23 showed normal left atrial size, right atrial size is normal.  -No recurrence again today.  Of note, was symptomatic with initial afib episode.     Coronary artery disease  History of LAD stenting, 2010  -On aspirin, ezetimibe, Lopressor, rosuvastatin.  -Asymptomatic.     Hyperlipidemia with LDL goal < 70  -Recent LDL 78, on rosuvastatin and Zetia.  -Intolerant to higher doses of statins due to muscle aches.     Diabetes type 2, diet controlled.  KIMBERLY, on CPAP  Alcohol use, 2-3 glasses of wine daily.         Recommendations and Plan:     Today, I recommend we stop Eliquis as he has remained stable from a rhythm standpoint.  Start reducing Metoprolol by decreasing dose to 12.5 mg twice a day.  Counseled patient to continue tracking HR on smart-watch.  Counseled him on when to call clinic for new or concerning symptoms, as well as when to seek emergent care.  Follow up in clinic with MAGUI in about 3-4 months.  If he remains free from any arrhthymias at that time, we can discontinue Metoprolol all together.  __________________________________________________________________    Thank you for the opportunity to participate in this pleasant patient's care.    We would be happy to see this patient sooner for any concerns in the meantime.    JEFFREY Garcia, CNP   Nurse Practitioner  [facility] - [facility]    Today's clinic visit entailed:  Review of the result(s) of each unique test - echo, hospital reports, ekg  Prescription drug management    The level of medical decision making during this visit was of moderate complexity.    Orders this Visit:  Orders Placed This Encounter   Procedures    Follow-Up with Cardiology- MAG[facility]     Orders Placed  This Encounter   Medications    Guselkumab (TREMFYA SC)     Sig: Inject Subcutaneous every 2 months    pantoprazole (PROTONIX) 40 MG EC tablet     Sig: Take 1 tablet (40 mg) by mouth daily     Dispense:  90 tablet     Refill:  1    metoprolol tartrate (LOPRESSOR) 25 MG tablet     Sig: Take 0.5 tablets (12.5 mg) by mouth 2 times daily     Dispense:  60 tablet     Refill:  1     Medications Discontinued During This Encounter   Medication Reason    apixaban ANTICOAGULANT (ELIQUIS) 5 MG tablet     metoprolol tartrate (LOPRESSOR) 25 MG tablet      Encounter Diagnoses   Name Primary?    Coronary artery disease involving native coronary artery of native heart without angina pectoris     New onset atrial fibrillation (H) Yes    Hyperlipidemia LDL goal <70     Type 2 diabetes mellitus without complication, without long-term current use of insulin (H)      CURRENT MEDICATIONS:  Current Outpatient Medications   Medication Sig Dispense Refill    aspirin 81 MG EC tablet Take 81 mg by mouth daily      buPROPion (WELLBUTRIN XL) 150 MG 24 hr tablet Take 1 tablet (150 mg) by mouth every morning 90 tablet 3    cetirizine (ZYRTEC) 10 MG tablet Take 10 mg by mouth daily as needed (For itching)      citalopram (CELEXA) 20 MG tablet Take 1 tablet (20 mg) by mouth daily 90 tablet 3    doxycycline hyclate (VIBRAMYCIN) 50 MG capsule Take 50 mg by mouth daily      Dupilumab (DUPIXENT) 300 MG/2ML syringe Inject 300 mg Subcutaneous every 14 days      ezetimibe (ZETIA) 10 MG tablet Take 1 tablet (10 mg) by mouth daily Appointment required for further refills 90 tablet 3    famotidine (PEPCID) 20 MG tablet Take 20 mg by mouth daily as needed      fluticasone (FLONASE) 50 MCG/ACT spray Spray 2 sprays into both nostrils daily 1 Bottle 11    Guselkumab (TREMFYA SC) Inject Subcutaneous every 2 months      metoprolol tartrate (LOPRESSOR) 25 MG tablet Take 0.5 tablets (12.5 mg) by mouth 2 times daily 60 tablet 1    pantoprazole (PROTONIX) 40 MG EC  "tablet Take 1 tablet (40 mg) by mouth daily 90 tablet 1    rosuvastatin (CRESTOR) 20 MG tablet Take 1 tablet (20 mg) by mouth daily 90 tablet 3     ALLERGIES     Allergies   Allergen Reactions    Atorvastatin      Muscle aches    Questran [Cholestyramine]      Nausea     PAST MEDICAL, SURGICAL, FAMILY, SOCIAL HISTORY:  History was reviewed and updated as needed, see medical record.    Review of Systems:  A 10-point Review Of Systems is otherwise normal except for that which is noted in the HPI and interval summary.    Physical Exam:    Vitals: /72   Pulse 63   Ht 1.727 m (5' 8\")   Wt 89.8 kg (198 lb)   SpO2 96%   BMI 30.11 kg/m    Constitutional: Appears stated age, well nourished, NAD.  Eyes: Pupils equal, round. Sclerae anicteric.   HEENT: Normocephalic, atraumatic.   Neck: Supple. Carotid pulses full and equal. No carotid bruit.  No JVD appreciated.  Respiratory: Non-labored. Lungs CTAB.  Cardiovascular: RRR, normal S1 and S2. No M/G/R.  No edema.  GI: Soft, non-distended, non-tender.  Skin: Warm and dry. No rashes, cyanosis, edema.  Musculoskeletal/Extremities: Symmetrical movement to all extremities.  Neurologic: No gross focal deficits. Alert, awake, and oriented to all spheres.  Psychiatric: Affect appropriate. Mentation normal.    Recent Lab Results:  LIPID RESULTS:  Lab Results   Component Value Date    CHOL 150 04/22/2022    CHOL 147 01/29/2021    HDL 46 04/22/2022    HDL 44 01/29/2021    LDL 78 04/22/2022    LDL 62 01/29/2021    TRIG 128 04/22/2022    TRIG 207 (H) 01/29/2021    CHOLHDLRATIO 4.9 (H) 05/20/2015     LIVER ENZYME RESULTS:  Lab Results   Component Value Date    AST 28 08/09/2021    AST 36 07/03/2014    ALT 57 04/22/2022    ALT 60 01/29/2021     CBC RESULTS:  Lab Results   Component Value Date    WBC 13.1 (H) 02/11/2023    WBC 6.8 08/07/2015    RBC 4.63 02/11/2023    RBC 4.67 08/07/2015    HGB 14.7 02/11/2023    HGB 15.1 08/07/2015    HCT 44.5 02/11/2023    HCT 43.6 08/07/2015    MCV " 96 02/11/2023    MCV 93 08/07/2015    MCH 31.7 02/11/2023    MCH 32.3 08/07/2015    MCHC 33.0 02/11/2023    MCHC 34.6 08/07/2015    RDW 12.8 02/11/2023    RDW 12.6 08/07/2015     02/11/2023     08/07/2015     BMP RESULTS:  Lab Results   Component Value Date     02/11/2023     01/29/2021    POTASSIUM 4.4 02/11/2023    POTASSIUM 4.2 04/22/2022    POTASSIUM 4.1 01/29/2021    CHLORIDE 102 02/11/2023    CHLORIDE 108 04/22/2022    CHLORIDE 110 (H) 01/29/2021    CO2 27 02/11/2023    CO2 26 04/22/2022    CO2 25 01/29/2021    ANIONGAP 9 02/11/2023    ANIONGAP 4 04/22/2022    ANIONGAP 6 01/29/2021     (H) 02/11/2023     (H) 08/08/2022     (H) 01/29/2021    BUN 16.6 02/11/2023    BUN 15 04/22/2022    BUN 14 01/29/2021    CR 1.21 (H) 02/11/2023    CR 1.03 01/29/2021    GFRESTIMATED 67 02/11/2023    GFRESTIMATED 77 01/29/2021    GFRESTBLACK 90 01/29/2021    FABRICIO 9.3 02/11/2023    FABRICIO 8.5 01/29/2021      A1C RESULTS:  Lab Results   Component Value Date    A1C 5.9 (H) 02/11/2023     INR RESULTS:  Lab Results   Component Value Date    INR 0.91 08/15/2010     Thank you for allowing me to participate in the care of your patient.      Sincerely,     JEFFREY Garcia CNP     [facility]  cc:   JEFFREY Garcia CNP  [address]  [place] [address]

## 2023-05-16 DIAGNOSIS — E78.5 HYPERLIPIDEMIA LDL GOAL <70: ICD-10-CM

## 2023-05-16 DIAGNOSIS — I25.10 CORONARY ARTERY DISEASE INVOLVING NATIVE CORONARY ARTERY OF NATIVE HEART WITHOUT ANGINA PECTORIS: ICD-10-CM

## 2023-05-16 RX ORDER — EZETIMIBE 10 MG/1
10 TABLET ORAL DAILY
Qty: 90 TABLET | Refills: 0 | Status: SHIPPED | OUTPATIENT
Start: 2023-05-16 | End: 2023-09-06

## 2023-06-01 ENCOUNTER — HEALTH MAINTENANCE LETTER (OUTPATIENT)
Age: 63
End: 2023-06-01

## 2023-06-12 ENCOUNTER — MYC MEDICAL ADVICE (OUTPATIENT)
Dept: CARDIOLOGY | Facility: CLINIC | Age: 63
End: 2023-06-12
Payer: COMMERCIAL

## 2023-06-12 DIAGNOSIS — I48.91 NEW ONSET ATRIAL FIBRILLATION (H): ICD-10-CM

## 2023-06-12 RX ORDER — METOPROLOL TARTRATE 25 MG/1
12.5 TABLET, FILM COATED ORAL 2 TIMES DAILY
Qty: 60 TABLET | Refills: 1 | Status: SHIPPED | OUTPATIENT
Start: 2023-06-12 | End: 2023-09-08

## 2023-06-15 ENCOUNTER — MYC MEDICAL ADVICE (OUTPATIENT)
Dept: SLEEP MEDICINE | Facility: CLINIC | Age: 63
End: 2023-06-15
Payer: COMMERCIAL

## 2023-06-15 DIAGNOSIS — E78.00 HYPERCHOLESTEROLEMIA: ICD-10-CM

## 2023-06-15 DIAGNOSIS — G47.33 OSA (OBSTRUCTIVE SLEEP APNEA): Primary | ICD-10-CM

## 2023-06-15 RX ORDER — ROSUVASTATIN CALCIUM 20 MG/1
20 TABLET, COATED ORAL DAILY
Qty: 90 TABLET | Refills: 3 | Status: SHIPPED | OUTPATIENT
Start: 2023-06-15 | End: 2024-06-25

## 2023-06-15 NOTE — PROGRESS NOTES
REFILL REQUEST    [facility] Refill Guideline reviewed.  Medication meets criteria for refill.    Whit Mccoy RN 06/15/23 2:14 PM

## 2023-06-15 NOTE — TELEPHONE ENCOUNTER
Patient requesting a supply script, LOV 4/11/22, follow up not scheduled.  Order pended for provider consideration.  Will send message to patient to schedule a follow up.

## 2023-07-03 ENCOUNTER — PATIENT OUTREACH (OUTPATIENT)
Dept: CARE COORDINATION | Facility: CLINIC | Age: 63
End: 2023-07-03
Payer: COMMERCIAL

## 2023-07-17 ENCOUNTER — PATIENT OUTREACH (OUTPATIENT)
Dept: CARE COORDINATION | Facility: CLINIC | Age: 63
End: 2023-07-17
Payer: COMMERCIAL

## 2023-07-21 ENCOUNTER — MYC MEDICAL ADVICE (OUTPATIENT)
Dept: CARDIOLOGY | Facility: CLINIC | Age: 63
End: 2023-07-21
Payer: COMMERCIAL

## 2023-07-21 DIAGNOSIS — I48.91 NEW ONSET ATRIAL FIBRILLATION (H): ICD-10-CM

## 2023-07-21 RX ORDER — PANTOPRAZOLE SODIUM 40 MG/1
40 TABLET, DELAYED RELEASE ORAL DAILY
Qty: 90 TABLET | Refills: 0 | Status: SHIPPED | OUTPATIENT
Start: 2023-07-21 | End: 2023-11-01

## 2023-09-06 DIAGNOSIS — E78.5 HYPERLIPIDEMIA LDL GOAL <70: ICD-10-CM

## 2023-09-06 DIAGNOSIS — I25.10 CORONARY ARTERY DISEASE INVOLVING NATIVE CORONARY ARTERY OF NATIVE HEART WITHOUT ANGINA PECTORIS: ICD-10-CM

## 2023-09-06 RX ORDER — EZETIMIBE 10 MG/1
10 TABLET ORAL DAILY
Qty: 90 TABLET | Refills: 0 | Status: SHIPPED | OUTPATIENT
Start: 2023-09-06 | End: 2023-09-08

## 2023-09-07 NOTE — PROGRESS NOTES
~Cardiology Clinic Visit~    Primary Cardiologist: Dr. Carranza  Reason for visit: 4-month medication review    History of Present Illness   Hermelindo Duffy is a very pleasant 62 year old male with a past medical history notable for CAD, s/p JIA to LAD 2010, HTN, DLD, hx of statin intolerance.  - Recent Hospitalization: 2/10/2023 - 2/11/2023 atrial fibrillation with RVR.     He has been seen in cardiology clinic for evaluation of coronary artery disease and dyslipidemia.  In 2010, he underwent stenting of the proximal LAD after for developing chest discomfort symptoms.  Fortunately, since then, he has not had any recurrent coronary artery disease complications or problems.       He did, however, developed some statin intolerance primarily with atorvastatin, mostly muscle pains.  He is able to tolerate a moderate dose of rosuvastatin 20 mg daily, as well as Zetia 10 mg daily for additional LDL control.     Over the years, he has maintained an active work life as a , but has not been doing much regular exercise.  His weight is stable and he has been feeling well without recurrent chest pains or shortness of breath symptoms.     Most recently, patient was admitted to [facility] on 2/10/2023 with new A-fib and found to be in RVR.  He did spontaneously convert.  Echocardiogram 02/11/2023 showed normal EF 55 to 60%, with normal LV systolic and diastolic function.   EKG performed in clinic was personally reviewed by me, and demonstrates sinus bradycardia.     When I saw him last he was doing well without any afib recurrence.     Interval 04/20/23     Again today, he is feeling well.  He continues to remain in a normal heart rhythm, and has had no symptoms concerning for afib recurrence.  Denies CP, LH or dizziness, SOB, palpitations, fast HR, etc.    Interval 09/08/23    Hermelindo is doing well today.  He is looking forward to the wedding of his youngest daughter in the next 2-weeks in [place].  He is happy to  be able to enjoy the wedding and festivities of this event.  He has had absolutely no AF recurrence.  He tracks his HR on his apple watch and has no concerns.  __________________________________________________________________         Assessment and Impression:     Atrial fibrillation, new, single episode  -On metoprolol.  -[id number] score 2  -Echocardiogram 2/11/23 showed normal left atrial size, right atrial size is normal.  -No recurrence again today.  Of note, was symptomatic with initial afib episode.     Coronary artery disease  History of LAD stenting, 2010  -On aspirin, ezetimibe, Lopressor, rosuvastatin.  -Asymptomatic.     Hyperlipidemia with LDL goal < 70  -Recent LDL 78, on rosuvastatin and Zetia.  -Intolerant to higher doses of statins due to muscle aches.     Diabetes type 2, diet controlled.  KIMBERLY, on CPAP  Alcohol use, 2-3 glasses of wine daily.         Recommendations and Plan:     OK to stop Metoprolol.  Counseled patient on AF recurrence and when to call clinic for additional care.  Continue healthy diet and lifestyle to reduce AF recurrence.  Follow up with Dr. Carranza in 1-year for annual review.  __________________________________________________________________    Thank you for the opportunity to participate in this pleasant patient's care.    We would be happy to see this patient sooner for any concerns in the meantime.    JEFFREY Garcia, CNP   Nurse Practitioner  [facility]    Today's clinic visit entailed:  Review of the result(s) of each unique test - cardiac testing and imaging, hospital records, labs  Prescription drug management    The level of medical decision making during this visit was of moderate complexity.    Orders this Visit:  Orders Placed This Encounter   Procedures    Follow-Up with Cardiology     Orders Placed This Encounter   Medications    minocycline (MINOCIN) 100 MG capsule    ezetimibe (ZETIA) 10 MG tablet     Sig: Take 1 tablet (10 mg) by  mouth daily     Dispense:  90 tablet     Refill:  4     Medications Discontinued During This Encounter   Medication Reason    doxycycline hyclate (VIBRAMYCIN) 50 MG capsule Alternate therapy    metoprolol tartrate (LOPRESSOR) 25 MG tablet     ezetimibe (ZETIA) 10 MG tablet Reorder (No AVS)     Encounter Diagnoses   Name Primary?    Coronary artery disease involving native coronary artery of native heart without angina pectoris     Hyperlipidemia LDL goal <70     Type 2 diabetes mellitus with hyperosmolarity without coma, without long-term current use of insulin (H)     Paroxysmal atrial fibrillation (H) Yes     CURRENT MEDICATIONS:  Current Outpatient Medications   Medication Sig Dispense Refill    aspirin 81 MG EC tablet Take 81 mg by mouth daily      buPROPion (WELLBUTRIN XL) 150 MG 24 hr tablet Take 1 tablet (150 mg) by mouth every morning 90 tablet 3    cetirizine (ZYRTEC) 10 MG tablet Take 10 mg by mouth daily as needed (For itching)      citalopram (CELEXA) 20 MG tablet Take 1 tablet (20 mg) by mouth daily 90 tablet 3    Dupilumab (DUPIXENT) 300 MG/2ML syringe Inject 300 mg Subcutaneous every 14 days      ezetimibe (ZETIA) 10 MG tablet Take 1 tablet (10 mg) by mouth daily 90 tablet 4    famotidine (PEPCID) 20 MG tablet Take 20 mg by mouth daily as needed      fluticasone (FLONASE) 50 MCG/ACT spray Spray 2 sprays into both nostrils daily 1 Bottle 11    Guselkumab (TREMFYA SC) Inject Subcutaneous every 2 months      minocycline (MINOCIN) 100 MG capsule       pantoprazole (PROTONIX) 40 MG EC tablet Take 1 tablet (40 mg) by mouth daily 90 tablet 0    rosuvastatin (CRESTOR) 20 MG tablet Take 1 tablet (20 mg) by mouth daily 90 tablet 3     ALLERGIES     Allergies   Allergen Reactions    Atorvastatin      Muscle aches    Questran [Cholestyramine]      Nausea     PAST MEDICAL, SURGICAL, FAMILY, SOCIAL HISTORY:  History was reviewed and updated as needed, see medical record.    Review of Systems:  A 10-point Review Of  "Systems is otherwise normal except for that which is noted in the HPI and interval summary.    Physical Exam:    Vitals: /68   Pulse 61   Ht 1.727 m (5' 8\")   Wt 92.7 kg (204 lb 4.8 oz)   SpO2 97%   BMI 31.06 kg/m    Constitutional: Appears stated age, well nourished, NAD.  Neck: Supple. Carotid pulses full and equal.   Respiratory: Non-labored. Lungs CTAB.  Cardiovascular: RRR, normal S1 and S2. No M/G/R.  No edema.  Skin: Warm and dry. No rashes, cyanosis, edema.  Musculoskeletal/Extremities: Symmetrical movement to all extremities.  Neurologic: No gross focal deficits. Alert, awake, and oriented to all spheres.  Psychiatric: Affect appropriate. Mentation normal.    Recent Lab Results:  LIPID RESULTS:  Lab Results   Component Value Date    CHOL 150 04/22/2022    CHOL 147 01/29/2021    HDL 46 04/22/2022    HDL 44 01/29/2021    LDL 78 04/22/2022    LDL 62 01/29/2021    TRIG 128 04/22/2022    TRIG 207 (H) 01/29/2021    CHOLHDLRATIO 4.9 (H) 05/20/2015     LIVER ENZYME RESULTS:  Lab Results   Component Value Date    AST 28 08/09/2021    AST 36 07/03/2014    ALT 57 04/22/2022    ALT 60 01/29/2021     CBC RESULTS:  Lab Results   Component Value Date    WBC 13.1 (H) 02/11/2023    WBC 6.8 08/07/2015    RBC 4.63 02/11/2023    RBC 4.67 08/07/2015    HGB 14.7 02/11/2023    HGB 15.1 08/07/2015    HCT 44.5 02/11/2023    HCT 43.6 08/07/2015    MCV 96 02/11/2023    MCV 93 08/07/2015    MCH 31.7 02/11/2023    MCH 32.3 08/07/2015    MCHC 33.0 02/11/2023    MCHC 34.6 08/07/2015    RDW 12.8 02/11/2023    RDW 12.6 08/07/2015     02/11/2023     08/07/2015     BMP RESULTS:  Lab Results   Component Value Date     02/11/2023     01/29/2021    POTASSIUM 4.4 02/11/2023    POTASSIUM 4.2 04/22/2022    POTASSIUM 4.1 01/29/2021    CHLORIDE 102 02/11/2023    CHLORIDE 108 04/22/2022    CHLORIDE 110 (H) 01/29/2021    CO2 27 02/11/2023    CO2 26 04/22/2022    CO2 25 01/29/2021    ANIONGAP 9 02/11/2023    " ANIONGAP 4 04/22/2022    ANIONGAP 6 01/29/2021     (H) 02/11/2023     (H) 08/08/2022     (H) 01/29/2021    BUN 16.6 02/11/2023    BUN 15 04/22/2022    BUN 14 01/29/2021    CR 1.21 (H) 02/11/2023    CR 1.03 01/29/2021    GFRESTIMATED 67 02/11/2023    GFRESTIMATED 77 01/29/2021    GFRESTBLACK 90 01/29/2021    FABRICIO 9.3 02/11/2023    FABRICIO 8.5 01/29/2021      A1C RESULTS:  Lab Results   Component Value Date    A1C 5.9 (H) 02/11/2023     INR RESULTS:  Lab Results   Component Value Date    INR 0.91 08/15/2010

## 2023-09-08 ENCOUNTER — OFFICE VISIT (OUTPATIENT)
Dept: CARDIOLOGY | Facility: CLINIC | Age: 63
End: 2023-09-08
Payer: COMMERCIAL

## 2023-09-08 VITALS
HEART RATE: 61 BPM | OXYGEN SATURATION: 97 % | BODY MASS INDEX: 30.96 KG/M2 | DIASTOLIC BLOOD PRESSURE: 68 MMHG | HEIGHT: 68 IN | SYSTOLIC BLOOD PRESSURE: 106 MMHG | WEIGHT: 204.3 LBS

## 2023-09-08 DIAGNOSIS — E11.00 TYPE 2 DIABETES MELLITUS WITH HYPEROSMOLARITY WITHOUT COMA, WITHOUT LONG-TERM CURRENT USE OF INSULIN (H): ICD-10-CM

## 2023-09-08 DIAGNOSIS — I48.0 PAROXYSMAL ATRIAL FIBRILLATION (H): Primary | ICD-10-CM

## 2023-09-08 DIAGNOSIS — E78.5 HYPERLIPIDEMIA LDL GOAL <70: ICD-10-CM

## 2023-09-08 DIAGNOSIS — I25.10 CORONARY ARTERY DISEASE INVOLVING NATIVE CORONARY ARTERY OF NATIVE HEART WITHOUT ANGINA PECTORIS: ICD-10-CM

## 2023-09-08 PROCEDURE — 99214 OFFICE O/P EST MOD 30 MIN: CPT | Performed by: NURSE PRACTITIONER

## 2023-09-08 RX ORDER — EZETIMIBE 10 MG/1
10 TABLET ORAL DAILY
Qty: 90 TABLET | Refills: 4 | Status: SHIPPED | OUTPATIENT
Start: 2023-09-08 | End: 2024-09-27

## 2023-09-08 RX ORDER — MINOCYCLINE HYDROCHLORIDE 100 MG/1
CAPSULE ORAL
COMMUNITY
Start: 2023-08-23

## 2023-09-08 NOTE — LETTER
9/8/2023    Tha Ortega MD  [address]  [place] [address]    RE: Hermelindo Duffy       Dear Colleague,     I had the pleasure of seeing Hermelindo Duffy in the [facility].              ~Cardiology Clinic Visit~    Primary Cardiologist: Dr. Carranza  Reason for visit: 4-month medication review    History of Present Illness   Hermelindo Duffy is a very pleasant 62 year old male with a past medical history notable for CAD, s/p JIA to LAD 2010, HTN, DLD, hx of statin intolerance.  - Recent Hospitalization: 2/10/2023 - 2/11/2023 atrial fibrillation with RVR.     He has been seen in cardiology clinic for evaluation of coronary artery disease and dyslipidemia.  In 2010, he underwent stenting of the proximal LAD after for developing chest discomfort symptoms.  Fortunately, since then, he has not had any recurrent coronary artery disease complications or problems.       He did, however, developed some statin intolerance primarily with atorvastatin, mostly muscle pains.  He is able to tolerate a moderate dose of rosuvastatin 20 mg daily, as well as Zetia 10 mg daily for additional LDL control.     Over the years, he has maintained an active work life as a , but has not been doing much regular exercise.  His weight is stable and he has been feeling well without recurrent chest pains or shortness of breath symptoms.     Most recently, patient was admitted to [facility] on 2/10/2023 with new A-fib and found to be in RVR.  He did spontaneously convert.  Echocardiogram 02/11/2023 showed normal EF 55 to 60%, with normal LV systolic and diastolic function.   EKG performed in clinic was personally reviewed by me, and demonstrates sinus bradycardia.     When I saw him last he was doing well without any afib recurrence.     Interval 04/20/23     Again today, he is feeling well.  He continues to remain in a normal heart rhythm, and has had no symptoms concerning for afib recurrence.   Denies CP, LH or dizziness, SOB, palpitations, fast HR, etc.    Interval 09/08/23    Hermelindo is doing well today.  He is looking forward to the wedding of his youngest daughter in the next 2-weeks in [place].  He is happy to be able to enjoy the wedding and festivities of this event.  He has had absolutely no AF recurrence.  He tracks his HR on his apple watch and has no concerns.  __________________________________________________________________         Assessment and Impression:     Atrial fibrillation, new, single episode  -On metoprolol.  -[id number] score 2  -Echocardiogram 2/11/23 showed normal left atrial size, right atrial size is normal.  -No recurrence again today.  Of note, was symptomatic with initial afib episode.     Coronary artery disease  History of LAD stenting, 2010  -On aspirin, ezetimibe, Lopressor, rosuvastatin.  -Asymptomatic.     Hyperlipidemia with LDL goal < 70  -Recent LDL 78, on rosuvastatin and Zetia.  -Intolerant to higher doses of statins due to muscle aches.     Diabetes type 2, diet controlled.  KIMBERLY, on CPAP  Alcohol use, 2-3 glasses of wine daily.         Recommendations and Plan:     OK to stop Metoprolol.  Counseled patient on AF recurrence and when to call clinic for additional care.  Continue healthy diet and lifestyle to reduce AF recurrence.  Follow up with Dr. Carranza in 1-year for annual review.  __________________________________________________________________    Thank you for the opportunity to participate in this pleasant patient's care.    We would be happy to see this patient sooner for any concerns in the meantime.    JEFFREY Garcia, CNP   Nurse Practitioner  [facility]    Today's clinic visit entailed:  Review of the result(s) of each unique test - cardiac testing and imaging, hospital records, labs  Prescription drug management    The level of medical decision making during this visit was of moderate complexity.    Orders this  Visit:  Orders Placed This Encounter   Procedures    Follow-Up with Cardiology     Orders Placed This Encounter   Medications    minocycline (MINOCIN) 100 MG capsule    ezetimibe (ZETIA) 10 MG tablet     Sig: Take 1 tablet (10 mg) by mouth daily     Dispense:  90 tablet     Refill:  4     Medications Discontinued During This Encounter   Medication Reason    doxycycline hyclate (VIBRAMYCIN) 50 MG capsule Alternate therapy    metoprolol tartrate (LOPRESSOR) 25 MG tablet     ezetimibe (ZETIA) 10 MG tablet Reorder (No AVS)     Encounter Diagnoses   Name Primary?    Coronary artery disease involving native coronary artery of native heart without angina pectoris     Hyperlipidemia LDL goal <70     Type 2 diabetes mellitus with hyperosmolarity without coma, without long-term current use of insulin (H)     Paroxysmal atrial fibrillation (H) Yes     CURRENT MEDICATIONS:  Current Outpatient Medications   Medication Sig Dispense Refill    aspirin 81 MG EC tablet Take 81 mg by mouth daily      buPROPion (WELLBUTRIN XL) 150 MG 24 hr tablet Take 1 tablet (150 mg) by mouth every morning 90 tablet 3    cetirizine (ZYRTEC) 10 MG tablet Take 10 mg by mouth daily as needed (For itching)      citalopram (CELEXA) 20 MG tablet Take 1 tablet (20 mg) by mouth daily 90 tablet 3    Dupilumab (DUPIXENT) 300 MG/2ML syringe Inject 300 mg Subcutaneous every 14 days      ezetimibe (ZETIA) 10 MG tablet Take 1 tablet (10 mg) by mouth daily 90 tablet 4    famotidine (PEPCID) 20 MG tablet Take 20 mg by mouth daily as needed      fluticasone (FLONASE) 50 MCG/ACT spray Spray 2 sprays into both nostrils daily 1 Bottle 11    Guselkumab (TREMFYA SC) Inject Subcutaneous every 2 months      minocycline (MINOCIN) 100 MG capsule       pantoprazole (PROTONIX) 40 MG EC tablet Take 1 tablet (40 mg) by mouth daily 90 tablet 0    rosuvastatin (CRESTOR) 20 MG tablet Take 1 tablet (20 mg) by mouth daily 90 tablet 3     ALLERGIES     Allergies   Allergen Reactions     "Atorvastatin      Muscle aches    Questran [Cholestyramine]      Nausea     PAST MEDICAL, SURGICAL, FAMILY, SOCIAL HISTORY:  History was reviewed and updated as needed, see medical record.    Review of Systems:  A 10-point Review Of Systems is otherwise normal except for that which is noted in the HPI and interval summary.    Physical Exam:    Vitals: /68   Pulse 61   Ht 1.727 m (5' 8\")   Wt 92.7 kg (204 lb 4.8 oz)   SpO2 97%   BMI 31.06 kg/m    Constitutional: Appears stated age, well nourished, NAD.  Neck: Supple. Carotid pulses full and equal.   Respiratory: Non-labored. Lungs CTAB.  Cardiovascular: RRR, normal S1 and S2. No M/G/R.  No edema.  Skin: Warm and dry. No rashes, cyanosis, edema.  Musculoskeletal/Extremities: Symmetrical movement to all extremities.  Neurologic: No gross focal deficits. Alert, awake, and oriented to all spheres.  Psychiatric: Affect appropriate. Mentation normal.    Recent Lab Results:  LIPID RESULTS:  Lab Results   Component Value Date    CHOL 150 04/22/2022    CHOL 147 01/29/2021    HDL 46 04/22/2022    HDL 44 01/29/2021    LDL 78 04/22/2022    LDL 62 01/29/2021    TRIG 128 04/22/2022    TRIG 207 (H) 01/29/2021    CHOLHDLRATIO 4.9 (H) 05/20/2015     LIVER ENZYME RESULTS:  Lab Results   Component Value Date    AST 28 08/09/2021    AST 36 07/03/2014    ALT 57 04/22/2022    ALT 60 01/29/2021     CBC RESULTS:  Lab Results   Component Value Date    WBC 13.1 (H) 02/11/2023    WBC 6.8 08/07/2015    RBC 4.63 02/11/2023    RBC 4.67 08/07/2015    HGB 14.7 02/11/2023    HGB 15.1 08/07/2015    HCT 44.5 02/11/2023    HCT 43.6 08/07/2015    MCV 96 02/11/2023    MCV 93 08/07/2015    MCH 31.7 02/11/2023    MCH 32.3 08/07/2015    MCHC 33.0 02/11/2023    MCHC 34.6 08/07/2015    RDW 12.8 02/11/2023    RDW 12.6 08/07/2015     02/11/2023     08/07/2015     BMP RESULTS:  Lab Results   Component Value Date     02/11/2023     01/29/2021    POTASSIUM 4.4 02/11/2023    " POTASSIUM 4.2 04/22/2022    POTASSIUM 4.1 01/29/2021    CHLORIDE 102 02/11/2023    CHLORIDE 108 04/22/2022    CHLORIDE 110 (H) 01/29/2021    CO2 27 02/11/2023    CO2 26 04/22/2022    CO2 25 01/29/2021    ANIONGAP 9 02/11/2023    ANIONGAP 4 04/22/2022    ANIONGAP 6 01/29/2021     (H) 02/11/2023     (H) 08/08/2022     (H) 01/29/2021    BUN 16.6 02/11/2023    BUN 15 04/22/2022    BUN 14 01/29/2021    CR 1.21 (H) 02/11/2023    CR 1.03 01/29/2021    GFRESTIMATED 67 02/11/2023    GFRESTIMATED 77 01/29/2021    GFRESTBLACK 90 01/29/2021    FABRICIO 9.3 02/11/2023    FABRICIO 8.5 01/29/2021      A1C RESULTS:  Lab Results   Component Value Date    A1C 5.9 (H) 02/11/2023     INR RESULTS:  Lab Results   Component Value Date    INR 0.91 08/15/2010       Thank you for allowing me to participate in the care of your patient.      Sincerely,     JEFFREY Garcia CNP     [facility]  cc:   No referring provider defined for this encounter.

## 2023-09-08 NOTE — PATIENT INSTRUCTIONS
Thank you for your visit with the [facility] today.    Today's Summary     For the next 3-days (over the weekend), drop down to once a day dosing for the Metoprolol 12.5 mg.  On Monday, you can stop the medication all together.  If you notice any at fast heart rates, palpitations, or recurrent atrial fibrillation then please call the clinic to discuss next steps.  Follow up in 1-year with Dr. Carranza.    If you have questions or concerns, please do not hesitate to call my nursing support team at [phone].    Scheduling phone number: [phone]  [facility] Clinic Number: [phone]    It was a pleasure seeing you today.     JEFFREY Garcia, CNP  Nurse Practitioner  [facility]  September 8, 2023  ________________________________________________________

## 2023-09-15 ENCOUNTER — MYC MEDICAL ADVICE (OUTPATIENT)
Dept: PHARMACY | Facility: CLINIC | Age: 63
End: 2023-09-15
Payer: COMMERCIAL

## 2023-09-29 NOTE — TELEPHONE ENCOUNTER
MTM referral from: Patient's insurance     MTM referral outreach attempt #2 on September 29, 2023       Outcome: Left message    Brenda Caraballo, PharmD  MTM Pharmacist  [facility]

## 2023-10-02 DIAGNOSIS — F33.0 MAJOR DEPRESSIVE DISORDER, RECURRENT EPISODE, MILD (H): ICD-10-CM

## 2023-10-02 RX ORDER — CITALOPRAM HYDROBROMIDE 20 MG/1
20 TABLET ORAL DAILY
Qty: 90 TABLET | Refills: 0 | Status: SHIPPED | OUTPATIENT
Start: 2023-10-02 | End: 2023-12-29

## 2023-10-02 RX ORDER — BUPROPION HYDROCHLORIDE 150 MG/1
150 TABLET ORAL EVERY MORNING
Qty: 90 TABLET | Refills: 0 | Status: SHIPPED | OUTPATIENT
Start: 2023-10-02 | End: 2023-12-29

## 2023-10-16 ENCOUNTER — TELEPHONE (OUTPATIENT)
Dept: PHARMACY | Facility: CLINIC | Age: 63
End: 2023-10-16
Payer: COMMERCIAL

## 2023-10-16 NOTE — TELEPHONE ENCOUNTER
MTM referral from: Patient's insurance      MTM referral outreach attempt #3 on October 16, 2023      Outcome: MAIRA Castillo PharmD  Medication Therapy Management Pharmacist  [facility]

## 2023-11-01 DIAGNOSIS — I48.91 NEW ONSET ATRIAL FIBRILLATION (H): ICD-10-CM

## 2023-11-01 RX ORDER — PANTOPRAZOLE SODIUM 40 MG/1
40 TABLET, DELAYED RELEASE ORAL DAILY
Qty: 90 TABLET | Refills: 0 | Status: SHIPPED | OUTPATIENT
Start: 2023-11-01 | End: 2024-01-29

## 2023-11-05 ENCOUNTER — HEALTH MAINTENANCE LETTER (OUTPATIENT)
Age: 63
End: 2023-11-05

## 2023-12-29 DIAGNOSIS — F33.0 MAJOR DEPRESSIVE DISORDER, RECURRENT EPISODE, MILD (H): ICD-10-CM

## 2023-12-29 RX ORDER — BUPROPION HYDROCHLORIDE 150 MG/1
TABLET ORAL
Qty: 90 TABLET | Refills: 0 | Status: SHIPPED | OUTPATIENT
Start: 2023-12-29 | End: 2024-01-15

## 2023-12-29 RX ORDER — CITALOPRAM HYDROBROMIDE 20 MG/1
20 TABLET ORAL DAILY
Qty: 90 TABLET | Refills: 0 | Status: SHIPPED | OUTPATIENT
Start: 2023-12-29 | End: 2024-01-15

## 2024-01-14 ASSESSMENT — ENCOUNTER SYMPTOMS
CONSTIPATION: 0
CHILLS: 0
FREQUENCY: 0
PALPITATIONS: 0
SORE THROAT: 0
DYSURIA: 0
ARTHRALGIAS: 0
HEMATOCHEZIA: 0
NAUSEA: 0
DIZZINESS: 0
DIARRHEA: 0
JOINT SWELLING: 0
HEARTBURN: 1
WEAKNESS: 0
EYE PAIN: 0
ABDOMINAL PAIN: 0
MYALGIAS: 0
HEMATURIA: 0
NERVOUS/ANXIOUS: 0
HEADACHES: 0
COUGH: 0
SHORTNESS OF BREATH: 0
FEVER: 0
PARESTHESIAS: 0

## 2024-01-14 ASSESSMENT — PATIENT HEALTH QUESTIONNAIRE - PHQ9
10. IF YOU CHECKED OFF ANY PROBLEMS, HOW DIFFICULT HAVE THESE PROBLEMS MADE IT FOR YOU TO DO YOUR WORK, TAKE CARE OF THINGS AT HOME, OR GET ALONG WITH OTHER PEOPLE: SOMEWHAT DIFFICULT
SUM OF ALL RESPONSES TO PHQ QUESTIONS 1-9: 5
SUM OF ALL RESPONSES TO PHQ QUESTIONS 1-9: 5

## 2024-01-14 ASSESSMENT — ACTIVITIES OF DAILY LIVING (ADL): CURRENT_FUNCTION: NO ASSISTANCE NEEDED

## 2024-01-15 ENCOUNTER — OFFICE VISIT (OUTPATIENT)
Dept: INTERNAL MEDICINE | Facility: CLINIC | Age: 64
End: 2024-01-15
Payer: COMMERCIAL

## 2024-01-15 VITALS
BODY MASS INDEX: 32.43 KG/M2 | HEIGHT: 67 IN | DIASTOLIC BLOOD PRESSURE: 74 MMHG | TEMPERATURE: 97.2 F | HEART RATE: 75 BPM | SYSTOLIC BLOOD PRESSURE: 116 MMHG | OXYGEN SATURATION: 98 % | WEIGHT: 206.6 LBS

## 2024-01-15 DIAGNOSIS — I25.10 CORONARY ARTERY DISEASE INVOLVING NATIVE CORONARY ARTERY OF NATIVE HEART WITHOUT ANGINA PECTORIS: ICD-10-CM

## 2024-01-15 DIAGNOSIS — E11.9 TYPE 2 DIABETES MELLITUS WITHOUT COMPLICATION, WITHOUT LONG-TERM CURRENT USE OF INSULIN (H): ICD-10-CM

## 2024-01-15 DIAGNOSIS — R79.89 ELEVATED SERUM CREATININE: ICD-10-CM

## 2024-01-15 DIAGNOSIS — Z12.5 PROSTATE CANCER SCREENING: ICD-10-CM

## 2024-01-15 DIAGNOSIS — F33.0 MAJOR DEPRESSIVE DISORDER, RECURRENT EPISODE, MILD (H): ICD-10-CM

## 2024-01-15 DIAGNOSIS — Z00.00 ENCOUNTER FOR ANNUAL PHYSICAL EXAM: Primary | ICD-10-CM

## 2024-01-15 LAB
ANION GAP SERPL CALCULATED.3IONS-SCNC: 10 MMOL/L (ref 7–15)
BUN SERPL-MCNC: 17.6 MG/DL (ref 8–23)
CALCIUM SERPL-MCNC: 9.4 MG/DL (ref 8.8–10.2)
CHLORIDE SERPL-SCNC: 108 MMOL/L (ref 98–107)
CHOLEST SERPL-MCNC: 168 MG/DL
CREAT SERPL-MCNC: 1.46 MG/DL (ref 0.67–1.17)
CREAT UR-MCNC: 160 MG/DL
DEPRECATED HCO3 PLAS-SCNC: 26 MMOL/L (ref 22–29)
EGFRCR SERPLBLD CKD-EPI 2021: 53 ML/MIN/1.73M2
FASTING STATUS PATIENT QL REPORTED: NO
GLUCOSE SERPL-MCNC: 89 MG/DL (ref 70–99)
HBA1C MFR BLD: 6 % (ref 0–5.6)
HDLC SERPL-MCNC: 50 MG/DL
LDLC SERPL CALC-MCNC: 88 MG/DL
MICROALBUMIN UR-MCNC: <12 MG/L
MICROALBUMIN/CREAT UR: NORMAL MG/G{CREAT}
NONHDLC SERPL-MCNC: 118 MG/DL
POTASSIUM SERPL-SCNC: 4.4 MMOL/L (ref 3.4–5.3)
PSA SERPL DL<=0.01 NG/ML-MCNC: 0.65 NG/ML (ref 0–4.5)
SODIUM SERPL-SCNC: 144 MMOL/L (ref 135–145)
TRIGL SERPL-MCNC: 149 MG/DL

## 2024-01-15 PROCEDURE — 91320 SARSCV2 VAC 30MCG TRS-SUC IM: CPT | Performed by: INTERNAL MEDICINE

## 2024-01-15 PROCEDURE — 80048 BASIC METABOLIC PNL TOTAL CA: CPT | Performed by: INTERNAL MEDICINE

## 2024-01-15 PROCEDURE — G0103 PSA SCREENING: HCPCS | Performed by: INTERNAL MEDICINE

## 2024-01-15 PROCEDURE — 99397 PER PM REEVAL EST PAT 65+ YR: CPT | Mod: 25 | Performed by: INTERNAL MEDICINE

## 2024-01-15 PROCEDURE — 83036 HEMOGLOBIN GLYCOSYLATED A1C: CPT | Performed by: INTERNAL MEDICINE

## 2024-01-15 PROCEDURE — 90471 IMMUNIZATION ADMIN: CPT | Performed by: INTERNAL MEDICINE

## 2024-01-15 PROCEDURE — 82570 ASSAY OF URINE CREATININE: CPT | Performed by: INTERNAL MEDICINE

## 2024-01-15 PROCEDURE — 36415 COLL VENOUS BLD VENIPUNCTURE: CPT | Performed by: INTERNAL MEDICINE

## 2024-01-15 PROCEDURE — 90480 ADMN SARSCOV2 VAC 1/ONLY CMP: CPT | Performed by: INTERNAL MEDICINE

## 2024-01-15 PROCEDURE — 99213 OFFICE O/P EST LOW 20 MIN: CPT | Mod: 25 | Performed by: INTERNAL MEDICINE

## 2024-01-15 PROCEDURE — 82043 UR ALBUMIN QUANTITATIVE: CPT | Performed by: INTERNAL MEDICINE

## 2024-01-15 PROCEDURE — 80061 LIPID PANEL: CPT | Performed by: INTERNAL MEDICINE

## 2024-01-15 PROCEDURE — 90677 PCV20 VACCINE IM: CPT | Performed by: INTERNAL MEDICINE

## 2024-01-15 RX ORDER — BUPROPION HYDROCHLORIDE 150 MG/1
TABLET ORAL
Qty: 90 TABLET | Refills: 3 | Status: SHIPPED | OUTPATIENT
Start: 2024-01-15

## 2024-01-15 RX ORDER — CITALOPRAM HYDROBROMIDE 20 MG/1
20 TABLET ORAL DAILY
Qty: 90 TABLET | Refills: 3 | Status: SHIPPED | OUTPATIENT
Start: 2024-01-15

## 2024-01-15 ASSESSMENT — ENCOUNTER SYMPTOMS
DIZZINESS: 0
HEARTBURN: 1
ABDOMINAL PAIN: 0
DYSURIA: 0
SHORTNESS OF BREATH: 0
NERVOUS/ANXIOUS: 0
ARTHRALGIAS: 0
NAUSEA: 0
PALPITATIONS: 0
CONSTIPATION: 0
JOINT SWELLING: 0
PARESTHESIAS: 0
DIARRHEA: 0
HEMATOCHEZIA: 0
FREQUENCY: 0
SORE THROAT: 0
COUGH: 0
HEADACHES: 0
WEAKNESS: 0
HEMATURIA: 0
MYALGIAS: 0
EYE PAIN: 0
CHILLS: 0
FEVER: 0

## 2024-01-15 ASSESSMENT — ACTIVITIES OF DAILY LIVING (ADL): CURRENT_FUNCTION: NO ASSISTANCE NEEDED

## 2024-01-15 NOTE — COMMUNITY RESOURCES LIST (ENGLISH)
01/15/2024   [facility] [employer]  N/A  For questions about this resource list or additional care needs, please contact your primary care clinic or care manager.  Phone: [phone]   Email: N/A   Address: [address] [place] [id number]   Hours: N/A        Financial Stability       Utility payment assistance  1  [employer] [facility] Distance: 4.59 miles      In-Person, Phone/Virtual   [address] [place] [address]  Language: English  Hours: Mon 4:00 PM - 6:00 PM , Tue 11:00 AM - 1:00 PM , Wed 4:00 PM - 6:00 PM , Thu 10:30 AM - 12:30 PM  Fees: Free   Phone: (958) 626-9921 Email: resources@[employer].org Website: https://[email or web address]/[place]/mission-outpost/     2  [employer] ([facility]) [facility] - Energy Assistance Program (EAP) Distance: 7.84 miles      Phone/Virtual   [address] [place] [address] [facility]  Language: English, Hong [place]er  Hours: Mon - Fri 8:00 AM - 4:30 PM  Fees: Free   Phone: (388) 628-1656 Email: info@[employer].org Website: https://www.capagency.org/          Important Numbers & Websites       Emergency Services   911  [facility]   311  Poison Control   (576) 534-4076  Suicide Prevention Lifeline   (963) 411-8899 (TALK)  Child Abuse Hotline   (366) 364-9420 (4-A-Child)  Sexual Assault Hotline   (121) 194-7792 (HOPE)  National Runaway Safeline   (680) 845-3794 (RUNAWAY)  All-Options Talkline   (556) 371-1895  Substance Abuse Referral   (631) 954-6082 (HELP)

## 2024-01-15 NOTE — PATIENT INSTRUCTIONS
"Patient Education   Personalized Prevention Plan  You are due for the preventive services outlined below.  Your care team is available to assist you in scheduling these services.  If you have already completed any of these items, please share that information with your care team to update in your medical record.  Health Maintenance Due   Topic Date Due     Kidney Microalbumin Urine Test  Never done     Diabetic Foot Exam  Never done     Depression Action Plan  Never done     Eye Exam  Never done     RSV VACCINE (Pregnancy & 60+) (1 - 1-dose 60+ series) Never done     Pneumococcal Vaccine (2 of 2 - PPSV23 or PCV20) 01/22/2019     Cholesterol Lab  04/22/2023     A1C Lab  05/11/2023     ANNUAL REVIEW OF HM ORDERS  08/02/2023     Flu Vaccine (1) 09/01/2023     COVID-19 Vaccine (5 - 2023-24 season) 09/01/2023     Annual Wellness Visit  09/11/2023     AORTIC ANEURYSM SCREENING (SYSTEM ASSIGNED)  Never done     Basic Metabolic Panel  02/11/2024     Learning About Being Physically Active  What is physical activity?     Being physically active means doing any kind of activity that gets your body moving.  The types of physical activity that can help you get fit and stay healthy include:  Aerobic or \"cardio\" activities. These make your heart beat faster and make you breathe harder, such as brisk walking, riding a bike, or running. They strengthen your heart and lungs and build up your endurance.  Strength training activities. These make your muscles work against, or \"resist,\" something. Examples include lifting weights or doing push-ups. These activities help tone and strengthen your muscles and bones.  Stretches. These let you move your joints and muscles through their full range of motion. Stretching helps you be more flexible.  Reaching a balance between these three types of physical activity is important because each one contributes to your overall fitness.  What are the benefits of being active?  Being active is one of " "the best things you can do for your health. It helps you to:  Feel stronger and have more energy to do all the things you like to do.  Focus better at school or work.  Feel, think, and sleep better.  Reach and stay at a healthy weight.  Lose fat and build lean muscle.  Lower your risk for serious health problems, including diabetes, heart attack, high blood pressure, and some cancers.  Keep your heart, lungs, bones, muscles, and joints strong and healthy.  How can you make being active part of your life?  Start slowly. Make it your long-term goal to get at least 30 minutes of exercise on most days of the week. Walking is a good choice. You also may want to do other activities, such as running, swimming, cycling, or playing tennis or team sports.  Pick activities that you like--ones that make your heart beat faster, your muscles stronger, and your muscles and joints more flexible. If you find more than one thing you like doing, do them all. You don't have to do the same thing every day.  Get your heart pumping every day. Any activity that makes your heart beat faster and keeps it at that rate for a while counts.  Here are some great ways to get your heart beating faster:  Go for a brisk walk, run, or hike.  Go for a swim or bike ride.  Take an online exercise class or dance.  Play a game of touch football, basketball, or soccer.  Play tennis, pickleball, or racquetball.  Climb stairs.  Even some household chores can be aerobic. Just do them at a faster pace. Raking or mowing the lawn, sweeping the garage, and vacuuming and cleaning your home all can help get your heart rate up.  Strengthen your muscles during the week. You don't have to lift heavy weights or grow big, bulky muscles to get stronger. Doing a few simple activities that make your muscles work against, or \"resist,\" something can help you get stronger. Aim for at least twice a week.  For example, you can:  Do push-ups or sit-ups, which use your own body " "weight as resistance.  Lift weights or dumbbells or use stretch bands at home or in a gym or community center.  Stretch your muscles often. Stretching will help you as you become more active. It can help you stay flexible and loosen tight muscles. It can also help improve your balance and posture and can be a great way to relax.  Be sure to stretch the muscles you'll be using when you work out. It's best to warm your muscles slightly before you stretch them. Walk or do some other light aerobic activity for a few minutes. Then start stretching.  When you stretch your muscles:  Do it slowly. Stretching is not about going fast or making sudden movements.  Don't push or bounce during a stretch.  Hold each stretch for at least 15 to 30 seconds, if you can. You should feel a stretch in the muscle, but not pain.  Breathe out as you do the stretch. Then breathe in as you hold the stretch. Don't hold your breath.  If you're worried about how more activity might affect your health, have a checkup before you start. Follow any special advice your doctor gives you for getting a smart start.  Where can you learn more?  Go to https://www.Farmol.net/patiented  Enter W332 in the search box to learn more about \"Learning About Being Physically Active.\"  Current as of: June 6, 2023               Content Version: 13.8    9541-2514 [employer].   Care instructions adapted under license by your healthcare professional. If you have questions about a medical condition or this instruction, always ask your healthcare professional. [employer] disclaims any warranty or liability for your use of this information.      Learning About Dietary Guidelines  What are the Dietary Guidelines for Americans?     Dietary Guidelines for Americans provide tips for eating well and staying healthy. This helps reduce the risk for long-term (chronic) diseases.  These guidelines recommend that you:  Eat and drink the right amount " for you. The U.[place] government's food guide is called MyPlate. It can help you make your own well-balanced eating plan.  Try to balance your eating with your activity. This helps you stay at a healthy weight.  Drink alcohol in moderation, if at all.  Limit foods high in salt, saturated fat, trans fat, and added sugar.  These guidelines are from the U.S. Department of Agriculture and the U.S. Department of Health and Human Services. They are updated every 5 years.  What is MyPlate?  MyPlate is the U.[place] government's food guide. It can help you make your own well-balanced eating plan. A balanced eating plan means that you eat enough, but not too much, and that your food gives you the nutrients you need to stay healthy.  MyPlate focuses on eating plenty of whole grains, fruits, and vegetables, and on limiting fat and sugar. It is available online at www.ChooseMyPlate.gov.  How can you get started?  If you're trying to eat healthier, you can slowly change your eating habits over time. You don't have to make big changes all at once. Start by adding one or two healthy foods to your meals each day.  Grains  Choose whole-grain breads and cereals and whole-wheat pasta and whole-grain crackers.  Vegetables  Eat a variety of vegetables every day. They have lots of nutrients and are part of a heart-healthy diet.  Fruits  Eat a variety of fruits every day. Fruits contain lots of nutrients. Choose fresh fruit instead of fruit juice.  Protein foods  Choose fish and lean poultry more often. Eat red meat and fried meats less often. Dried beans, tofu, and nuts are also good sources of protein.  Dairy  Choose low-fat or fat-free products from this food group. If you have problems digesting milk, try eating cheese or yogurt instead.  Fats and oils  Limit fats and oils if you're trying to cut calories. Choose healthy fats when you cook. These include canola oil and olive oil.  Where can you learn more?  Go to  "https://www.PlanG.net/patiented  Enter D676 in the search box to learn more about \"Learning About Dietary Guidelines.\"  Current as of: February 28, 2023               Content Version: 13.8    9615-2325 [employer].   Care instructions adapted under license by your healthcare professional. If you have questions about a medical condition or this instruction, always ask your healthcare professional. [employer] disclaims any warranty or liability for your use of this information.      Your Health Risk Assessment indicates you feel you are not in good emotional health.    Recreation   Recreation is not limited to sports and team events. It includes any activity that provides relaxation, interest, enjoyment, and exercise. Recreation provides an outlet for physical, mental, and social energy. It can give a sense of worth and achievement. It can help you stay healthy.    Mental Exercise and Social Involvement  Mental and emotional health is as important as physical health. Keep in touch with friends and family. Stay as active as possible. Continue to learn and challenge yourself.   Things you can do to stay mentally active are:  Learn something new, like a foreign language or musical instrument.   Play SCRABBLE or do crossword puzzles. If you cannot find people to play these games with you at home, you can play them with others on your computer through the Internet.   Join a games club--anything from card games to chess or checkers or lawn bowling.   Start a new hobby.   Go back to school.   Volunteer.   Read.   Keep up with world events.  Learning About Depression Screening  What is depression screening?  Depression screening is a way to see if you have depression symptoms. It may be done by a doctor or counselor. It's often part of a routine checkup. That's because your mental health is just as important as your physical health.  Depression is a mental health condition that affects how you " "feel, think, and act. You may:  Have less energy.  Lose interest in your daily activities.  Feel sad and grouchy for a long time.  Depression is very common. It affects people of all ages.  Many things can lead to depression. Some people become depressed after they have a stroke or find out they have a major illness like cancer or heart disease. The death of a loved one or a breakup may lead to depression. It can run in families. Most experts believe that a combination of inherited genes and stressful life events can cause it.  What happens during screening?  You may be asked to fill out a form about your depression symptoms. You and the doctor will discuss your answers. The doctor may ask you more questions to learn more about how you think, act, and feel.  What happens after screening?  If you have symptoms of depression, your doctor will talk to you about your options.  Doctors usually treat depression with medicines or counseling. Often, combining the two works best. Many people don't get help because they think that they'll get over the depression on their own. But people with depression may not get better unless they get treatment.  The cause of depression is not well understood. There may be many factors involved. But if you have depression, it's not your fault.  A serious symptom of depression is thinking about death or suicide. If you or someone you care about talks about this or about feeling hopeless, get help right away.  It's important to know that depression can be treated. Medicine, counseling, and self-care may help.  Where can you learn more?  Go to https://www.Sakti3.net/patiented  Enter T185 in the search box to learn more about \"Learning About Depression Screening.\"  Current as of: June 25, 2023               Content Version: 13.8    1736-6844 [employer].   Care instructions adapted under license by your healthcare professional. If you have questions about a medical condition or " this instruction, always ask your healthcare professional. [employer] disclaims any warranty or liability for your use of this information.

## 2024-01-15 NOTE — PROGRESS NOTES
"SUBJECTIVE:   Hermelindo is a 63 year old, presenting for the following:  Physical         No data to display                Healthy Habits:     In general, how would you rate your overall health?  Good    Frequency of exercise:  None    Do you usually eat at least 4 servings of fruit and vegetables a day, include whole grains    & fiber and avoid regularly eating high fat or \"junk\" foods?  No    Taking medications regularly:  Yes    Medication side effects:  None    Ability to successfully perform activities of daily living:  No assistance needed    Home Safety:  No safety concerns identified    Hearing Impairment:  No hearing concerns    In the past 6 months, have you been bothered by leaking of urine?  No    In general, how would you rate your overall mental or emotional health?  Fair      Today's PHQ-9 Score:       1/14/2024     4:19 PM   PHQ-9 SCORE   PHQ-9 Total Score MyChart 5 (Mild depression)   PHQ-9 Total Score 5                   Social History     Tobacco Use    Smoking status: Never    Smokeless tobacco: Never   Substance Use Topics    Alcohol use: Yes     Alcohol/week: 7.0 standard drinks of alcohol     Types: 7 Glasses of wine per week     Comment: 1-2 glasses wine day             1/14/2024     4:22 PM   Alcohol Use   Prescreen: >3 drinks/day or >7 drinks/week? No       Last PSA:   Prostate Specific Antigen Screen   Date Value Ref Range Status   08/08/2022 0.74 0.00 - 4.00 ug/L Final       Reviewed orders with patient. Reviewed health maintenance and updated orders accordingly - Yes  Lab work is in process  Labs reviewed in EPIC    Reviewed and updated as needed this visit by clinical staff   Tobacco  Allergies  Meds  Problems    [facility] Hx          Reviewed and updated as needed this visit by Provider    Allergies  Meds  Problems    [facility] Hx             Review of Systems   Constitutional:  Negative for chills and fever.   HENT:  Negative for congestion, ear pain, hearing loss and sore throat.    Eyes:  " "Negative for pain and visual disturbance.   Respiratory:  Negative for cough and shortness of breath.    Cardiovascular:  Negative for chest pain, palpitations and peripheral edema.   Gastrointestinal:  Positive for heartburn. Negative for abdominal pain, constipation, diarrhea, hematochezia and nausea.   Genitourinary:  Negative for dysuria, frequency, genital sores, hematuria, impotence, penile discharge and urgency.   Musculoskeletal:  Negative for arthralgias, joint swelling and myalgias.   Skin:  Negative for rash.   Neurological:  Negative for dizziness, weakness, headaches and paresthesias.   Psychiatric/Behavioral:  Positive for mood changes. The patient is not nervous/anxious.          OBJECTIVE:   /74   Pulse 75   Temp 97.2  F (36.2  C)   Ht 1.702 m (5' 7\")   Wt 93.7 kg (206 lb 9.6 oz)   SpO2 98%   BMI 32.36 kg/m      Physical Exam  GENERAL: healthy, alert and no distress  EYES: Eyes grossly normal to inspection, PERRL and conjunctivae and sclerae normal  HENT: ear canals and TM's normal, nose and mouth without ulcers or lesions  NECK: no adenopathy, no asymmetry, masses, or scars and thyroid normal to palpation  RESP: lungs clear to auscultation - no rales, rhonchi or wheezes  CV: regular rate and rhythm, normal S1 S2, no S3 or S4, no murmur, click or rub, no peripheral edema and peripheral pulses strong  ABDOMEN: soft, nontender, no hepatosplenomegaly, no masses and bowel sounds normal  MS: no gross musculoskeletal defects noted, no edema  SKIN: no suspicious lesions or rashes  NEURO: Normal strength and tone, mentation intact and speech normal  PSYCH: mentation appears normal, affect normal/bright  LYMPH: no cervical adenopathy    Diagnostic Test Results:  Results for orders placed or performed in visit on 01/15/24 (from the past 24 hour(s))   HEMOGLOBIN A1C   Result Value Ref Range    Hemoglobin A1C 6.0 (H) 0.0 - 5.6 %       ASSESSMENT/PLAN:       ICD-10-CM    1. Encounter for annual " "physical exam  Z00.00       2. Type 2 diabetes mellitus without complication, without long-term current use of insulin (H)  E11.9 Albumin Random Urine Quantitative with Creat Ratio     HEMOGLOBIN A1C     BASIC METABOLIC PANEL     COLOGUARD(EXACT SCIENCES)     Albumin Random Urine Quantitative with Creat Ratio     HEMOGLOBIN A1C     BASIC METABOLIC PANEL      3. Coronary artery disease involving native coronary artery of native heart without angina pectoris  I25.10 Lipid panel reflex to direct LDL Non-fasting     Lipid panel reflex to direct LDL Non-fasting      4. Major depressive disorder, recurrent episode, mild (H24)  F33.0 buPROPion (WELLBUTRIN XL) 150 MG 24 hr tablet     citalopram (CELEXA) 20 MG tablet      5. Prostate cancer screening  Z12.5 PSA, screen     PSA, screen        -Updated screening, immunizations, prevention.  Please see health maintenance list, care gaps  -essentially pre-diabetic now. W/diet changes A1c sub 6.5% > 1 yr  -CAD stable. Cont meds  -depression ok. Seeing therapist. Will contact me if he wants to attempt a higher dose of bupropion at 300 mg /d    Patient has been advised of split billing requirements and indicates understanding: Yes      COUNSELING:   Reviewed preventive health counseling, as reflected in patient instructions       Regular exercise       Healthy diet/nutrition      BMI:   Estimated body mass index is 32.36 kg/m  as calculated from the following:    Height as of this encounter: 1.702 m (5' 7\").    Weight as of this encounter: 93.7 kg (206 lb 9.6 oz).   Weight management plan: Discussed healthy diet and exercise guidelines      He reports that he has never smoked. He has never used smokeless tobacco.            Tha Ortega MD    [facility]    He is at risk for lack of exercise and has been provided with information to increase physical activity for the benefit of his well-being.  The patient was counseled and encouraged to " consider modifying their diet and eating habits. He was provided with information on recommended healthy diet options.  The patient was provided with suggestions to help him develop a healthy emotional lifestyle.  The patient's PHQ-9 score is consistent with mild depression. He was provided with information regarding depression and was advised to schedule a follow up appointment in 52 weeks to further address this issue.

## 2024-01-29 DIAGNOSIS — I48.91 NEW ONSET ATRIAL FIBRILLATION (H): ICD-10-CM

## 2024-01-29 RX ORDER — PANTOPRAZOLE SODIUM 40 MG/1
40 TABLET, DELAYED RELEASE ORAL DAILY
Qty: 90 TABLET | Refills: 0 | Status: SHIPPED | OUTPATIENT
Start: 2024-01-29 | End: 2024-05-10

## 2024-03-15 ENCOUNTER — ORDERS ONLY (AUTO-RELEASED) (OUTPATIENT)
Dept: INTERNAL MEDICINE | Facility: CLINIC | Age: 64
End: 2024-03-15
Payer: COMMERCIAL

## 2024-03-15 DIAGNOSIS — E11.9 TYPE 2 DIABETES MELLITUS WITHOUT COMPLICATION, WITHOUT LONG-TERM CURRENT USE OF INSULIN (H): ICD-10-CM

## 2024-04-14 LAB — NONINV COLON CA DNA+OCC BLD SCRN STL QL: NEGATIVE

## 2024-04-22 ENCOUNTER — OFFICE VISIT (OUTPATIENT)
Dept: INTERNAL MEDICINE | Facility: CLINIC | Age: 64
End: 2024-04-22
Payer: COMMERCIAL

## 2024-04-22 VITALS
WEIGHT: 202.2 LBS | HEIGHT: 67 IN | TEMPERATURE: 97.4 F | SYSTOLIC BLOOD PRESSURE: 133 MMHG | HEART RATE: 67 BPM | RESPIRATION RATE: 16 BRPM | OXYGEN SATURATION: 96 % | BODY MASS INDEX: 31.74 KG/M2 | DIASTOLIC BLOOD PRESSURE: 74 MMHG

## 2024-04-22 DIAGNOSIS — E11.9 TYPE 2 DIABETES MELLITUS WITHOUT COMPLICATION, WITHOUT LONG-TERM CURRENT USE OF INSULIN (H): ICD-10-CM

## 2024-04-22 DIAGNOSIS — I83.892 VARICOSE VEINS OF LEFT LOWER EXTREMITY WITH EDEMA: Primary | ICD-10-CM

## 2024-04-22 PROCEDURE — 99213 OFFICE O/P EST LOW 20 MIN: CPT | Performed by: INTERNAL MEDICINE

## 2024-04-22 NOTE — PROGRESS NOTES
"  Assessment & Plan     Varicose veins of left lower extremity with edema  Mild- but noted. Use compression stockings  - Compression Sleeve/Stocking Order for DME - ONLY FOR DME    Type 2 diabetes mellitus without complication, without long-term current use of insulin (H)  - Adult Eye  Referral; Future                  Subjective   Hermelindo is a 63 year old, presenting for the following health issues:  Edema    History of Present Illness       Reason for visit:  Fluid retention on top of left foot  Symptom onset:  More than a month  Symptom intensity:  Mild  Symptom progression:  Staying the same  Had these symptoms before:  No    He eats 0-1 servings of fruits and vegetables daily.He consumes 0 sweetened beverage(s) daily.He exercises with enough effort to increase his heart rate 10 to 19 minutes per day.  He exercises with enough effort to increase his heart rate 3 or less days per week.   He is taking medications regularly.                     Objective    /74   Pulse 67   Temp 97.4  F (36.3  C) (Temporal)   Resp 16   Ht 1.702 m (5' 7\")   Wt 91.7 kg (202 lb 3.2 oz)   SpO2 96%   BMI 31.67 kg/m    Body mass index is 31.67 kg/m .  Physical Exam   GENERAL: alert and no distress  MS: LLE circumferential size slightly larger than RLE at calf, ankle and foot. No pitting edema. Very slight forefoot fat pad/edema noted.             Signed Electronically by: Tha Ortega MD    "

## 2024-05-10 DIAGNOSIS — I48.91 NEW ONSET ATRIAL FIBRILLATION (H): ICD-10-CM

## 2024-05-10 RX ORDER — PANTOPRAZOLE SODIUM 40 MG/1
40 TABLET, DELAYED RELEASE ORAL DAILY
Qty: 90 TABLET | Refills: 0 | Status: SHIPPED | OUTPATIENT
Start: 2024-05-10 | End: 2024-06-25

## 2024-05-21 NOTE — TELEPHONE ENCOUNTER
Call pt- 30 d fill approved.  f/u needed before further refills approved.     file   Intimate Partner Violence: Not on file   Housing Stability: Not on file     FH reviewed, mo with CABG.      Vitals:    05/21/24 1449   BP: 120/60   Pulse: 80     Wt 214 ( stable)    Review of Systems   Constitutional:  Positive for fatigue. Negative for activity change.   Respiratory:  Negative for apnea, cough, choking, chest tightness and shortness of breath.    Cardiovascular:  Negative for chest pain, palpitations and leg swelling.        No PND or orthopnea.  No tachycardia.   Gastrointestinal:  Negative for abdominal distention.   Musculoskeletal:  Negative for myalgias.   Neurological:  Negative for dizziness, syncope and light-headedness.   Psychiatric/Behavioral:  Negative for behavioral problems.        Objective:   Physical Exam  Constitutional:       General: He is not in acute distress.     Appearance: Normal appearance. He is well-developed.   HENT:      Head: Normocephalic and atraumatic.      Right Ear: External ear normal.      Left Ear: External ear normal.      Nose: Nose normal.   Neck:      Vascular: No JVD.   Cardiovascular:      Rate and Rhythm: Normal rate and regular rhythm.      Heart sounds: Normal heart sounds.      No gallop.   Pulmonary:      Effort: No respiratory distress.      Breath sounds: Normal breath sounds. No wheezing or rales.   Abdominal:      General: Bowel sounds are normal.      Palpations: Abdomen is soft.      Tenderness: There is no abdominal tenderness.   Musculoskeletal:         General: Normal range of motion.      Cervical back: Normal range of motion and neck supple.   Skin:     General: Skin is warm and dry.   Neurological:      Mental Status: He is alert.         Assessment:       Diagnosis Orders   1. Dilated cardiomyopathy (HCC)        2. Persistent atrial fibrillation (HCC)        3. LBBB (left bundle branch block)        4. Abnormal EKG                  Plan:   Assessment & Plan   CV stable. No sob. No orthopnea.   No chest pain.  Appears compensated.

## 2024-06-25 DIAGNOSIS — I48.91 NEW ONSET ATRIAL FIBRILLATION (H): ICD-10-CM

## 2024-06-25 DIAGNOSIS — E78.00 HYPERCHOLESTEROLEMIA: ICD-10-CM

## 2024-06-25 RX ORDER — ROSUVASTATIN CALCIUM 20 MG/1
20 TABLET, COATED ORAL DAILY
Qty: 90 TABLET | Refills: 3 | Status: SHIPPED | OUTPATIENT
Start: 2024-06-25

## 2024-06-25 RX ORDER — PANTOPRAZOLE SODIUM 40 MG/1
40 TABLET, DELAYED RELEASE ORAL DAILY
Qty: 90 TABLET | Refills: 0 | Status: SHIPPED | OUTPATIENT
Start: 2024-06-25

## 2024-08-11 ENCOUNTER — HEALTH MAINTENANCE LETTER (OUTPATIENT)
Age: 64
End: 2024-08-11

## 2024-09-20 DIAGNOSIS — G47.33 OSA (OBSTRUCTIVE SLEEP APNEA): Primary | ICD-10-CM

## 2024-09-27 DIAGNOSIS — E78.5 HYPERLIPIDEMIA LDL GOAL <70: ICD-10-CM

## 2024-09-27 DIAGNOSIS — I25.10 CORONARY ARTERY DISEASE INVOLVING NATIVE CORONARY ARTERY OF NATIVE HEART WITHOUT ANGINA PECTORIS: ICD-10-CM

## 2024-10-02 RX ORDER — EZETIMIBE 10 MG/1
10 TABLET ORAL DAILY
Qty: 90 TABLET | Refills: 0 | Status: SHIPPED | OUTPATIENT
Start: 2024-10-02

## 2024-10-02 NOTE — TELEPHONE ENCOUNTER
[facility] Refill Guideline reviewed.  Medication meets criteria for refill. SABRINA Kenny RN

## 2024-12-04 DIAGNOSIS — I48.91 NEW ONSET ATRIAL FIBRILLATION (H): ICD-10-CM

## 2024-12-05 RX ORDER — PANTOPRAZOLE SODIUM 40 MG/1
40 TABLET, DELAYED RELEASE ORAL DAILY
Qty: 90 TABLET | Refills: 0 | Status: SHIPPED | OUTPATIENT
Start: 2024-12-05

## 2024-12-30 ENCOUNTER — LAB (OUTPATIENT)
Dept: LAB | Facility: CLINIC | Age: 64
End: 2024-12-30
Payer: COMMERCIAL

## 2024-12-30 DIAGNOSIS — I25.10 CORONARY ARTERY DISEASE INVOLVING NATIVE CORONARY ARTERY WITHOUT ANGINA PECTORIS: Primary | ICD-10-CM

## 2024-12-30 DIAGNOSIS — N18.1 CKD (CHRONIC KIDNEY DISEASE) STAGE 1, GFR 90 ML/MIN OR GREATER: ICD-10-CM

## 2024-12-30 DIAGNOSIS — R79.89 ELEVATED SERUM CREATININE: ICD-10-CM

## 2024-12-30 DIAGNOSIS — E11.00 TYPE 2 DIABETES MELLITUS WITH HYPEROSMOLARITY WITHOUT COMA, WITHOUT LONG-TERM CURRENT USE OF INSULIN (H): ICD-10-CM

## 2024-12-30 DIAGNOSIS — E11.9 DIABETES MELLITUS, TYPE 2 (H): ICD-10-CM

## 2024-12-30 DIAGNOSIS — R63.5 WEIGHT GAIN: ICD-10-CM

## 2024-12-30 LAB
ANION GAP SERPL CALCULATED.3IONS-SCNC: 11 MMOL/L (ref 7–15)
BUN SERPL-MCNC: 16.3 MG/DL (ref 8–23)
CALCIUM SERPL-MCNC: 9 MG/DL (ref 8.8–10.4)
CHLORIDE SERPL-SCNC: 106 MMOL/L (ref 98–107)
CHOLEST SERPL-MCNC: 161 MG/DL
CREAT SERPL-MCNC: 1.16 MG/DL (ref 0.67–1.17)
CREAT UR-MCNC: 165 MG/DL
EGFRCR SERPLBLD CKD-EPI 2021: 69 ML/MIN/1.73M2
EST. AVERAGE GLUCOSE BLD GHB EST-MCNC: 128 MG/DL
FASTING STATUS PATIENT QL REPORTED: YES
FASTING STATUS PATIENT QL REPORTED: YES
GLUCOSE SERPL-MCNC: 123 MG/DL (ref 70–99)
HBA1C MFR BLD: 6.1 % (ref 0–5.6)
HCO3 SERPL-SCNC: 24 MMOL/L (ref 22–29)
HDLC SERPL-MCNC: 43 MG/DL
LDLC SERPL CALC-MCNC: 86 MG/DL
MICROALBUMIN UR-MCNC: 23.8 MG/L
MICROALBUMIN/CREAT UR: 14.42 MG/G CR (ref 0–17)
NONHDLC SERPL-MCNC: 118 MG/DL
POTASSIUM SERPL-SCNC: 4.6 MMOL/L (ref 3.4–5.3)
SODIUM SERPL-SCNC: 141 MMOL/L (ref 135–145)
T4 FREE SERPL-MCNC: 0.99 NG/DL (ref 0.9–1.7)
TRIGL SERPL-MCNC: 162 MG/DL
TSH SERPL DL<=0.005 MIU/L-ACNC: 4.43 UIU/ML (ref 0.3–4.2)

## 2024-12-30 PROCEDURE — 80061 LIPID PANEL: CPT

## 2024-12-30 PROCEDURE — 36415 COLL VENOUS BLD VENIPUNCTURE: CPT

## 2024-12-30 PROCEDURE — 82570 ASSAY OF URINE CREATININE: CPT

## 2024-12-30 PROCEDURE — 83036 HEMOGLOBIN GLYCOSYLATED A1C: CPT

## 2024-12-30 PROCEDURE — 80048 BASIC METABOLIC PNL TOTAL CA: CPT

## 2024-12-30 PROCEDURE — 82043 UR ALBUMIN QUANTITATIVE: CPT

## 2024-12-30 PROCEDURE — 84443 ASSAY THYROID STIM HORMONE: CPT

## 2024-12-30 PROCEDURE — 84439 ASSAY OF FREE THYROXINE: CPT

## 2025-01-08 ENCOUNTER — DOCUMENTATION ONLY (OUTPATIENT)
Dept: INTERNAL MEDICINE | Facility: CLINIC | Age: 65
End: 2025-01-08
Payer: COMMERCIAL

## 2025-01-08 DIAGNOSIS — R79.89 ELEVATED TSH: Primary | ICD-10-CM

## 2025-01-08 NOTE — PROGRESS NOTES
Hermelindo Duffy has an upcoming lab appointment:    Future Appointments   Date Time Provider Department Center   1/15/2025  8:45 AM [facility]    2/10/2025  9:30 AM Tha Ortega MD [facility]   2/12/2025  1:45 PM [id number]     Patient is scheduled for the following lab(s): [facility] labs    There is no order available. Please review and place either future orders or HMPO (Review of Health Maintenance Protocol Orders), as appropriate.    Health Maintenance Due   Topic    ANNUAL REVIEW OF HM ORDERS      Keshia Hernandez

## 2025-02-12 ENCOUNTER — HOSPITAL ENCOUNTER (OUTPATIENT)
Dept: CARDIOLOGY | Facility: CLINIC | Age: 65
Discharge: HOME | End: 2025-02-12
Attending: NURSE PRACTITIONER
Payer: COMMERCIAL

## 2025-02-12 DIAGNOSIS — I25.10 CORONARY ARTERY DISEASE INVOLVING NATIVE CORONARY ARTERY OF NATIVE HEART WITHOUT ANGINA PECTORIS: ICD-10-CM

## 2025-02-12 DIAGNOSIS — R63.5 WEIGHT GAIN: ICD-10-CM

## 2025-02-12 LAB — LVEF ECHO: NORMAL

## 2025-02-12 PROCEDURE — 93306 TTE W/DOPPLER COMPLETE: CPT | Mod: 26 | Performed by: INTERNAL MEDICINE

## 2025-02-12 PROCEDURE — 93306 TTE W/DOPPLER COMPLETE: CPT

## 2025-02-28 ENCOUNTER — MYC MEDICAL ADVICE (OUTPATIENT)
Dept: CARDIOLOGY | Facility: CLINIC | Age: 65
End: 2025-02-28
Payer: COMMERCIAL

## 2025-03-06 ENCOUNTER — TELEPHONE (OUTPATIENT)
Dept: CARDIOLOGY | Facility: CLINIC | Age: 65
End: 2025-03-06

## 2025-03-06 ENCOUNTER — OFFICE VISIT (OUTPATIENT)
Dept: INTERNAL MEDICINE | Facility: CLINIC | Age: 65
End: 2025-03-06
Attending: INTERNAL MEDICINE
Payer: COMMERCIAL

## 2025-03-06 VITALS
TEMPERATURE: 97.3 F | DIASTOLIC BLOOD PRESSURE: 72 MMHG | RESPIRATION RATE: 16 BRPM | WEIGHT: 208.6 LBS | SYSTOLIC BLOOD PRESSURE: 116 MMHG | HEIGHT: 67 IN | HEART RATE: 70 BPM | OXYGEN SATURATION: 99 % | BODY MASS INDEX: 32.74 KG/M2

## 2025-03-06 DIAGNOSIS — Z86.79 HISTORY OF ATRIAL FIBRILLATION WITHOUT CURRENT MEDICATION: ICD-10-CM

## 2025-03-06 DIAGNOSIS — Z87.19 HX OF DUODENAL ULCER: ICD-10-CM

## 2025-03-06 DIAGNOSIS — E78.5 HYPERLIPIDEMIA LDL GOAL <70: ICD-10-CM

## 2025-03-06 DIAGNOSIS — H61.22 IMPACTED CERUMEN OF LEFT EAR: ICD-10-CM

## 2025-03-06 DIAGNOSIS — E11.9 TYPE 2 DIABETES MELLITUS WITHOUT COMPLICATION, WITHOUT LONG-TERM CURRENT USE OF INSULIN (H): ICD-10-CM

## 2025-03-06 DIAGNOSIS — L20.89 OTHER ATOPIC DERMATITIS: ICD-10-CM

## 2025-03-06 DIAGNOSIS — R79.89 ELEVATED TSH: ICD-10-CM

## 2025-03-06 DIAGNOSIS — F33.0 MAJOR DEPRESSIVE DISORDER, RECURRENT EPISODE, MILD: ICD-10-CM

## 2025-03-06 DIAGNOSIS — I25.10 CORONARY ARTERY DISEASE INVOLVING NATIVE CORONARY ARTERY OF NATIVE HEART WITHOUT ANGINA PECTORIS: ICD-10-CM

## 2025-03-06 DIAGNOSIS — Z00.00 ROUTINE GENERAL MEDICAL EXAMINATION AT A HEALTH CARE FACILITY: Primary | ICD-10-CM

## 2025-03-06 PROBLEM — I48.91 NEW ONSET ATRIAL FIBRILLATION (H): Status: RESOLVED | Noted: 2025-03-06 | Resolved: 2025-03-06

## 2025-03-06 PROBLEM — I48.91 NEW ONSET ATRIAL FIBRILLATION (H): Status: ACTIVE | Noted: 2025-03-06

## 2025-03-06 LAB — TSH SERPL DL<=0.005 MIU/L-ACNC: 3.67 UIU/ML (ref 0.3–4.2)

## 2025-03-06 RX ORDER — BUPROPION HYDROCHLORIDE 150 MG/1
TABLET ORAL
Qty: 90 TABLET | Refills: 3 | Status: SHIPPED | OUTPATIENT
Start: 2025-03-06

## 2025-03-06 RX ORDER — CITALOPRAM HYDROBROMIDE 20 MG/1
20 TABLET ORAL DAILY
Qty: 90 TABLET | Refills: 3 | Status: SHIPPED | OUTPATIENT
Start: 2025-03-06

## 2025-03-06 RX ORDER — EZETIMIBE 10 MG/1
10 TABLET ORAL DAILY
Qty: 90 TABLET | Refills: 3 | Status: SHIPPED | OUTPATIENT
Start: 2025-03-06

## 2025-03-06 RX ORDER — ROSUVASTATIN CALCIUM 40 MG/1
40 TABLET, COATED ORAL AT BEDTIME
Qty: 90 TABLET | Refills: 3 | Status: SHIPPED | OUTPATIENT
Start: 2025-03-06

## 2025-03-06 SDOH — HEALTH STABILITY: PHYSICAL HEALTH: ON AVERAGE, HOW MANY DAYS PER WEEK DO YOU ENGAGE IN MODERATE TO STRENUOUS EXERCISE (LIKE A BRISK WALK)?: 0 DAYS

## 2025-03-06 SDOH — HEALTH STABILITY: PHYSICAL HEALTH: ON AVERAGE, HOW MANY MINUTES DO YOU ENGAGE IN EXERCISE AT THIS LEVEL?: 10 MIN

## 2025-03-06 ASSESSMENT — PATIENT HEALTH QUESTIONNAIRE - PHQ9
SUM OF ALL RESPONSES TO PHQ QUESTIONS 1-9: 6
10. IF YOU CHECKED OFF ANY PROBLEMS, HOW DIFFICULT HAVE THESE PROBLEMS MADE IT FOR YOU TO DO YOUR WORK, TAKE CARE OF THINGS AT HOME, OR GET ALONG WITH OTHER PEOPLE: NOT DIFFICULT AT ALL
SUM OF ALL RESPONSES TO PHQ QUESTIONS 1-9: 6

## 2025-03-06 ASSESSMENT — SOCIAL DETERMINANTS OF HEALTH (SDOH): HOW OFTEN DO YOU GET TOGETHER WITH FRIENDS OR RELATIVES?: TWICE A WEEK

## 2025-03-06 NOTE — PATIENT INSTRUCTIONS
Patient Education   Preventive Care Advice   This is general advice given by our system to help you stay healthy. However, your care team may have specific advice just for you. Please talk to your care team about your preventive care needs.  Nutrition  Eat 5 or more servings of fruits and vegetables each day.  Try wheat bread, brown rice and whole grain pasta (instead of white bread, rice, and pasta).  Get enough calcium and vitamin D. Check the label on foods and aim for 100% of the RDA (recommended daily allowance).  Lifestyle  Exercise at least 150 minutes each week  (30 minutes a day, 5 days a week).  Do muscle strengthening activities 2 days a week. These help control your weight and prevent disease.  No smoking.  Wear sunscreen to prevent skin cancer.  Have a dental exam and cleaning every 6 months.  Yearly exams  See your health care team every year to talk about:  Any changes in your health.  Any medicines your care team has prescribed.  Preventive care, family planning, and ways to prevent chronic diseases.  Shots (vaccines)   HPV shots (up to age 26), if you've never had them before.  Hepatitis B shots (up to age 59), if you've never had them before.  COVID-19 shot: Get this shot when it's due.  Flu shot: Get a flu shot every year.  Tetanus shot: Get a tetanus shot every 10 years.  Pneumococcal, hepatitis A, and RSV shots: Ask your care team if you need these based on your risk.  Shingles shot (for age 50 and up)  General health tests  Diabetes screening:  Starting at age 35, Get screened for diabetes at least every 3 years.  If you are younger than age 35, ask your care team if you should be screened for diabetes.  Cholesterol test: At age 39, start having a cholesterol test every 5 years, or more often if advised.  Bone density scan (DEXA): At age 50, ask your care team if you should have this scan for osteoporosis (brittle bones).  Hepatitis C: Get tested at least once in your life.  STIs (sexually  transmitted infections)  Before age 24: Ask your care team if you should be screened for STIs.  After age 24: Get screened for STIs if you're at risk. You are at risk for STIs (including HIV) if:  You are sexually active with more than one person.  You don't use condoms every time.  You or a partner was diagnosed with a sexually transmitted infection.  If you are at risk for HIV, ask about PrEP medicine to prevent HIV.  Get tested for HIV at least once in your life, whether you are at risk for HIV or not.  Cancer screening tests  Cervical cancer screening: If you have a cervix, begin getting regular cervical cancer screening tests starting at age 21.  Breast cancer scan (mammogram): If you've ever had breasts, begin having regular mammograms starting at age 40. This is a scan to check for breast cancer.  Colon cancer screening: It is important to start screening for colon cancer at age 45.  Have a colonoscopy test every 10 years (or more often if you're at risk) Or, ask your provider about stool tests like a FIT test every year or Cologuard test every 3 years.  To learn more about your testing options, visit:   .  For help making a decision, visit:   https://bit.ly/dw79905.  Prostate cancer screening test: If you have a prostate, ask your care team if a prostate cancer screening test (PSA) at age 55 is right for you.  Lung cancer screening: If you are a current or former smoker ages 50 to 80, ask your care team if ongoing lung cancer screenings are right for you.  For informational purposes only. Not to replace the advice of your health care provider. Copyright   2023 [facility]. All rights reserved. Clinically reviewed by the [facility]. [employer] [id number] - REV 01/24.  Hearing Loss: Care Instructions  Overview     Hearing loss is a sudden or slow decrease in how well you hear. It can range from slight to profound. Permanent hearing loss can occur with aging. It also can  happen when you are exposed long-term to loud noise. Examples include listening to loud music, riding motorcycles, or being around other loud machines.  Hearing loss can affect your work and home life. It can make you feel lonely or depressed. You may feel that you have lost your independence. But hearing aids and other devices can help you hear better and feel connected to others.  Follow-up care is a key part of your treatment and safety. Be sure to make and go to all appointments, and call your doctor if you are having problems. It's also a good idea to know your test results and keep a list of the medicines you take.  How can you care for yourself at home?  Avoid loud noises whenever possible. This helps keep your hearing from getting worse.  Always wear hearing protection around loud noises.  Wear a hearing aid as directed.  A professional can help you pick a hearing aid that will work best for you.  You can also get hearing aids over the counter for mild to moderate hearing loss.  Have hearing tests as your doctor suggests. They can show whether your hearing has changed. Your hearing aid may need to be adjusted.  Use other devices as needed. These may include:  Telephone amplifiers and hearing aids that can connect to a television, stereo, radio, or microphone.  Devices that use lights or vibrations. These alert you to the doorbell, a ringing telephone, or a baby monitor.  Television closed-captioning. This shows the words at the bottom of the screen. Most new TVs can do this.  TTY (text telephone). This lets you type messages back and forth on the telephone instead of talking or listening. These devices are also called TDD. When messages are typed on the keyboard, they are sent over the phone line to a receiving TTY. The message is shown on a monitor.  Use text messaging, social media, and email if it is hard for you to communicate by telephone.  Try to learn a listening technique called speechreading. It is  "not lipreading. You pay attention to people's gestures, expressions, posture, and tone of voice. These clues can help you understand what a person is saying. Face the person you are talking to, and have them face you. Make sure the lighting is good. You need to see the other person's face clearly.  Think about counseling if you need help to adjust to your hearing loss.  When should you call for help?  Watch closely for changes in your health, and be sure to contact your doctor if:    You think your hearing is getting worse.     You have new symptoms, such as dizziness or nausea.   Where can you learn more?  Go to https://www.[employer].net/patiented  Enter R798 in the search box to learn more about \"Hearing Loss: Care Instructions.\"  Current as of: September 27, 2023  Content Version: 14.3    2024 [employer].   Care instructions adapted under license by your healthcare professional. If you have questions about a medical condition or this instruction, always ask your healthcare professional. [employer] disclaims any warranty or liability for your use of this information.    Learning About Stress  What is stress?     Stress is your body's response to a hard situation. Your body can have a physical, emotional, or mental response. Stress is a fact of life for most people, and it affects everyone differently. What causes stress for you may not be stressful for someone else.  A lot of things can cause stress. You may feel stress when you go on a job interview, take a test, or run a race. This kind of short-term stress is normal and even useful. It can help you if you need to work hard or react quickly. For example, stress can help you finish an important job on time.  Long-term stress is caused by ongoing stressful situations or events. Examples of long-term stress include long-term health problems, ongoing problems at work, or conflicts in your family. Long-term stress can harm your health.  How " does stress affect your health?  When you are stressed, your body responds as though you are in danger. It makes hormones that speed up your heart, make you breathe faster, and give you a burst of energy. This is called the fight-or-flight stress response. If the stress is over quickly, your body goes back to normal and no harm is done.  But if stress happens too often or lasts too long, it can have bad effects. Long-term stress can make you more likely to get sick, and it can make symptoms of some diseases worse. If you tense up when you are stressed, you may develop neck, shoulder, or low back pain. Stress is linked to high blood pressure and heart disease.  Stress also harms your emotional health. It can make you asif, tense, or depressed. Your relationships may suffer, and you may not do well at work or school.  What can you do to manage stress?  You can try these things to help manage stress:   Do something active. Exercise or activity can help reduce stress. Walking is a great way to get started. Even everyday activities such as housecleaning or yard work can help.  Try yoga or petra chi. These techniques combine exercise and meditation. You may need some training at first to learn them.  Do something you enjoy. For example, listen to music or go to a movie. Practice your hobby or do volunteer work.  Meditate. This can help you relax, because you are not worrying about what happened before or what may happen in the future.  Do guided imagery. Imagine yourself in any setting that helps you feel calm. You can use online videos, books, or a teacher to guide you.  Do breathing exercises. For example:  From a standing position, bend forward from the waist with your knees slightly bent. Let your arms dangle close to the floor.  Breathe in slowly and deeply as you return to a standing position. Roll up slowly and lift your head last.  Hold your breath for just a few seconds in the standing position.  Breathe out  "slowly and bend forward from the waist.  Let your feelings out. Talk, laugh, cry, and express anger when you need to. Talking with supportive friends or family, a counselor, or a roxi leader about your feelings is a healthy way to relieve stress. Avoid discussing your feelings with people who make you feel worse.  Write. It may help to write about things that are bothering you. This helps you find out how much stress you feel and what is causing it. When you know this, you can find better ways to cope.  What can you do to prevent stress?  You might try some of these things to help prevent stress:  Manage your time. This helps you find time to do the things you want and need to do.  Get enough sleep. Your body recovers from the stresses of the day while you are sleeping.  Get support. Your family, friends, and community can make a difference in how you experience stress.  Limit your news feed. Avoid or limit time on social media or news that may make you feel stressed.  Do something active. Exercise or activity can help reduce stress. Walking is a great way to get started.  Where can you learn more?  Go to https://www.[employer].net/patiented  Enter N032 in the search box to learn more about \"Learning About Stress.\"  Current as of: October 24, 2023  Content Version: 14.3    2024 [employer].   Care instructions adapted under license by your healthcare professional. If you have questions about a medical condition or this instruction, always ask your healthcare professional. [employer] disclaims any warranty or liability for your use of this information.    Learning About Depression Screening  What is depression screening?  Depression screening is a way to see if you have depression symptoms. It may be done by a doctor or counselor. It's often part of a routine checkup. That's because your mental health is just as important as your physical health.  Depression is a mental health condition that " "affects how you feel, think, and act. You may:  Have less energy.  Lose interest in your daily activities.  Feel sad and grouchy for a long time.  Depression is very common. It affects people of all ages.  Many things can lead to depression. Some people become depressed after they have a stroke or find out they have a major illness like cancer or heart disease. The death of a loved one or a breakup may lead to depression. It can run in families. Most experts believe that a combination of inherited genes and stressful life events can cause it.  What happens during screening?  You may be asked to fill out a form about your depression symptoms. You and the doctor will discuss your answers. The doctor may ask you more questions to learn more about how you think, act, and feel.  What happens after screening?  If you have symptoms of depression, your doctor will talk to you about your options.  Doctors usually treat depression with medicines or counseling. Often, combining the two works best. Many people don't get help because they think that they'll get over the depression on their own. But people with depression may not get better unless they get treatment.  The cause of depression is not well understood. There may be many factors involved. But if you have depression, it's not your fault.  A serious symptom of depression is thinking about death or suicide. If you or someone you care about talks about this or about feeling hopeless, get help right away.  It's important to know that depression can be treated. Medicine, counseling, and self-care may help.  Where can you learn more?  Go to https://www.[employer].net/patiented  Enter T185 in the search box to learn more about \"Learning About Depression Screening.\"  Current as of: July 31, 2024  Content Version: 14.3    2024 [employer]   Care instructions adapted under license by your healthcare professional. If you have questions about a medical condition or " "this instruction, always ask your healthcare professional. [employer] disclaims any warranty or liability for your use of this information.    9 Ways to Cut Back on Drinking  Maybe you've found yourself drinking more alcohol than you'd prefer. If you want to cut back, here are some ideas to try.    Think before you drink.  Do you really want a drink, or is it just a habit? If you're used to having a drink at a certain time, try doing something else then.     Look for substitutes.  Find some no-alcohol drinks that you enjoy, like flavored seltzer water, tea with honey, or tonic with a slice of lime. Or try alcohol-free beer or \"virgin\" cocktails (without the alcohol).     Drink more water.  Use water to quench your thirst. Drink a glass of water before you have any alcohol. Have another glass along with every drink or between drinks.     Shrink your drink.  For example, have a bottle of beer instead of a pint. Use a smaller glass for wine. Choose drinks with lower alcohol content (ABV%). Or use less liquor and more mixer in cocktails.     Slow down.  It's easy to drink quickly and without thinking about it. Pay attention, and make each drink last longer.     Do the math.  Total up how much you spend on alcohol each month. How much is that a year? If you cut back, what could you do with the money you save?     Take a break.  Choose a day or two each week when you won't drink at all. Notice how you feel on those days, physically and emotionally. How did you sleep? Do you feel better? Over time, add more break days.     Count calories.  Would you like to lose some weight? For some people that's a good motivator for cutting back. Figure out how many calories are in each drink. How many does that add up to in a day? In a week? In a month?     Practice saying no.  Be ready when someone offers you a drink. Try: \"Thanks, I've had enough.\" Or \"Thanks, but I'm cutting back.\" Or \"No, thanks. I feel better when I " "drink less.\"   Current as of: November 15, 2023  Content Version: 14.3    2024 [employer].   Care instructions adapted under license by your healthcare professional. If you have questions about a medical condition or this instruction, always ask your healthcare professional. [employer] disclaims any warranty or liability for your use of this information.     "

## 2025-03-06 NOTE — TELEPHONE ENCOUNTER
[facility]  Fax:[phone]  Spec: [phone]  [place]: [phone]

## 2025-03-06 NOTE — TELEPHONE ENCOUNTER
Central Prior Authorization Team   Phone: [phone]    PA Initiation    Medication: Pantoprazole Sodium 40mg Tbec  Insurance Company: [employer] PA's - Phone [phone] Fax [phone]  Pharmacy Filling the Rx: [facility] - [place], [place] - [address]  Filling Pharmacy Phone: [phone]  Filling Pharmacy Fax:    Start Date: 3/6/2025

## 2025-03-06 NOTE — PROGRESS NOTES
"Preventive Care Visit  [facility]  Tha Ortega MD, Internal Medicine  Mar 6, 2025      Assessment & Plan     Routine general medical examination at a health care facility  Updated screening, immunizations, prevention.  Please see health maintenance list, care gaps     Coronary artery disease involving native coronary artery of native heart without angina pectoris  Hyperlipidemia LDL goal <70  LDL still above goal  (86) with rosuvastatin + ezetimibe.   Increase dose to 40 mg - recheck labs in 8 weeks depending on results could look at using a PSK9i if not at goal  - Lipid panel reflex to direct LDL Fasting; Future    Type 2 diabetes mellitus without complication, without long-term current use of insulin (H)  A1c sub diabetic range. Diet only.  Given obesity another consideration could be to look at using semaglutide     History of atrial fibrillation without current medication  One isolated symptomatic episode in 2023. Not on anticoag per CV recommendations.  No recurrence of sx.     Elevated TSH  Recheck   - TSH with free T4 reflex    Hx duodenal ulcer w/bleeding - 1980s  Chronic ASA use  -PPI rec'd daily along with asa  Insurance no longer covers- look at any otc PPI avail. , or private pay.     Cerumen impaction  Manual disimpaction with curette performed by MD followed by irrigation for complete removal of any excess cerumen by MA       Patient has been advised of split billing requirements and indicates understanding: Yes        BMI  Estimated body mass index is 32.67 kg/m  as calculated from the following:    Height as of this encounter: 1.702 m (5' 7\").    Weight as of this encounter: 94.6 kg (208 lb 9.6 oz).   Weight management plan: Discussed healthy diet and exercise guidelines    Counseling  Appropriate preventive services were addressed with this patient via screening, questionnaire, or discussion as appropriate for fall prevention, nutrition, physical activity, Tobacco-use " cessation, social engagement, weight loss and cognition.  Checklist reviewing preventive services available has been given to the patient.  Reviewed patient's diet, addressing concerns and/or questions.   The patient was instructed to see the dentist every 6 months.   He is at risk for psychosocial distress and has been provided with information to reduce risk.   The patient reports drinking more than 3 alcoholic drinks per day and/or more than 7 drhnks per week. The patient was counseled and given information about possible harmful effects of excessive alcohol intake.The patient was provided with written information regarding signs of hearing loss.   The patient's PHQ-9 score is consistent with mild depression. He was provided with information regarding depression.       Work on weight loss  Regular exercise  See Patient Instructions    Cari Casarez is a 64 year old, presenting for the following:  Physical       Via the Health Maintenance questionnaire, the patient has reported the following services have been completed -Eye Exam: target 2025-06-01, this information has been sent to the abstraction team.    HPI            Advance Care Planning  Patient does not have a Health Care Directive: Discussed advance care planning with patient; however, patient declined at this time.      3/6/2025   General Health   How would you rate your overall physical health? Good   Feel stress (tense, anxious, or unable to sleep) To some extent   (!) STRESS CONCERN      3/6/2025   Nutrition   Diet: Regular (no restrictions)         3/6/2025   Exercise   Days per week of moderate/strenous exercise 0 days   Average minutes spent exercising at this level 10 min   (!) EXERCISE CONCERN      3/6/2025   Social Factors   Frequency of gathering with friends or relatives Twice a week   Worry food won't last until get money to buy more No   Food not last or not have enough money for food? No   Do you have housing? (Housing is defined as  stable permanent housing and does not include staying ouside in a car, in a tent, in an abandoned building, in an overnight shelter, or couch-surfing.) Yes   Are you worried about losing your housing? No   Lack of transportation? No   Unable to get utilities (heat,electricity)? No         3/6/2025   Fall Risk   Fallen 2 or more times in the past year? No   Trouble with walking or balance? No          3/6/2025   Activities of Daily Living- Home Safety   Needs help with the following daily activites None of the above   Safety concerns in the home None of the above         3/6/2025   Dental   Dentist two times every year? (!) NO         3/6/2025   Hearing Screening   Hearing concerns? (!) IT'S HARD TO FOLLOW A CONVERSATION IN A NOISY RESTAURANT OR CROWDED ROOM.         3/6/2025   Driving Risk Screening   Patient/family members have concerns about driving No         3/6/2025   General Alertness/Fatigue Screening   Have you been more tired than usual lately? No         3/6/2025   Urinary Incontinence Screening   Bothered by leaking urine in past 6 months No          Today's PHQ-9 Score:       3/6/2025    12:35 PM   PHQ-9 SCORE   PHQ-9 Total Score MyChart 6 (Mild depression)   PHQ-9 Total Score 6        Patient-reported         3/6/2025   Substance Use   Alcohol more than 3/day or more than 7/wk Yes   How often do you have a drink containing alcohol 4 or more times a week   How many alcohol drinks on typical day 1 or 2   How often do you have 5+ drinks at one occasion Monthly   Audit 2/3 Score 2   How often not able to stop drinking once started Never   How often failed to do what normally expected Never   How often needed first drink in am after a heavy drinking session Never   How often feeling of guilt or remorse after drinking Never   How often unable to remember what happened the night before Never   Have you or someone else been injured because of your drinking No   Has anyone been concerned or suggested you cut  down on drinking No   TOTAL SCORE - AUDIT 6   Do you have a current opioid prescription? No   How severe/bad is pain from 1 to 10? 2/10   Do you use any other substances recreationally? No     Social History     Tobacco Use    Smoking status: Never    Smokeless tobacco: Never   Vaping Use    Vaping status: Never Used   Substance Use Topics    Alcohol use: Yes     Alcohol/week: 7.0 standard drinks of alcohol     Types: 7 Glasses of wine per week     Comment: 1-2 glasses wine/ day    Drug use: No           3/6/2025   AAA Screening   Family history of Abdominal Aortic Aneurysm (AAA)? No   Last PSA:   Prostate Specific Antigen Screen   Date Value Ref Range Status   01/15/2024 0.65 0.00 - 4.50 ng/mL Final   08/08/2022 0.74 0.00 - 4.00 ug/L Final     ASCVD Risk   The ASCVD Risk score (Juaquin ROOT, et al., 2019) failed to calculate for the following reasons:    Risk score cannot be calculated because patient has a medical history suggesting prior/existing ASCVD            Reviewed and updated as needed this visit by Provider   Tobacco      Surg Hx  Fam Hx            Lab work is in process  Labs reviewed in EPIC  Current providers sharing in care for this patient include:  Patient Care Team:  Tha Ortega MD as PCP - General (Internal Medicine)  Tha Ortega MD as Assigned PCP  Anat Yarbrough as Diabetes Educator  Estephanie Langston APRN CNP as Nurse Practitioner (Cardiovascular Disease)  Walter Barnhart RPH as Pharmacist (Pharmacist)  Estephanie Langston APRN CNP as Assigned Heart and Vascular Provider    The following health maintenance items are reviewed in Epic and correct as of today:  Health Maintenance   Topic Date Due    DIABETIC FOOT EXAM  Never done    DEPRESSION ACTION PLAN  Never done    EYE EXAM  Never done    INFLUENZA VACCINE (1) 09/01/2024    A1C  06/30/2025    PHQ-9  09/06/2025    COVID-19 Vaccine (7 - 2024-25 season) 09/06/2025    BMP  12/30/2025    LIPID  12/30/2025     "MICROALBUMIN  12/30/2025    PSA  01/15/2026    MEDICARE ANNUAL WELLNESS VISIT  03/06/2026    ANNUAL REVIEW OF HM ORDERS  03/06/2026    FALL RISK ASSESSMENT  03/06/2026    DTAP/TDAP/TD IMMUNIZATION (4 - Td or Tdap) 03/13/2027    COLORECTAL CANCER SCREENING  04/05/2027    ADVANCE CARE PLANNING  03/06/2030    HEPATITIS C SCREENING  Completed    Pneumococcal Vaccine: 50+ Years  Completed    ZOSTER IMMUNIZATION  Completed    RSV VACCINE  Completed    HPV IMMUNIZATION  Aged Out    MENINGITIS IMMUNIZATION  Aged Out         Review of Systems  Constitutional, HEENT, cardiovascular, pulmonary, gi and gu systems are negative, except as otherwise noted.     Objective    Exam  /72   Pulse 70   Temp 97.3  F (36.3  C) (Temporal)   Resp 16   Ht 1.702 m (5' 7\")   Wt 94.6 kg (208 lb 9.6 oz)   SpO2 99%   BMI 32.67 kg/m     Estimated body mass index is 32.67 kg/m  as calculated from the following:    Height as of this encounter: 1.702 m (5' 7\").    Weight as of this encounter: 94.6 kg (208 lb 9.6 oz).    Physical Exam  GENERAL: alert, no distress, and obese  EYES: Eyes grossly normal to inspection, PERRL and conjunctivae and sclerae normal  HENT: normal cephalic/atraumatic, right ear: normal: no effusions, no erythema, normal landmarks, left ear: occluded with wax, nose and mouth without ulcers or lesions, oropharynx clear, and oral mucous membranes moist  NECK: no adenopathy, no asymmetry, masses, or scars  RESP: lungs clear to auscultation - no rales, rhonchi or wheezes  CV: regular rate and rhythm, normal S1 S2, no S3 or S4, no murmur, click or rub, no peripheral edema  ABDOMEN: soft, nontender, no hepatosplenomegaly, no masses and bowel sounds normal  MS: no gross musculoskeletal defects noted, no edema  SKIN: no suspicious lesions or rashes  NEURO: Normal strength and tone, mentation intact and speech normal  PSYCH: mentation appears normal, affect normal/bright        3/6/2025   Mini Cog   Clock Draw Score 2 Normal "   3 Item Recall 3 objects recalled   Mini Cog Total Score 5             Signed Electronically by: Tha Ortega MD

## 2025-03-06 NOTE — TELEPHONE ENCOUNTER
PRIOR AUTHORIZATION DENIED    Medication: Pantoprazole Sodium 40mg Tbec    Denial Date: 3/6/2025    Denial Rational: All PPI's are excluded from patient's plan benefit.  Patient may still get medication but will be responsible for the cost.         Appeal Information: No appeal can be completed, this drug class is a complete exclusion from plan.

## 2025-03-06 NOTE — TELEPHONE ENCOUNTER
Called Pt left voice mail medication Protonix denied by insurance. Asked he pull up good RX to purchase medication. Left phone number for questions. [phone].SABRINA Kenny RN

## 2025-04-14 ENCOUNTER — MEDICAL CORRESPONDENCE (OUTPATIENT)
Dept: HEALTH INFORMATION MANAGEMENT | Facility: CLINIC | Age: 65
End: 2025-04-14
Payer: COMMERCIAL

## 2025-04-28 ENCOUNTER — PATIENT OUTREACH (OUTPATIENT)
Dept: CARE COORDINATION | Facility: CLINIC | Age: 65
End: 2025-04-28
Payer: COMMERCIAL

## 2025-04-30 ENCOUNTER — PATIENT OUTREACH (OUTPATIENT)
Dept: CARE COORDINATION | Facility: CLINIC | Age: 65
End: 2025-04-30
Payer: COMMERCIAL

## 2025-07-06 ENCOUNTER — HEALTH MAINTENANCE LETTER (OUTPATIENT)
Age: 65
End: 2025-07-06

## (undated) DEVICE — NDL 22GA 1.5"

## (undated) DEVICE — LINEN TOWEL PACK X10 5473

## (undated) DEVICE — BLADE CLIPPER 3M 9670

## (undated) DEVICE — PREP CHLORAPREP 26ML TINTED HI-LITE ORANGE 930815

## (undated) DEVICE — LINEN FULL SHEET 5511

## (undated) DEVICE — SU VICRYL 0 CT-2 CR 8X18" J727D

## (undated) DEVICE — DRAPE LAP W/ARMBOARD 29410

## (undated) DEVICE — ESU GROUND PAD ADULT W/CORD E7507

## (undated) DEVICE — SU VICRYL 4-0 PS-2 18" UND J496H

## (undated) DEVICE — PACK MINOR CUSTOM RIDGES SBA32RMRMA

## (undated) DEVICE — CLEANSER JET LAVAGE IRRISEPT 0.05% CHG IRRISEPT45USA

## (undated) DEVICE — SU VICRYL 3-0 SH 27" UND J416H

## (undated) DEVICE — GLOVE PROTEXIS W/NEU-THERA 6.5  2D73TE65

## (undated) DEVICE — LINEN HALF SHEET 5512

## (undated) DEVICE — ADH SKIN CLOSURE PREMIERPRO EXOFIN MICOR HV 0.5ML 3471

## (undated) DEVICE — BAG CLEAR TRASH 1.3M 39X33" P4040C

## (undated) DEVICE — GLOVE PROTEXIS BLUE W/NEU-THERA 6.5  2D73EB65

## (undated) RX ORDER — EPHEDRINE SULFATE 50 MG/ML
INJECTION, SOLUTION INTRAMUSCULAR; INTRAVENOUS; SUBCUTANEOUS
Status: DISPENSED
Start: 2022-09-15

## (undated) RX ORDER — DEXMEDETOMIDINE HYDROCHLORIDE 4 UG/ML
INJECTION, SOLUTION INTRAVENOUS
Status: DISPENSED
Start: 2022-09-15

## (undated) RX ORDER — KETOROLAC TROMETHAMINE 30 MG/ML
INJECTION, SOLUTION INTRAMUSCULAR; INTRAVENOUS
Status: DISPENSED
Start: 2022-09-15

## (undated) RX ORDER — GLYCOPYRROLATE 0.2 MG/ML
INJECTION INTRAMUSCULAR; INTRAVENOUS
Status: DISPENSED
Start: 2022-09-15

## (undated) RX ORDER — FENTANYL CITRATE-0.9 % NACL/PF 10 MCG/ML
PLASTIC BAG, INJECTION (ML) INTRAVENOUS
Status: DISPENSED
Start: 2022-09-15

## (undated) RX ORDER — NEOSTIGMINE METHYLSULFATE 1 MG/ML
VIAL (ML) INJECTION
Status: DISPENSED
Start: 2022-09-15

## (undated) RX ORDER — LIDOCAINE HYDROCHLORIDE 10 MG/ML
INJECTION, SOLUTION EPIDURAL; INFILTRATION; INTRACAUDAL; PERINEURAL
Status: DISPENSED
Start: 2022-09-15

## (undated) RX ORDER — BUPIVACAINE HYDROCHLORIDE 5 MG/ML
INJECTION, SOLUTION EPIDURAL; INTRACAUDAL
Status: DISPENSED
Start: 2022-09-15

## (undated) RX ORDER — FENTANYL CITRATE 50 UG/ML
INJECTION, SOLUTION INTRAMUSCULAR; INTRAVENOUS
Status: DISPENSED
Start: 2022-09-15

## (undated) RX ORDER — DEXAMETHASONE SODIUM PHOSPHATE 4 MG/ML
INJECTION, SOLUTION INTRA-ARTICULAR; INTRALESIONAL; INTRAMUSCULAR; INTRAVENOUS; SOFT TISSUE
Status: DISPENSED
Start: 2022-09-15

## (undated) RX ORDER — BUPIVACAINE HYDROCHLORIDE AND EPINEPHRINE 5; 5 MG/ML; UG/ML
INJECTION, SOLUTION EPIDURAL; INTRACAUDAL; PERINEURAL
Status: DISPENSED
Start: 2022-09-15

## (undated) RX ORDER — ACETAMINOPHEN 325 MG/1
TABLET ORAL
Status: DISPENSED
Start: 2022-09-15

## (undated) RX ORDER — METHADONE HYDROCHLORIDE 10 MG/ML
INJECTION, SOLUTION INTRAMUSCULAR; INTRAVENOUS; SUBCUTANEOUS
Status: DISPENSED
Start: 2022-09-15

## (undated) RX ORDER — PROPOFOL 10 MG/ML
INJECTION, EMULSION INTRAVENOUS
Status: DISPENSED
Start: 2022-09-15

## (undated) RX ORDER — ONDANSETRON 2 MG/ML
INJECTION INTRAMUSCULAR; INTRAVENOUS
Status: DISPENSED
Start: 2022-09-15

## (undated) RX ORDER — CEFAZOLIN SODIUM/WATER 2 G/20 ML
SYRINGE (ML) INTRAVENOUS
Status: DISPENSED
Start: 2022-09-15